# Patient Record
Sex: FEMALE | Race: BLACK OR AFRICAN AMERICAN | NOT HISPANIC OR LATINO | Employment: OTHER | URBAN - METROPOLITAN AREA
[De-identification: names, ages, dates, MRNs, and addresses within clinical notes are randomized per-mention and may not be internally consistent; named-entity substitution may affect disease eponyms.]

---

## 2017-02-20 RX ORDER — INSULIN GLARGINE 100 [IU]/ML
30 INJECTION, SOLUTION SUBCUTANEOUS DAILY
Qty: 15 ML | Refills: 11 | Status: SHIPPED | OUTPATIENT
Start: 2017-02-20 | End: 2017-02-27

## 2017-02-27 ENCOUNTER — TELEPHONE (OUTPATIENT)
Dept: FAMILY MEDICINE | Facility: CLINIC | Age: 77
End: 2017-02-27

## 2017-02-27 ENCOUNTER — DOCUMENTATION ONLY (OUTPATIENT)
Dept: FAMILY MEDICINE | Facility: CLINIC | Age: 77
End: 2017-02-27

## 2017-02-27 NOTE — TELEPHONE ENCOUNTER
Received fax from [employer]. Refill sent in last week for Lantus Solostar pen administer 30 units under skin every day. Insurance now wants pt to use Levemir. Please advise. ThanksCarmela

## 2017-02-27 NOTE — PROGRESS NOTES
Pre-Visit Chart Review  For Appointment Scheduled on 3/2/17.    Health Maintenance Due   Topic Date Due    Foot Exam  09/18/1950    TETANUS VACCINE  09/18/1957    Influenza Vaccine  08/01/2016    DEXA SCAN  12/31/2016    Hemoglobin A1c  02/24/2017

## 2017-03-01 ENCOUNTER — TELEPHONE (OUTPATIENT)
Dept: FAMILY MEDICINE | Facility: CLINIC | Age: 77
End: 2017-03-01

## 2017-03-01 NOTE — TELEPHONE ENCOUNTER
Patient's appointment with Dr Gil is not until tomorrow. Lab to be ordered at the time of her appointment. Called patient but received no answer. Voicemail was full.

## 2017-03-01 NOTE — TELEPHONE ENCOUNTER
----- Message from Marilyn Pavon sent at 3/1/2017  7:45 AM CST -----  Contact: self  Patient has an appointment today for her diabetes and has not received blood work orders. Patient has not eaten today yet if the orders can be seen to [facility]. Please call patient at [phone] when it is done. Thanks!

## 2017-03-02 ENCOUNTER — OFFICE VISIT (OUTPATIENT)
Dept: FAMILY MEDICINE | Facility: CLINIC | Age: 77
End: 2017-03-02
Payer: COMMERCIAL

## 2017-03-02 VITALS
SYSTOLIC BLOOD PRESSURE: 140 MMHG | WEIGHT: 202.63 LBS | OXYGEN SATURATION: 96 % | BODY MASS INDEX: 32.56 KG/M2 | DIASTOLIC BLOOD PRESSURE: 78 MMHG | HEIGHT: 66 IN | HEART RATE: 76 BPM | TEMPERATURE: 99 F

## 2017-03-02 DIAGNOSIS — Z13.820 SCREENING FOR OSTEOPOROSIS: ICD-10-CM

## 2017-03-02 DIAGNOSIS — E66.9 OBESITY, CLASS I, BMI 30-34.9: ICD-10-CM

## 2017-03-02 DIAGNOSIS — Z79.4 TYPE 2 DIABETES MELLITUS WITH COMPLICATION, WITH LONG-TERM CURRENT USE OF INSULIN: Primary | ICD-10-CM

## 2017-03-02 DIAGNOSIS — I15.2 HYPERTENSION ASSOCIATED WITH DIABETES: ICD-10-CM

## 2017-03-02 DIAGNOSIS — E11.59 HYPERTENSION ASSOCIATED WITH DIABETES: ICD-10-CM

## 2017-03-02 DIAGNOSIS — E11.8 TYPE 2 DIABETES MELLITUS WITH COMPLICATION, WITH LONG-TERM CURRENT USE OF INSULIN: Primary | ICD-10-CM

## 2017-03-02 PROCEDURE — 1160F RVW MEDS BY RX/DR IN RCRD: CPT | Mod: S$GLB,,, | Performed by: FAMILY MEDICINE

## 2017-03-02 PROCEDURE — 1126F AMNT PAIN NOTED NONE PRSNT: CPT | Mod: S$GLB,,, | Performed by: FAMILY MEDICINE

## 2017-03-02 PROCEDURE — 1157F ADVNC CARE PLAN IN RCRD: CPT | Mod: S$GLB,,, | Performed by: FAMILY MEDICINE

## 2017-03-02 PROCEDURE — 99999 PR PBB SHADOW E&M-EST. PATIENT-LVL III: CPT | Mod: PBBFAC,,, | Performed by: FAMILY MEDICINE

## 2017-03-02 PROCEDURE — 99214 OFFICE O/P EST MOD 30 MIN: CPT | Mod: S$GLB,,, | Performed by: FAMILY MEDICINE

## 2017-03-02 PROCEDURE — 1159F MED LIST DOCD IN RCRD: CPT | Mod: S$GLB,,, | Performed by: FAMILY MEDICINE

## 2017-03-02 NOTE — MR AVS SNAPSHOT
[facility]  [address]  [place] [address]  Phone: [phone]  Fax: [phone]                  Radha Alas   3/2/2017 4:00 PM   Office Visit    Description:  Female : 1940   Provider:  Cierra Gil MD   Department:  [facility]           Reason for Visit     Follow-up           Diagnoses this Visit        Comments    Type 2 diabetes mellitus with complication, with long-term current use of insulin    -  Primary     Hypertension associated with diabetes         Obesity, Class I, BMI 30-34.9         Screening for osteoporosis                To Do List           Future Appointments        Provider Department Dept Phone    3/16/2017 2:00 PM [facility] DEXA 1 [facility] [phone]    3/16/2017 3:00 PM Cierra Gil MD [facility] [phone]    2017 2:40 PM Cierra Gil MD [facility] [phone]      Goals (5 Years of Data)     None      Follow-Up and Disposition     Return in about 6 months (around 2017) for fasting labs.      [facility]     [facility] Care [facility] - / Assistance  Registered nurses in the [facility] provide clinical advisement, health education, appointment booking, and other advisory services.  Call for this free service at [phone].             Medications           Message regarding Medications     Verify the changes and/or additions to your medication regime listed below are the same as discussed with your clinician today.  If any of these changes or additions are incorrect, please notify your healthcare provider.        STOP taking these medications     lancets (ONETOUCH ULTRASOFT LANCETS) [facility] [place] checks blood sugar 2 times a day           Verify that the below list of medications is an accurate representation of the medications you are currently taking.  If none reported, the list may be blank. If incorrect, please contact your healthcare  "provider. Carry this list with you in case of emergency.           Current Medications     ACETAMINOPHEN (TYLENOL ARTHRITIS ORAL) Take by mouth.    CALCIUM CARBONATE/VITAMIN D3 (VITAMIN D-3 ORAL) Take by mouth once daily.    CARTIA  mg Cp24 Take one capsule every evening    DILT- mg CDCR Take 240 mg by mouth every morning.     furosemide (LASIX) 20 MG tablet Take 1 tablet (20 mg total) by mouth once daily.    glimepiride (AMARYL) 4 MG tablet TAKE 1 TABLET BY MOUTH TWICE DAILY    insulin detemir (LEVEMIR FLEXTOUCH) 100 unit/mL (3 mL) [facility] InPn pen Inject 30 Units into the skin every evening.    lisinopril (PRINIVIL,ZESTRIL) 20 MG tablet Take 1 tablet (20 mg total) by mouth once daily. 1 Tablet Oral Every day    ONETOUCH DELICA LANCETS 33 gauge Misc TEST SUGAR BID    ONETOUCH VERIO Strp TEST THREE TIMES DAILY    ONETOUCH VERIO Strp TEST THREE TIMES DAILY AS DIRECTED    pen needle, diabetic 31 gauge x 3/16" Ndle Use once daily to administer insulin    ranitidine (ZANTAC) 150 MG tablet TAKE 1 TABLET BY MOUTH DAILY AS NEEDED    RASAGILINE MESYLATE (AZILECT ORAL) Take by mouth daily as needed.     warfarin (COUMADIN) 3 MG tablet Take 3 mg by mouth Daily.            Clinical Reference Information           Your Vitals Were     BP                   140/78 (BP Location: Left arm, Patient Position: Sitting, BP Method: Manual)           Blood Pressure          Most Recent Value    BP  (!)  140/78      Allergies as of 3/2/2017     Glucophage  [Metformin]    Sulfa (Sulfonamide Antibiotics)      Immunizations Administered on Date of Encounter - 3/2/2017     Name Date Dose VIS Date Route    Pneumococcal Polysaccharide - 23 Valent  Deferred   -- -- --    TDAP  Deferred   -- -- --      Orders Placed During Today's Visit     Future Labs/Procedures Expected by Expires    Comprehensive metabolic panel  3/2/2017 3/2/2018    DXA Bone Density Spine And Hip_Axial Skeleton  3/2/2017 3/2/2018    Hemoglobin A1c  3/2/2017 5/1/2018 "    Lipid panel  3/2/2017 3/2/2018      Language Assistance Services     ATTENTION: Language assistance services are available, free of charge. Please call [phone].      ATENCIÓN: Si habla zaid, tiene a awan disposición servicios gratuitos de asistencia lingüística. Llame al [phone].     CHÚ Ý: N?u b?n nói Ti?ng Vi?t, có các d?ch v? h? tr? ngôn ng? mi?n phí dành cho b?n. G?i s? [phone].         [facility] complies with applicable Federal civil rights laws and does not discriminate on the basis of race, color, national origin, age, disability, or sex.

## 2017-03-05 NOTE — PROGRESS NOTES
CHIEF COMPLAINT:  Follow up      HISTORY OF PRESENT ILLNESS:  Radha Alas is a 76 y.o. female patient who presents to clinic for follow up.     1. Type 2 DM: patient is on amaryl, and levemir. She states that she has one episode of hypoglycemia with a FBG of 36. She is on an ACE-I. She is not on a statin. She is not up to date with her immunizations. She follows up with Dr. Tsai, ophthomology, she does not follow up with podiatry. She declines referral to podiatry.    2. HTN: patient is on dilt XR and cardia XT, lisinopril, lasix. She is on additional potassium supplementation. She denies any CP, SOB, edema, cough.  She follows up with Dr. Tamez, cardiology    3. Atrial fibrillation: patient is on dilt XR, cardia XT, coumadin. She is established with cardiology who monitors her INR.    4. She is due for screening for osteoporosis.    REVIEW OF SYSTEMS:  The patient denies any fever, chills, night sweats, headaches, vision changes, difficulty speaking or swallowing, decreased hearing, weight loss, weight gain, chest pain, palpitations, shortness of breath, cough, nausea, vomiting, abdominal pain, dysuria, diarrhea, constipation, hematuria, hematochezia, melena, changes in her hair, skin, nails, numbness or weakness in her extremities, erythema, pain or swelling over any of her joints, myalgias, swollen glands, easy bruising, fatigue, edema,  MEDICATIONS:   Reviewed and/or reconciled in EPIC    ALLERGIES:  Reviewed and/or reconciled in [facility]    PAST MEDICAL/SURGICAL HISTORY:   Past Medical History:   Diagnosis Date    Anemia     Atrial fibrillation     Chronic constipation     Deep vein thrombosis of left lower extremity     Diabetes mellitus     TYPE 2    Diverticulosis     Hypertension     Obesity     Parkinson disease     Peptic ulcer of stomach     Prolapse of female bladder, acquired     Rectocele     Uterine cancer 11-1-2011    STAGE 1-A GRADE 2      Past Surgical History:   Procedure Laterality  Date    COLONOSCOPY  prior to 2000    COLONOSCOPY N/A 9/28/2016    Procedure: COLONOSCOPY;  Surgeon: Baljinder Ospina MD;  Location: [facility];  Service: Endoscopy;  Laterality: N/A;    FLEXIBLE SIGMOIDOSCOPY  06/05/2016    at [facility]- in media section: poor prep, diverticulosis noted with old clots, internal hemorrhoids otherwise normal findings- recommend outpatient colonoscopy    FOREARM SURGERY      right forearm surgery    HERNIA REPAIR      ROBOTIC ASST    HYSTERECTOMY  11/1/2001    [facility]--cancer    PELVIC AND PARA-AORTIC LYMPH NODE DISSECTION      [place] REMOVAL OF OVARY/TUBE(S)      ROBOTIC ASST    TUBAL LIGATION      UPPER GASTROINTESTINAL ENDOSCOPY         FAMILY HISTORY:    Family History   Problem Relation Age of Onset    Heart disease Mother     Diabetes Mother     Hypertension Father     Heart disease Sister     Diabetes Maternal Grandmother     Hypertension Maternal Grandmother     Breast cancer Neg Hx     Ovarian cancer Neg Hx     Uterine cancer Neg Hx     Colon cancer Neg Hx     Cancer Neg Hx     Cervical cancer Neg Hx     Endometrial cancer Neg Hx     Vaginal cancer Neg Hx     Colon polyps Neg Hx     Crohn's disease Neg Hx     Ulcerative colitis Neg Hx        SOCIAL HISTORY:    Social History     Social History    Marital status:      Spouse name: N/A    Number of children: N/A    Years of education: N/A     Occupational History    Not on file.     Social History Main Topics    Smoking status: Never Smoker    Smokeless tobacco: Never Used    Alcohol use No    Drug use: No    Sexual activity: Not Currently     Other Topics Concern    Not on file     Social History Narrative       PHYSICAL EXAM:  VITAL SIGNS:   Vitals:    03/02/17 1637 03/02/17 1647   BP: (!) 144/80 (!) 140/78   BP Location: Right arm Left arm   Patient Position: Sitting Sitting   BP Method: Manual Manual   Pulse: 76    Temp: 98.7 °F (37.1 °C)    TempSrc: Oral    SpO2: 96%    Weight: 91.9 kg  "(202 lb 9.6 oz)    Height: 5' 6.25" (1.683 m)      GENERAL:  Patient appears well nourished, sitting on exam table, in no acute distress.  HEENT:  Atraumatic, normocephalic, PERRLA, EOMI, no conjunctival injection, sclerae are anicteric, normal external auditory canals,TMs clear b/l, gross hearing intact to whisper, MMM, no oropharygneal erythema or exudate.  NECK:  Supple, normal ROM, trachea is midline , no supraclavicular or cervical LAD or masses palpated.  Thyroid gland not palpable.  CARDIOVASCULAR:  RRR, normal S1 and S2, no m/r/g.  RESPIRATORY:  CTA b/l, no wheezes, rhonchi, rales.  No increased work of breathing, no  use of accessory muscles.  ABDOMEN:  Soft, nontender, nondistended, normoactive bowel sounds in all four quadrants, no rebound or guarding, no HSM or masses palpated.  Normal percussion.  EXTREMITIES:  2+ DP pulses b/l, no edema.  SKIN:  Warm, no lesions on exposed skin.  NEUROMUSCULAR:  Cranial nerves II-XII grossly intact.   No clubbing or cyanosis of digits/nails.  Slow gait.  PSYCH:  Patient is alert and oriented to person, time, place. They are appropriately dressed and groomed. There is normal eye contact. Rate and tone of speech is normal. Normal insight, judgement. Normal thought content and process.     LABORATORY/IMAGING STUDIES:pending    ASSESSMENT/PLAN: This is a 76 y.o. female who presents to clinic for evaluation of the following concerns  1. Type 2 DM: see below  2. HTN: see below  3. Obesity: see below  4. Screening osteoporosis: DEXA scan    Patient readiness: acceptance and barriers:none    During the course of the visit the patient was educated and counseled about the following:     Diabetes:  Hga1c, lipid panel. She will need a foot exam at her next visit. Can consider decreasing levemir and amaryl if at goal.   Hypertension:   Medication: no change.  Obesity:   General weight loss/lifestyle modification strategies discussed (elicit support from others; identify saboteurs; " non-food rewards, etc).  Diet interventions: moderate (500 kCal/d) deficit diet.  Informal exercise measures discussed, e.g. taking stairs instead of elevator.  Regular aerobic exercise program discussed.    Goals: Diabetes: Maintain Hemoglobin A1C below 7, Hypertension: Reduce Blood Pressure and Obesity: Reduce calorie intake and BMI    Did patient meet goals/outcomes: No     The following self management tools provided: declined    Patient Instructions (the written plan) was given to the patient/family.     Time spent with patient: 30 minutes    Cierra Gil MD

## 2017-03-15 ENCOUNTER — TELEPHONE (OUTPATIENT)
Dept: FAMILY MEDICINE | Facility: CLINIC | Age: 77
End: 2017-03-15

## 2017-03-15 NOTE — TELEPHONE ENCOUNTER
----- Message from Gogo Valdovinos sent at 3/15/2017  2:40 PM CDT -----  Contact: self  Patient [phone] is asking if she is suppose to go to [facility] and if so what is the reason she is going there?/she does not remember why

## 2017-03-15 NOTE — TELEPHONE ENCOUNTER
Notified patient labs were sent to [facility],notified patient she will need to be fasting states understanding

## 2017-03-16 ENCOUNTER — TELEPHONE (OUTPATIENT)
Dept: FAMILY MEDICINE | Facility: CLINIC | Age: 77
End: 2017-03-16

## 2017-03-16 NOTE — TELEPHONE ENCOUNTER
"Called patient to reschedule nurse visit from today 3/16/17 patient states she does not want to rescheduled,patient states "why is a blood pressure check nescessary" notified patient that the reason for bp check is because at last visit bp was high and Dr Gil wanted her to come for bp check in 2 weeks, patient states I dont want to come in to have my blood pressure checked my blood pressure  was not that high notified, patient her bp was high, patient cont to decline rescheduling appointment.  "

## 2017-03-16 NOTE — TELEPHONE ENCOUNTER
----- Message from Nakia Smith RT sent at 3/16/2017  9:33 AM CDT -----  Contact: pt    pt , Good Morning, I made a mistake and rescheduled this pt's appt, she called to reschedule and i should have sent a messaged instead i rescheduled, please fix it for her B/P check, thanks.

## 2017-03-17 LAB
ALBUMIN SERPL-MCNC: 4.2 G/DL (ref 3.5–4.8)
ALBUMIN/GLOB SERPL: 1.4 {RATIO} (ref 1.2–2.2)
ALP SERPL-CCNC: 88 IU/L (ref 39–117)
ALT SERPL-CCNC: 14 IU/L (ref 0–32)
AST SERPL-CCNC: 15 IU/L (ref 0–40)
BILIRUB SERPL-MCNC: 0.4 MG/DL (ref 0–1.2)
BUN SERPL-MCNC: 11 MG/DL (ref 8–27)
BUN/CREAT SERPL: 15 (ref 11–26)
CALCIUM SERPL-MCNC: 9.3 MG/DL (ref 8.7–10.3)
CHLORIDE SERPL-SCNC: 101 MMOL/L (ref 96–106)
CHOLEST SERPL-MCNC: 183 MG/DL (ref 100–199)
CO2 SERPL-SCNC: 29 MMOL/L (ref 18–29)
CREAT SERPL-MCNC: 0.71 MG/DL (ref 0.57–1)
GLOBULIN SER CALC-MCNC: 3.1 G/DL (ref 1.5–4.5)
GLUCOSE SERPL-MCNC: 70 MG/DL (ref 65–99)
HBA1C MFR BLD: 6.6 % (ref 4.8–5.6)
HDLC SERPL-MCNC: 68 MG/DL
LDLC SERPL CALC-MCNC: 87 MG/DL (ref 0–99)
POTASSIUM SERPL-SCNC: 4.2 MMOL/L (ref 3.5–5.2)
PROT SERPL-MCNC: 7.3 G/DL (ref 6–8.5)
SODIUM SERPL-SCNC: 144 MMOL/L (ref 134–144)
TRIGL SERPL-MCNC: 142 MG/DL (ref 0–149)
VLDLC SERPL CALC-MCNC: 28 MG/DL (ref 5–40)

## 2017-03-28 ENCOUNTER — TELEPHONE (OUTPATIENT)
Dept: FAMILY MEDICINE | Facility: CLINIC | Age: 77
End: 2017-03-28

## 2017-03-28 DIAGNOSIS — E11.8 TYPE 2 DIABETES MELLITUS WITH COMPLICATION, WITHOUT LONG-TERM CURRENT USE OF INSULIN: Primary | ICD-10-CM

## 2017-03-28 RX ORDER — GLIMEPIRIDE 4 MG/1
4 TABLET ORAL
Qty: 30 TABLET | Refills: 11 | Status: SHIPPED | OUTPATIENT
Start: 2017-03-28 | End: 2018-02-01 | Stop reason: SDUPTHER

## 2017-03-28 NOTE — TELEPHONE ENCOUNTER
Diabetes is at goal, needs to continue with current dose of levemir, but I am decreasing her amaryl from 4 mg twice a day to 4 mg once a day with breakfast so that she does not have any more episodes of hypoglycemia.       Her cholesterol is high and cardiac risk is high enough to be on a statin. Needs to eat low fat diet and let me know if she wants to start one

## 2017-03-29 RX ORDER — ATORVASTATIN CALCIUM 20 MG/1
20 TABLET, FILM COATED ORAL DAILY
Qty: 90 TABLET | Refills: 1 | Status: SHIPPED | OUTPATIENT
Start: 2017-03-29 | End: 2017-09-05

## 2017-03-29 NOTE — TELEPHONE ENCOUNTER
Lipitor sent to pharmacy. Needs to have cholesterol checked again in 3-6 months. She has an appointment with Dr. Gil in six months so this is appropriate scheduling.

## 2017-03-29 NOTE — TELEPHONE ENCOUNTER
----- Message from Libby Serrano sent at 3/29/2017 11:44 AM CDT -----  Contact: self  Patient is returning a nurse call regarding her medication   Please call her at  to advise.     Thanks

## 2017-03-29 NOTE — TELEPHONE ENCOUNTER
Returned patient call. She answered. Said hello. i acknowledged who i was and where i was calling from. The line was then disconnected

## 2017-03-29 NOTE — TELEPHONE ENCOUNTER
----- Message from RT Omid sent at 3/29/2017  4:15 PM CDT -----  Contact: pt    pt , requesting a call back soon she forgot her medication directions, thanks.

## 2017-03-31 ENCOUNTER — TELEPHONE (OUTPATIENT)
Dept: FAMILY MEDICINE | Facility: CLINIC | Age: 77
End: 2017-03-31

## 2017-03-31 NOTE — TELEPHONE ENCOUNTER
----- Message from Fanny Gómez sent at 3/30/2017  3:28 PM CDT -----  Patient needs to speak with nurse for instructions on taking medication/please call back at [phone] to advise.

## 2017-04-11 ENCOUNTER — TELEPHONE (OUTPATIENT)
Dept: FAMILY MEDICINE | Facility: CLINIC | Age: 77
End: 2017-04-11

## 2017-04-11 NOTE — TELEPHONE ENCOUNTER
----- Message from Mary Dixon sent at 4/10/2017  1:37 PM CDT -----  Contact: self  Patient called not sure why she was prescribed atorvastatin (LIPITOR) 20 MG tablet. Please contact her at [phone].    Thanks!

## 2017-05-09 ENCOUNTER — TELEPHONE (OUTPATIENT)
Dept: FAMILY MEDICINE | Facility: CLINIC | Age: 77
End: 2017-05-09

## 2017-05-09 NOTE — TELEPHONE ENCOUNTER
----- Message from Angelika Lay sent at 5/9/2017  3:35 PM CDT -----  Pt is requesting a standing order to check her glucose sent to [facility] ... Call pt at [phone]  To advise

## 2017-05-09 NOTE — TELEPHONE ENCOUNTER
I don not understand what this message means.  We do not usually place standing orders for blood sugar.  Her diabetes is well controlled. Will need repeat Hga1c in September.  She woo a glucometer to check her blood sugars at home

## 2017-06-13 RX ORDER — FUROSEMIDE 20 MG/1
TABLET ORAL
Qty: 30 TABLET | Refills: 11 | Status: SHIPPED | OUTPATIENT
Start: 2017-06-13 | End: 2019-10-01 | Stop reason: ALTCHOICE

## 2017-08-18 ENCOUNTER — DOCUMENTATION ONLY (OUTPATIENT)
Dept: FAMILY MEDICINE | Facility: CLINIC | Age: 77
End: 2017-08-18

## 2017-08-18 NOTE — PROGRESS NOTES
Pre-Visit Chart Review  For Appointment Scheduled on 9/5/17.    Health Maintenance Due   Topic Date Due    Foot Exam  09/18/1950    TETANUS VACCINE  01/09/2008    DEXA SCAN  12/31/2016    Influenza Vaccine  08/01/2017    Eye Exam  08/29/2017

## 2017-09-01 ENCOUNTER — TELEPHONE (OUTPATIENT)
Dept: FAMILY MEDICINE | Facility: CLINIC | Age: 77
End: 2017-09-01

## 2017-09-01 NOTE — TELEPHONE ENCOUNTER
----- Message from Mylene Washington sent at 9/1/2017 10:42 AM CDT -----  Contact: Pt  Pt is requesting to speak to the nurse regarding lab orders that she needs sent to [facility] ([address] [place] [address]). Pt wants to have the labs done tomorrow. Pls call pt back at [phone].

## 2017-09-04 LAB — HBA1C MFR BLD: 6.4 % (ref 4.8–5.6)

## 2017-09-05 ENCOUNTER — OFFICE VISIT (OUTPATIENT)
Dept: FAMILY MEDICINE | Facility: CLINIC | Age: 77
End: 2017-09-05
Payer: COMMERCIAL

## 2017-09-05 VITALS
TEMPERATURE: 99 F | HEIGHT: 66 IN | SYSTOLIC BLOOD PRESSURE: 138 MMHG | BODY MASS INDEX: 33.91 KG/M2 | WEIGHT: 211 LBS | DIASTOLIC BLOOD PRESSURE: 56 MMHG | HEART RATE: 56 BPM

## 2017-09-05 DIAGNOSIS — E11.69 HYPERLIPIDEMIA ASSOCIATED WITH TYPE 2 DIABETES MELLITUS: ICD-10-CM

## 2017-09-05 DIAGNOSIS — I48.20 CHRONIC ATRIAL FIBRILLATION: ICD-10-CM

## 2017-09-05 DIAGNOSIS — G20.A1 PARKINSON DISEASE: ICD-10-CM

## 2017-09-05 DIAGNOSIS — E11.59 HYPERTENSION ASSOCIATED WITH DIABETES: ICD-10-CM

## 2017-09-05 DIAGNOSIS — E11.8 TYPE 2 DIABETES MELLITUS WITH COMPLICATION, WITHOUT LONG-TERM CURRENT USE OF INSULIN: Primary | ICD-10-CM

## 2017-09-05 DIAGNOSIS — I15.2 HYPERTENSION ASSOCIATED WITH DIABETES: ICD-10-CM

## 2017-09-05 DIAGNOSIS — E78.5 HYPERLIPIDEMIA ASSOCIATED WITH TYPE 2 DIABETES MELLITUS: ICD-10-CM

## 2017-09-05 DIAGNOSIS — Z13.820 SCREENING FOR OSTEOPOROSIS: ICD-10-CM

## 2017-09-05 DIAGNOSIS — E66.9 OBESITY, CLASS I, BMI 30-34.9: ICD-10-CM

## 2017-09-05 PROCEDURE — 3075F SYST BP GE 130 - 139MM HG: CPT | Mod: S$GLB,,, | Performed by: FAMILY MEDICINE

## 2017-09-05 PROCEDURE — 1159F MED LIST DOCD IN RCRD: CPT | Mod: S$GLB,,, | Performed by: FAMILY MEDICINE

## 2017-09-05 PROCEDURE — 99999 PR PBB SHADOW E&M-EST. PATIENT-LVL III: CPT | Mod: PBBFAC,,, | Performed by: FAMILY MEDICINE

## 2017-09-05 PROCEDURE — 1126F AMNT PAIN NOTED NONE PRSNT: CPT | Mod: S$GLB,,, | Performed by: FAMILY MEDICINE

## 2017-09-05 PROCEDURE — 3078F DIAST BP <80 MM HG: CPT | Mod: S$GLB,,, | Performed by: FAMILY MEDICINE

## 2017-09-05 PROCEDURE — 99214 OFFICE O/P EST MOD 30 MIN: CPT | Mod: S$GLB,,, | Performed by: FAMILY MEDICINE

## 2017-09-05 PROCEDURE — 3008F BODY MASS INDEX DOCD: CPT | Mod: S$GLB,,, | Performed by: FAMILY MEDICINE

## 2017-09-05 NOTE — PROGRESS NOTES
CHIEF COMPLAINT:  Follow up      HISTORY OF PRESENT ILLNESS:  Radha Alas is a 76 y.o. female patient who presents to clinic for follow up.     1. Type 2 DM: Patient is on amaryl, and levemir. She states that she will take 10-20 units of levemir depending on her blood sugar. She denies any hypoglycemia. Recent HgA1c was 6.4%.   She is on an ACE-I. She is not on a statin as she did not want to start the lipitor which was prescribed.  She is not up to date with her immunizations. She follows up with Dr. Tsai, ophthomology, she does not follow up with podiatry. She declines referral to podiatry.    2. HTN: Patient is on dilt XR and cardia XT, lisinopril, lasix. She is on additional potassium supplementation. She denies any CP, SOB, edema, cough.  She follows up with Dr. Tamez, cardiology    3. Atrial fibrillation: Patient is on dilt XR, cardia XT, coumadin. She is established with cardiology who monitors her INR.    4. She is due for screening for osteoporosis.    5. She states that she saw a neurologist in [place] for her possible Parkinson's disease. She did not want to undergo the further testing which was ordered.  She has been taking azilect as needed for severe tremor.      REVIEW OF SYSTEMS:  The patient denies any fever, chills, night sweats, headaches, vision changes, difficulty speaking or swallowing, decreased hearing, weight loss, weight gain, chest pain, palpitations, shortness of breath, cough, nausea, vomiting, abdominal pain, dysuria, diarrhea, constipation, hematuria, hematochezia, melena, changes in her hair, skin, nails, numbness or weakness in her extremities, erythema, pain or swelling over any of her joints, myalgias, swollen glands, easy bruising, fatigue, edema,  MEDICATIONS:   Reviewed and/or reconciled in EPIC    ALLERGIES:  Reviewed and/or reconciled in [facility]    PAST MEDICAL/SURGICAL HISTORY:   Past Medical History:   Diagnosis Date    Anemia     Atrial fibrillation     Chronic  constipation     Deep vein thrombosis of left lower extremity     Diabetes mellitus     TYPE 2    Diverticulosis     Hypertension     Obesity     Parkinson disease     Peptic ulcer of stomach     Prolapse of female bladder, acquired     Rectocele     Uterine cancer 11-1-2011    STAGE 1-A GRADE 2      Past Surgical History:   Procedure Laterality Date    COLONOSCOPY  prior to 2000    COLONOSCOPY N/A 9/28/2016    Procedure: COLONOSCOPY;  Surgeon: Baljinder Ospina MD;  Location: [facility];  Service: Endoscopy;  Laterality: N/A;    FLEXIBLE SIGMOIDOSCOPY  06/05/2016    at [facility]- in media section: poor prep, diverticulosis noted with old clots, internal hemorrhoids otherwise normal findings- recommend outpatient colonoscopy    FOREARM SURGERY      right forearm surgery    HERNIA REPAIR      ROBOTIC ASST    HYSTERECTOMY  11/1/2001    [facility]/BS[facility]--cancer    PELVIC AND PARA-AORTIC LYMPH NODE DISSECTION      [place] REMOVAL OF OVARY/TUBE(S)      ROBOTIC ASST    TUBAL LIGATION      UPPER GASTROINTESTINAL ENDOSCOPY         FAMILY HISTORY:    Family History   Problem Relation Age of Onset    Heart disease Mother     Diabetes Mother     Hypertension Father     Heart disease Sister     Diabetes Maternal Grandmother     Hypertension Maternal Grandmother     Breast cancer Neg Hx     Ovarian cancer Neg Hx     Uterine cancer Neg Hx     Colon cancer Neg Hx     Cancer Neg Hx     Cervical cancer Neg Hx     Endometrial cancer Neg Hx     Vaginal cancer Neg Hx     Colon polyps Neg Hx     Crohn's disease Neg Hx     Ulcerative colitis Neg Hx        SOCIAL HISTORY:    Social History     Social History    Marital status:      Spouse name: N/A    Number of children: N/A    Years of education: N/A     Occupational History    Not on file.     Social History Main Topics    Smoking status: Never Smoker    Smokeless tobacco: Never Used    Alcohol use No    Drug use: No    Sexual activity: Not Currently  "    Other Topics Concern    Not on file     Social History Narrative    No narrative on file       PHYSICAL EXAM:  VITAL SIGNS:   Vitals:    09/05/17 1444   BP: (!) 138/56   BP Location: Right arm   Patient Position: Sitting   BP Method: Large (Manual)   Pulse: (!) 56   Temp: 98.9 °F (37.2 °C)   TempSrc: Oral   Weight: 95.7 kg (210 lb 15.7 oz)   Height: 5' 6.25" (1.683 m)     GENERAL:  Patient appears well nourished, sitting on exam room chair, in no acute distress.  HEENT:  Atraumatic, normocephalic, PERRLA, EOMI, no conjunctival injection, sclerae are anicteric, normal external auditory canals,TMs clear b/l, gross hearing intact to whisper, MMM, no oropharygneal erythema or exudate.  NECK:  Supple, normal ROM, trachea is midline , no supraclavicular or cervical LAD or masses palpated.  Thyroid gland not palpable.  CARDIOVASCULAR:  RRR, normal S1 and S2, no m/r/g.  RESPIRATORY:  CTA b/l, no wheezes, rhonchi, rales.  No increased work of breathing, no  use of accessory muscles.  ABDOMEN:  Soft, nontender, nondistended, normoactive bowel sounds in all four quadrants, no rebound or guarding, no HSM or masses palpated.  Normal percussion.  EXTREMITIES:  2+ DP pulses b/l, no edema.  SKIN:  Warm, no lesions on exposed skin.  NEUROMUSCULAR:  Cranial nerves II-XII grossly intact.   No clubbing or cyanosis of digits/nails.  Slow gait.  PSYCH:  Patient is alert and oriented to person, time, place. They are appropriately dressed and groomed. There is normal eye contact. Rate and tone of speech is normal. Normal insight, judgement. Normal thought content and process.     LABORATORY/IMAGING STUDIES:pending    ASSESSMENT/PLAN: This is a 76 y.o. female who presents to clinic for evaluation of the following concerns  1. Type 2 DM: see below  2. HTN, A-fib: see below  3. Obesity: see below  4. Screening osteoporosis: DEXA scan  5. Parkinson's disease: obtain medical records from neurology  6. Hyperlipidemia: CMP, lipid panel. " Patient prefers not to be on a statin.     Patient readiness: acceptance and barriers:none    During the course of the visit the patient was educated and counseled about the following:     Diabetes:  Notify clinic if she has any episodes of hypoglycemia as her levemir and amaryl may need to be decreased. Will obtain CMP, lipid panel, urine microalbumin/cr.  She will need a foot exam at her next visit.   Hypertension:   Medication: no change.  Obesity:   General weight loss/lifestyle modification strategies discussed (elicit support from others; identify saboteurs; non-food rewards, etc).  Diet interventions: moderate (500 kCal/d) deficit diet.  Informal exercise measures discussed, e.g. taking stairs instead of elevator.  Regular aerobic exercise program discussed.    Goals: Diabetes: Maintain Hemoglobin A1C below 7, Hypertension: Reduce Blood Pressure and Obesity: Reduce calorie intake and BMI    Did patient meet goals/outcomes: No     The following self management tools provided: declined    Patient Instructions (the written plan) was given to the patient/family.     Time spent with patient: 30 minutes    Cierra Gil MD

## 2017-09-11 ENCOUNTER — TELEPHONE (OUTPATIENT)
Dept: FAMILY MEDICINE | Facility: CLINIC | Age: 77
End: 2017-09-11

## 2017-09-11 NOTE — TELEPHONE ENCOUNTER
Received message from referral center. Please advise.      Financial Call Center has not been able to contact patient.   Pt has a liability and/or residual balance due.               Is this procedure medically urgent or non-medically   Please respond via In-basket or contact [facility] @ [phone]                   Medical Urgency Definition      If you can Answer yes to one of the following questions, the    Case will be considered Medically Urgent and will be done as   Scheduled irrespective of whether [facility] will receive payment.      *If this particular case is not done as scheduled, would the patient   Experience loss of life?      *Loss of Limb?      *Irreversible loss of function?      *Would their condition significantly worsen?      If none of these questions have a yes answer, the case   Should be postponed until such a time as the finances   can be sorted out.

## 2017-09-12 ENCOUNTER — HOSPITAL ENCOUNTER (OUTPATIENT)
Dept: RADIOLOGY | Facility: CLINIC | Age: 77
Discharge: HOME | End: 2017-09-12
Attending: FAMILY MEDICINE
Payer: COMMERCIAL

## 2017-09-12 DIAGNOSIS — Z13.820 SCREENING FOR OSTEOPOROSIS: ICD-10-CM

## 2017-09-12 PROCEDURE — 77080 DXA BONE DENSITY AXIAL: CPT | Mod: 26,,, | Performed by: RADIOLOGY

## 2017-09-12 PROCEDURE — 77080 DXA BONE DENSITY AXIAL: CPT | Mod: TC,PO

## 2017-09-13 LAB
ALBUMIN/CREAT UR: 4.2 MG/G CREAT (ref 0–30)
CREAT UR-MCNC: 95.5 MG/DL
MICROALBUMIN UR-MCNC: 4 UG/ML

## 2017-09-18 ENCOUNTER — TELEPHONE (OUTPATIENT)
Dept: FAMILY MEDICINE | Facility: CLINIC | Age: 77
End: 2017-09-18

## 2017-09-18 NOTE — TELEPHONE ENCOUNTER
----- Message from Esthela Peterson sent at 9/16/2017 10:38 AM CDT -----  Contact: patient  Patient called requesting test results.please call back to discuss at [phone]. Thanks,

## 2017-09-19 ENCOUNTER — TELEPHONE (OUTPATIENT)
Dept: FAMILY MEDICINE | Facility: CLINIC | Age: 77
End: 2017-09-19

## 2017-09-19 DIAGNOSIS — Z91.81 AT RISK FOR FALLS: ICD-10-CM

## 2017-09-19 DIAGNOSIS — G20.A1 PARKINSON DISEASE: Primary | ICD-10-CM

## 2017-09-19 NOTE — TELEPHONE ENCOUNTER
Patient requesting shower chair and walker with a seat.Patient states she when she sits in the tub she have a hard time getting up and when she takes a shower she can not stand long   should I put parkinson as diagnosis ?

## 2017-09-19 NOTE — TELEPHONE ENCOUNTER
----- Message from Esthela Peterson sent at 9/19/2017 12:17 PM CDT -----  Contact: patient  Patient called requesting order for walker with the seat,and shower chair.please call back to advise at [phone]. Thanks,

## 2017-09-20 ENCOUNTER — TELEPHONE (OUTPATIENT)
Dept: FAMILY MEDICINE | Facility: CLINIC | Age: 77
End: 2017-09-20

## 2017-09-20 NOTE — TELEPHONE ENCOUNTER
----- Message from Kalpana Bonner sent at 9/19/2017  4:50 PM CDT -----  Contact: Olena with [employer] at [phone]  lOena with [employer] at [phone], Calling about orders faxed over today on this patient. They can accept the order ad will be faxing over a CMN that will need to be signed prior to delivery. Please advise.Thanks.

## 2017-10-04 ENCOUNTER — TELEPHONE (OUTPATIENT)
Dept: FAMILY MEDICINE | Facility: CLINIC | Age: 77
End: 2017-10-04

## 2017-10-04 NOTE — TELEPHONE ENCOUNTER
----- Message from Bo Rodriguez sent at 10/4/2017  9:45 AM CDT -----  Contact: Self  [phone]  Patient is calling for bone density test results. Thanks!

## 2017-11-03 RX ORDER — LISINOPRIL 20 MG/1
TABLET ORAL
Qty: 30 TABLET | Refills: 11 | Status: SHIPPED | OUTPATIENT
Start: 2017-11-03 | End: 2018-11-06 | Stop reason: SDUPTHER

## 2017-11-07 ENCOUNTER — TELEPHONE (OUTPATIENT)
Dept: FAMILY MEDICINE | Facility: CLINIC | Age: 77
End: 2017-11-07

## 2017-11-07 NOTE — TELEPHONE ENCOUNTER
----- Message from Erika Medeiros sent at 11/6/2017  3:55 PM CST -----  Contact: self   Patient need a refill on AZILECT ORAL please send to [facility], any questions please call back at [phone] (home)     [facility] [address] [place] [address] & [address] [address]  Phone: [phone] Fax: [phone]

## 2017-11-29 ENCOUNTER — TELEPHONE (OUTPATIENT)
Dept: FAMILY MEDICINE | Facility: CLINIC | Age: 77
End: 2017-11-29

## 2017-11-29 NOTE — TELEPHONE ENCOUNTER
----- Message from Symone Vigil sent at 11/28/2017  3:00 PM CST -----  Please call patient in regards to her requesting a Rx for tremors, patient states she had stopped taking a previous medication & wants to start retaking, [phone] (home)     [facility] [address] [place] [address] & [address] [address]  Phone: [phone] Fax: [phone]

## 2017-12-10 RX ORDER — GLIMEPIRIDE 4 MG/1
TABLET ORAL
Qty: 60 TABLET | Refills: 11 | Status: SHIPPED | OUTPATIENT
Start: 2017-12-10 | End: 2018-03-10

## 2017-12-11 ENCOUNTER — TELEPHONE (OUTPATIENT)
Dept: FAMILY MEDICINE | Facility: CLINIC | Age: 77
End: 2017-12-11

## 2017-12-11 NOTE — TELEPHONE ENCOUNTER
----- Message from Ney Corrales sent at 12/11/2017  9:28 AM CST -----  Contact: patient  Patient states that her meter had broken and she cannot check her glucose levels.  She would like an order put in for a new meter.  It's for the ONETOUCH DELICA LANCETS 33 gauge Misc.  Can you please call the pharmacy so she can get one right away or do you have an extra one in the clinic she can come by a .  Can you please call the patient back at [phone].  Thank you      [facility] [address] [place] [address] & [address] [address]  Phone: [phone] Fax: [phone]

## 2017-12-18 RX ORDER — BLOOD-GLUCOSE METER
EACH MISCELLANEOUS
Qty: 100 STRIP | Refills: 0 | Status: SHIPPED | OUTPATIENT
Start: 2017-12-18 | End: 2018-02-14 | Stop reason: SDUPTHER

## 2017-12-29 RX ORDER — PEN NEEDLE, DIABETIC 31 GX5/16"
NEEDLE, DISPOSABLE MISCELLANEOUS
Qty: 100 EACH | Refills: 11 | Status: SHIPPED | OUTPATIENT
Start: 2017-12-29 | End: 2019-11-18 | Stop reason: SDUPTHER

## 2018-02-01 RX ORDER — GLIMEPIRIDE 4 MG/1
4 TABLET ORAL
Qty: 30 TABLET | Refills: 11 | Status: SHIPPED | OUTPATIENT
Start: 2018-02-01 | End: 2019-02-17 | Stop reason: SDUPTHER

## 2018-02-01 NOTE — TELEPHONE ENCOUNTER
----- Message from Kalpana Bonner sent at 2/1/2018  2:26 PM CST -----  Contact: Patient  Radha, patient [phone], calling to get her Rx glimepiride (AMARYL) 4 MG tablet, once daily Rx for 30 days. [facility] needs an update Rx. Please advise. Thanks.   [facility] [id number] [address] [place] [address]  Phone: [phone] Fax: [phone]

## 2018-02-14 RX ORDER — BLOOD-GLUCOSE METER
EACH MISCELLANEOUS
Qty: 100 STRIP | Refills: 0 | Status: SHIPPED | OUTPATIENT
Start: 2018-02-14 | End: 2018-03-03 | Stop reason: SDUPTHER

## 2018-03-02 DIAGNOSIS — E11.8 TYPE 2 DIABETES MELLITUS WITH COMPLICATION, WITH LONG-TERM CURRENT USE OF INSULIN: Primary | ICD-10-CM

## 2018-03-02 DIAGNOSIS — Z79.4 TYPE 2 DIABETES MELLITUS WITH COMPLICATION, WITH LONG-TERM CURRENT USE OF INSULIN: Primary | ICD-10-CM

## 2018-03-05 ENCOUNTER — DOCUMENTATION ONLY (OUTPATIENT)
Dept: FAMILY MEDICINE | Facility: CLINIC | Age: 78
End: 2018-03-05

## 2018-03-05 ENCOUNTER — TELEPHONE (OUTPATIENT)
Dept: FAMILY MEDICINE | Facility: CLINIC | Age: 78
End: 2018-03-05

## 2018-03-05 RX ORDER — BLOOD-GLUCOSE METER
EACH MISCELLANEOUS
Qty: 100 STRIP | Refills: 0 | Status: SHIPPED | OUTPATIENT
Start: 2018-03-05 | End: 2018-04-16 | Stop reason: SDUPTHER

## 2018-03-05 NOTE — TELEPHONE ENCOUNTER
Called pt---she was scheduled to see Dr. Gil tomorrow at 1 pm and called to reschedule. The phone  rescheduled pt incorrectly-pt was scheduled for a 40 minute appt, and was rescheduled to a 20 min. Rescheduled to this Saturday at 11 am. Thanks, Carmela

## 2018-03-05 NOTE — TELEPHONE ENCOUNTER
----- Message from Erika Medeiros sent at 3/5/2018 11:02 AM CST -----  Contact: self   Patient need a refill on test scripts ONETOUCH [address] Strp  please send to [facility], any questions please call back at [phone] (home)     [facility] [address] [place] [address] & [address] [address]  Phone: [phone] Fax: [phone]

## 2018-03-05 NOTE — PROGRESS NOTES
Pre-Visit Chart Review  For Appointment Scheduled on 3/6/18.    Health Maintenance Due   Topic Date Due    Foot Exam  09/18/1950    TETANUS VACCINE  01/09/2008    Influenza Vaccine  08/01/2017    Hemoglobin A1c  03/02/2018

## 2018-03-09 ENCOUNTER — DOCUMENTATION ONLY (OUTPATIENT)
Dept: FAMILY MEDICINE | Facility: CLINIC | Age: 78
End: 2018-03-09

## 2018-03-09 NOTE — PROGRESS NOTES
Pre-Visit Chart Review  For Appointment Scheduled on 03/10/2018    Health Maintenance Due   Topic Date Due    Foot Exam  09/18/1950    TETANUS VACCINE  01/09/2008    Influenza Vaccine  08/01/2017    Hemoglobin A1c  03/02/2018

## 2018-03-10 ENCOUNTER — OFFICE VISIT (OUTPATIENT)
Dept: FAMILY MEDICINE | Facility: CLINIC | Age: 78
End: 2018-03-10
Payer: COMMERCIAL

## 2018-03-10 VITALS
HEIGHT: 66 IN | HEART RATE: 65 BPM | DIASTOLIC BLOOD PRESSURE: 79 MMHG | BODY MASS INDEX: 30.65 KG/M2 | WEIGHT: 190.69 LBS | TEMPERATURE: 98 F | SYSTOLIC BLOOD PRESSURE: 137 MMHG

## 2018-03-10 DIAGNOSIS — E11.59 HYPERTENSION ASSOCIATED WITH DIABETES: ICD-10-CM

## 2018-03-10 DIAGNOSIS — E66.9 OBESITY, CLASS I, BMI 30-34.9: ICD-10-CM

## 2018-03-10 DIAGNOSIS — I15.2 HYPERTENSION ASSOCIATED WITH DIABETES: ICD-10-CM

## 2018-03-10 DIAGNOSIS — E11.8 TYPE 2 DIABETES MELLITUS WITH COMPLICATION, WITHOUT LONG-TERM CURRENT USE OF INSULIN: Primary | ICD-10-CM

## 2018-03-10 PROCEDURE — 99214 OFFICE O/P EST MOD 30 MIN: CPT | Mod: S$GLB,,, | Performed by: FAMILY MEDICINE

## 2018-03-10 PROCEDURE — 99999 PR PBB SHADOW E&M-EST. PATIENT-LVL III: CPT | Mod: PBBFAC,,, | Performed by: FAMILY MEDICINE

## 2018-03-10 RX ORDER — CARBIDOPA AND LEVODOPA 25; 100 MG/1; MG/1
1 TABLET ORAL 2 TIMES DAILY
COMMUNITY
Start: 2018-03-09 | End: 2019-10-01 | Stop reason: SDDI

## 2018-03-10 NOTE — PROGRESS NOTES
CHIEF COMPLAINT:  Follow up      HISTORY OF PRESENT ILLNESS:  Radha Alas is a 77 y.o. female patient who presents to clinic for follow up.     1. Type 2 DM: Patient is on amaryl, and levemir. She states that she will take 10-20 units of levemir depending on her blood sugar. She denies any hypoglycemia. A last  HgA1c was 6.4%.   She is on an ACE-I. She is not on a statin as she did not want to start the lipitor which was prescribed.  She is not up to date with her immunizations. She follows up with Dr. Tsai, ophthomology, she does not follow up with podiatry. She declines referral to podiatry. A urine microalbumin/cr was negative.     2. HTN, A-fib: Patient is on dilt XR and cardia XT, lisinopril, lasix. She is on additional potassium supplementation. She denies any CP, SOB, edema, cough.  She follows up with Dr. Tamez, cardiology    3. She states that Dr. Tamez recently did labs on her at [facility]     REVIEW OF SYSTEMS:  The patient denies any fever, chills, night sweats, headaches, vision changes, difficulty speaking or swallowing, decreased hearing, weight loss, weight gain, chest pain, palpitations, shortness of breath, cough, nausea, vomiting, abdominal pain, dysuria, diarrhea, constipation, hematuria, hematochezia, melena, changes in her hair, skin, nails, numbness or weakness in her extremities, erythema, pain or swelling over any of her joints, myalgias, swollen glands, easy bruising, fatigue, edema,  MEDICATIONS:   Reviewed and/or reconciled in EPIC    ALLERGIES:  Reviewed and/or reconciled in [facility]    PAST MEDICAL/SURGICAL HISTORY:   Past Medical History:   Diagnosis Date    Anemia     Atrial fibrillation     Chronic constipation     Deep vein thrombosis of left lower extremity     Diabetes mellitus     TYPE 2    Diverticulosis     Hypertension     Obesity     Parkinson disease     Peptic ulcer of stomach     Prolapse of female bladder, acquired     Rectocele     Uterine cancer 11-1-2011     STAGE 1-A GRADE 2      Past Surgical History:   Procedure Laterality Date    COLONOSCOPY  prior to 2000    COLONOSCOPY N/A 9/28/2016    Procedure: COLONOSCOPY;  Surgeon: Baljinder Ospina MD;  Location: [facility];  Service: Endoscopy;  Laterality: N/A;    FLEXIBLE SIGMOIDOSCOPY  06/05/2016    at [facility]- in media section: poor prep, diverticulosis noted with old clots, internal hemorrhoids otherwise normal findings- recommend outpatient colonoscopy    FOREARM SURGERY      right forearm surgery    HERNIA REPAIR      ROBOTIC ASST    HYSTERECTOMY  11/1/2001    [facility]/BSO--cancer    PELVIC AND PARA-AORTIC LYMPH NODE DISSECTION      TN REMOVAL OF OVARY/TUBE(S)      ROBOTIC ASST    TUBAL LIGATION      UPPER GASTROINTESTINAL ENDOSCOPY         FAMILY HISTORY:    Family History   Problem Relation Age of Onset    Heart disease Mother     Diabetes Mother     Hypertension Father     Heart disease Sister     Diabetes Maternal Grandmother     Hypertension Maternal Grandmother     Breast cancer Neg Hx     Ovarian cancer Neg Hx     Uterine cancer Neg Hx     Colon cancer Neg Hx     Cancer Neg Hx     Cervical cancer Neg Hx     Endometrial cancer Neg Hx     Vaginal cancer Neg Hx     Colon polyps Neg Hx     Crohn's disease Neg Hx     Ulcerative colitis Neg Hx        SOCIAL HISTORY:    Social History     Social History    Marital status:      Spouse name: N/A    Number of children: N/A    Years of education: N/A     Occupational History    Not on file.     Social History Main Topics    Smoking status: Never Smoker    Smokeless tobacco: Never Used    Alcohol use No    Drug use: No    Sexual activity: Not Currently     Other Topics Concern    Not on file     Social History Narrative    No narrative on file       PHYSICAL EXAM:  VITAL SIGNS:   Vitals:    03/10/18 1011   BP: 137/79   BP Location: Left arm   Patient Position: Sitting   BP Method: Large (Automatic)   Pulse: 65   Temp: 97.7 °F (36.5 °C)  "  TempSrc: Oral   Weight: 86.5 kg (190 lb 11.2 oz)   Height: 5' 6" (1.676 m)     GENERAL:  Patient appears well nourished, sitting on exam table, in no acute distress.  HEENT:  Atraumatic, normocephalic, PERRLA, EOMI, no conjunctival injection, sclerae are anicteric, normal external auditory canals,TMs clear b/l, gross hearing intact to whisper, MMM, no oropharygneal erythema or exudate.  NECK:  Supple, normal ROM, trachea is midline , no supraclavicular or cervical LAD or masses palpated.  Thyroid gland not palpable.  CARDIOVASCULAR:  RRR, normal S1 and S2, no m/r/g.  RESPIRATORY:  CTA b/l, no wheezes, rhonchi, rales.  No increased work of breathing, no  use of accessory muscles.  ABDOMEN:  Soft, nontender, nondistended, normoactive bowel sounds in all four quadrants, no rebound or guarding, no HSM or masses palpated.  Normal percussion.  EXTREMITIES:  2+ DP pulses b/l, no edema.  SKIN:  Warm, no lesions on exposed skin.  NEUROMUSCULAR:  Cranial nerves II-XII grossly intact.   No clubbing or cyanosis of digits/nails.  Slow gait, she ambulates with a cane.  PSYCH:  Patient is alert and oriented to person, time, place. They are appropriately dressed and groomed. There is normal eye contact. Rate and tone of speech is normal. Normal insight, judgement. Normal thought content and process.     LABORATORY/IMAGING STUDIES:pending    ASSESSMENT/PLAN: This is a 77 y.o. female who presents to clinic for evaluation of the following concerns  1. Type 2 DM: see below  2. HTN, A-fib: see below  3. Obesity: see below  4. Hyperlipidemia: CMP, lipid panel. Patient prefers not to be on a statin.   5. [facility]    Patient readiness: acceptance and barriers:none    During the course of the visit the patient was educated and counseled about the following:     Diabetes:  Notify clinic if she has any episodes of hypoglycemia as her levemir and amaryl may need to be decreased. Will obtain CMP, lipid " panel,HgA1c. She will need a foot exam at her next visit.   Hypertension:   Medication: no change.  Obesity:   General weight loss/lifestyle modification strategies discussed (elicit support from others; identify saboteurs; non-food rewards, etc).  Diet interventions: moderate (500 kCal/d) deficit diet.  Informal exercise measures discussed, e.g. taking stairs instead of elevator.  Regular aerobic exercise program discussed.    Goals: Diabetes: Maintain Hemoglobin A1C below 7, Hypertension: Reduce Blood Pressure and Obesity: Reduce calorie intake and BMI    Did patient meet goals/outcomes: No     The following self management tools provided: declined    Patient Instructions (the written plan) was given to the patient/family.     Time spent with patient: 30 minutes    Cierra Gil MD

## 2018-04-11 ENCOUNTER — TELEPHONE (OUTPATIENT)
Dept: FAMILY MEDICINE | Facility: CLINIC | Age: 78
End: 2018-04-11

## 2018-04-12 NOTE — TELEPHONE ENCOUNTER
Attempted to call lab v[facility] unable to speak to someone that can cancel the orders. Attempted to call patient unable to leave message

## 2018-04-16 RX ORDER — BLOOD-GLUCOSE METER
EACH MISCELLANEOUS
Qty: 100 STRIP | Refills: 0 | Status: SHIPPED | OUTPATIENT
Start: 2018-04-16 | End: 2018-05-26 | Stop reason: SDUPTHER

## 2018-04-16 NOTE — TELEPHONE ENCOUNTER
I was not able to cancel the labs at [facility].  Notified patient she does not need CMP or CBC patient states understanding.

## 2018-05-28 RX ORDER — BLOOD-GLUCOSE METER
EACH MISCELLANEOUS
Qty: 100 STRIP | Refills: 0 | Status: SHIPPED | OUTPATIENT
Start: 2018-05-28 | End: 2018-06-27 | Stop reason: SDUPTHER

## 2018-06-05 RX ORDER — FUROSEMIDE 20 MG/1
TABLET ORAL
Qty: 30 TABLET | Refills: 11 | Status: SHIPPED | OUTPATIENT
Start: 2018-06-05 | End: 2018-10-23 | Stop reason: ALTCHOICE

## 2018-06-28 RX ORDER — INSULIN DETEMIR 100 [IU]/ML
INJECTION, SOLUTION SUBCUTANEOUS
Qty: 15 ML | Refills: 11 | Status: SHIPPED | OUTPATIENT
Start: 2018-06-28 | End: 2019-07-23 | Stop reason: SDUPTHER

## 2018-06-28 RX ORDER — BLOOD-GLUCOSE METER
EACH MISCELLANEOUS
Qty: 100 STRIP | Refills: 11 | Status: SHIPPED | OUTPATIENT
Start: 2018-06-28 | End: 2018-08-23 | Stop reason: SDUPTHER

## 2018-08-09 LAB
CHOLESTEROL, TOTAL: 167
HDLC SERPL-MCNC: 68 MG/DL
LDLC SERPL CALC-MCNC: 72 MG/DL
TRIGLYCERIDE (LIPID PAN): 134

## 2018-08-23 ENCOUNTER — TELEPHONE (OUTPATIENT)
Dept: FAMILY MEDICINE | Facility: CLINIC | Age: 78
End: 2018-08-23

## 2018-08-23 DIAGNOSIS — E11.8 TYPE 2 DIABETES MELLITUS WITH COMPLICATION, WITHOUT LONG-TERM CURRENT USE OF INSULIN: Primary | ICD-10-CM

## 2018-08-23 RX ORDER — INSULIN PUMP SYRINGE, 3 ML
EACH MISCELLANEOUS
Qty: 1 EACH | Refills: 0 | Status: SHIPPED | OUTPATIENT
Start: 2018-08-23 | End: 2019-08-23

## 2018-08-23 NOTE — TELEPHONE ENCOUNTER
----- Message from Larissa Moody sent at 8/23/2018 11:08 AM CDT -----  Contact: pt  Pt calling her insurance changed cause spouse retired,pt needs a new meter and streps, have not tested her sugar in a few days cause out of streps...[phone] (home)         .  [facility] [address] [place] [address] & [address] [address]  Phone: [phone] Fax: [phone]

## 2018-08-23 NOTE — TELEPHONE ENCOUNTER
Spoke with patient and told her that her diabetic supplies are at [facility]. Patient understood message.

## 2018-08-23 NOTE — TELEPHONE ENCOUNTER
----- Message from Larissa Moody sent at 8/23/2018 11:08 AM CDT -----  Contact: pt  Pt calling her insurance changed cause spouse retired,pt needs a new meter and streps, have not tested her sugar in a few days cause out of streps...[phone] (home)         .  [facility] [address] [place] [address] & [address] [address]  Phone: [phone] Fax: [phone]

## 2018-08-23 NOTE — TELEPHONE ENCOUNTER
----- Message from Theron Ham sent at 8/23/2018  3:52 PM CDT -----  Contact: Patient  Radha, [phone]. Calling to get some sample test strips for her new machine until her prescription is filled. Please advise. Thanks.

## 2018-08-27 ENCOUNTER — TELEPHONE (OUTPATIENT)
Dept: FAMILY MEDICINE | Facility: CLINIC | Age: 78
End: 2018-08-27

## 2018-08-27 NOTE — TELEPHONE ENCOUNTER
----- Message from RT Omid sent at 8/24/2018  2:30 PM CDT -----  Contact: Pt    Pt , requesting to check status of her prescription strips for the glucometer, thanks.

## 2018-09-12 ENCOUNTER — DOCUMENTATION ONLY (OUTPATIENT)
Dept: FAMILY MEDICINE | Facility: CLINIC | Age: 78
End: 2018-09-12

## 2018-09-12 NOTE — PROGRESS NOTES
Pre-Visit Chart Review  For Appointment Scheduled on 9/17/2018    Health Maintenance Due   Topic Date Due    Foot Exam  09/18/1950    TETANUS VACCINE  01/09/2008    Hemoglobin A1c  03/02/2018    Lipid Panel  03/16/2018    Influenza Vaccine  08/01/2018    Eye Exam  09/14/2018

## 2018-09-13 ENCOUNTER — TELEPHONE (OUTPATIENT)
Dept: FAMILY MEDICINE | Facility: CLINIC | Age: 78
End: 2018-09-13

## 2018-09-13 DIAGNOSIS — G20.A1 PARKINSON DISEASE: Primary | ICD-10-CM

## 2018-09-13 DIAGNOSIS — E11.8 TYPE 2 DIABETES MELLITUS WITH COMPLICATION, WITH LONG-TERM CURRENT USE OF INSULIN: Primary | ICD-10-CM

## 2018-09-13 DIAGNOSIS — Z79.4 TYPE 2 DIABETES MELLITUS WITH COMPLICATION, WITH LONG-TERM CURRENT USE OF INSULIN: Primary | ICD-10-CM

## 2018-09-13 NOTE — TELEPHONE ENCOUNTER
"Called pt regarding below message. Pt is requesting order for new walker, "Up walker". Pt is requesting this because it will allow her to walk upright better. Informed pt I will send this request to Dr. Gil. Pt is requesting order be sent to [facility] when ready. Pt verbalized understanding with no further questions.     ----- Message from Jae Brown sent at 9/12/2018  2:30 PM CDT -----  Contact: Radha  Calling to get a new walker. States it is called the Up walker. Please call her back to see how she can order this. Please call her [phone] (home)   Thanks!    "

## 2018-09-17 ENCOUNTER — TELEPHONE (OUTPATIENT)
Dept: FAMILY MEDICINE | Facility: CLINIC | Age: 78
End: 2018-09-17

## 2018-09-20 NOTE — TELEPHONE ENCOUNTER
Called pt regarding below message. Informed pt of order completed per Dr. Gil. Informed pt I have faxed this order to [facility]. Pt verbalized understanding with no further questions.

## 2018-10-08 RX ORDER — LANCETS
EACH MISCELLANEOUS
Qty: 200 EACH | Refills: 12 | Status: SHIPPED | OUTPATIENT
Start: 2018-10-08 | End: 2019-10-01 | Stop reason: ALTCHOICE

## 2018-10-08 NOTE — TELEPHONE ENCOUNTER
----- Message from Radha Hartman sent at 10/8/2018 11:01 AM CDT -----  Contact: Patient  Patient has a new glucose machine and she needs a prescription called in for the lancets that go with it. [employer] Metrix.  Patient needs some called in today because she does not have any to test with.  Call Back#[phone]  Thanks     [facility] [address] [place] [address] & [address] [address]  Phone: [phone] Fax: [phone]

## 2018-10-10 ENCOUNTER — PATIENT OUTREACH (OUTPATIENT)
Dept: ADMINISTRATIVE | Facility: HOSPITAL | Age: 78
End: 2018-10-10

## 2018-10-10 NOTE — LETTER
October 17, 2018    Radha Alas  [address] [address]             [facility]  [address]  [facility] [address]  Phone: [phone] [facility] is committed to your overall health.  To help you get the most out of each of your visits, we will review your information to make sure you are up to date on all of your recommended tests and/or procedures.       Dr. Gil has found that your chart shows you may be due for A1c, Lipid Panel, 2018 Eye Exam, & Foot Exam.     If you have had any of the above done at another facility, please bring the records or information with you so that your record at [facility] will be complete.  If you would like to schedule any of these, please contact the clinic at [phone].     If you are currently taking medication, please bring it with you to your appointment for review.     Also, if you have any type of Advanced Directives, please bring them with you to your office visit so we may scan them into your chart.     Thank You,     Your [facility] Team,   Dr Louise Amaya, LPN Clinical Care Coordinator   [facility]   [address]  [facility] [address]   Phone [phone]   Fax [phone]

## 2018-10-17 NOTE — PROGRESS NOTES
Attempted to contact pt for Previsit Notification via My Chart. Sending letter via mail now.

## 2018-10-22 ENCOUNTER — DOCUMENTATION ONLY (OUTPATIENT)
Dept: FAMILY MEDICINE | Facility: CLINIC | Age: 78
End: 2018-10-22

## 2018-10-22 NOTE — PROGRESS NOTES
Pre-Visit Chart Review  For Appointment Scheduled on 10/23/2018    Health Maintenance Due   Topic Date Due    Foot Exam  09/18/1950    TETANUS VACCINE  01/09/2008    Hemoglobin A1c  03/02/2018    Lipid Panel  03/16/2018    Influenza Vaccine  08/01/2018    Eye Exam  09/14/2018

## 2018-10-23 ENCOUNTER — OFFICE VISIT (OUTPATIENT)
Dept: FAMILY MEDICINE | Facility: CLINIC | Age: 78
End: 2018-10-23
Payer: COMMERCIAL

## 2018-10-23 ENCOUNTER — TELEPHONE (OUTPATIENT)
Dept: FAMILY MEDICINE | Facility: CLINIC | Age: 78
End: 2018-10-23

## 2018-10-23 VITALS
DIASTOLIC BLOOD PRESSURE: 80 MMHG | TEMPERATURE: 98 F | SYSTOLIC BLOOD PRESSURE: 138 MMHG | BODY MASS INDEX: 29.83 KG/M2 | HEART RATE: 56 BPM | HEIGHT: 66 IN | WEIGHT: 185.63 LBS

## 2018-10-23 DIAGNOSIS — I48.20 CHRONIC ATRIAL FIBRILLATION: ICD-10-CM

## 2018-10-23 DIAGNOSIS — I15.2 HYPERTENSION ASSOCIATED WITH DIABETES: ICD-10-CM

## 2018-10-23 DIAGNOSIS — E11.8 TYPE 2 DIABETES MELLITUS WITH COMPLICATION, WITHOUT LONG-TERM CURRENT USE OF INSULIN: Primary | ICD-10-CM

## 2018-10-23 DIAGNOSIS — G20.A1 PARKINSON DISEASE: ICD-10-CM

## 2018-10-23 DIAGNOSIS — E11.59 HYPERTENSION ASSOCIATED WITH DIABETES: ICD-10-CM

## 2018-10-23 PROCEDURE — 99999 PR PBB SHADOW E&M-EST. PATIENT-LVL IV: CPT | Mod: PBBFAC,,, | Performed by: FAMILY MEDICINE

## 2018-10-23 PROCEDURE — 99214 OFFICE O/P EST MOD 30 MIN: CPT | Mod: S$GLB,,, | Performed by: FAMILY MEDICINE

## 2018-10-23 RX ORDER — BLOOD-GLUCOSE METER
EACH MISCELLANEOUS
Refills: 0 | COMMUNITY
Start: 2018-08-23 | End: 2019-10-01 | Stop reason: ALTCHOICE

## 2018-10-23 NOTE — TELEPHONE ENCOUNTER
Can we find out if the freestyle monica or other nonstick glucometers are covered. She has a hard time with finger sticks due to parkinsons

## 2018-10-23 NOTE — PROGRESS NOTES
CHIEF COMPLAINT:  Follow up type 2 DM, HTN, hyperlipidemia.       HISTORY OF PRESENT ILLNESS:  Radha Alas is a 78 y.o. female patient who presents to clinic for follow up.     1. Type 2 DM: Patient is on amaryl, and levemir. She states that she will take 10-20 units of levemir depending on her blood sugar. She denies any hypoglycemia. A last  HgA1c was 6.4%.   She is on an ACE-I. She is not on a statin as she did not want to start the lipitor which was prescribed.  She is not up to date with her immunizations. She follows up with Dr. Tsai, ophthomology, she does not follow up with podiatry. She declines referral to podiatry. She is due for labs and a urine microalbumin/cr.     2. HTN, A-fib: Patient is on dilt XR and cardia XT, lisinopril, lasix. She is on additional potassium supplementation. She denies any CP, SOB, edema, cough.  She follows up with Dr. Tamez, cardiology         REVIEW OF SYSTEMS:  The patient denies any fever, chills, night sweats, headaches, vision changes, difficulty speaking or swallowing, decreased hearing, weight loss, weight gain, chest pain, palpitations, shortness of breath, cough, nausea, vomiting, abdominal pain, dysuria, diarrhea, constipation, hematuria, hematochezia, melena, changes in her hair, skin, nails, numbness or weakness in her extremities, erythema, pain or swelling over any of her joints, myalgias, swollen glands, easy bruising, fatigue, edema,  MEDICATIONS:   Reviewed and/or reconciled in EPIC    ALLERGIES:  Reviewed and/or reconciled in [facility]    PAST MEDICAL/SURGICAL HISTORY:   Past Medical History:   Diagnosis Date    Anemia     Atrial fibrillation     Chronic constipation     Deep vein thrombosis of left lower extremity     Diabetes mellitus     TYPE 2    Diverticulosis     Hypertension     Obesity     Parkinson disease     Peptic ulcer of stomach     Prolapse of female bladder, acquired     Rectocele     Uterine cancer 11-1-2011    STAGE 1-A GRADE 2       Past Surgical History:   Procedure Laterality Date    COLONOSCOPY  prior to 2000    COLONOSCOPY N/A 9/28/2016    Procedure: COLONOSCOPY;  Surgeon: Baljinder Ospina MD;  Location: [facility];  Service: Endoscopy;  Laterality: N/A;    COLONOSCOPY N/A 9/28/2016    Per[facility] at [facility] ENDO    EXAM UNDER ANESTHESIA N/A 9/28/2016    [facility] by Artem Spencer [facility] at [facility] OR    FLEXIBLE SIGMOIDOSCOPY  06/05/2016    at [facility]- in media section: poor prep, diverticulosis noted with old clots, internal hemorrhoids otherwise normal findings- recommend outpatient colonoscopy    FOREARM SURGERY      right forearm surgery    HERNIA REPAIR      ROBOTIC ASST    HYSTERECTOMY  11/1/2001    [facility]O--cancer    INJECTION MAJOR JOINT Right 3/29/2016    [facility]    PELVIC AND PARA-AORTIC LYMPH NODE DISSECTION      LA REMOVAL OF OVARY/TUBE(S)      ROBOTIC ASST    REPAIR-LACERATION  9/28/2016    [facility] at [facility] OR    TUBAL LIGATION      UPPER GASTROINTESTINAL ENDOSCOPY         FAMILY HISTORY:    Family History   Problem Relation Age of Onset    Heart disease Mother     Diabetes Mother     Hypertension Father     Heart disease Sister     Diabetes Maternal Grandmother     Hypertension Maternal Grandmother     Breast cancer Neg Hx     Ovarian cancer Neg Hx     Uterine cancer Neg Hx     Colon cancer Neg Hx     Cancer Neg Hx     Cervical cancer Neg Hx     Endometrial cancer Neg Hx     Vaginal cancer Neg Hx     Colon polyps Neg Hx     Crohn's disease Neg Hx     Ulcerative colitis Neg Hx        SOCIAL HISTORY:    Social History     Socioeconomic History    Marital status:      Spouse name: Not on file    Number of children: Not on file    Years of education: Not on file    Highest education level: Not on file   Social Needs    Financial resource strain: Not on file    Food insecurity - worry: Not on file    Food insecurity -  "inability: Not on file    Transportation needs - medical: Not on file    Transportation needs - non-medical: Not on file   Occupational History    Not on file   Tobacco Use    Smoking status: Never Smoker    Smokeless tobacco: Never Used   Substance and Sexual Activity    Alcohol use: No     Alcohol/week: 0.0 oz    Drug use: No    Sexual activity: Not Currently   Other Topics Concern    Not on file   Social History Narrative    Not on file       PHYSICAL EXAM:  VITAL SIGNS:   Vitals:    10/23/18 1610   BP: (!) 156/77   Pulse: (!) 56   Temp: 98.3 °F (36.8 °C)   Weight: 84.2 kg (185 lb 10 oz)   Height: 5' 6" (1.676 m)     GENERAL:  Patient appears well nourished, sitting in wheelchair in no acute distress.  HEENT:  Atraumatic, normocephalic, PERRLA, EOMI, no conjunctival injection, sclerae are anicteric, normal external auditory canals,TMs clear b/l, gross hearing intact to whisper, MMM, no oropharygneal erythema or exudate.  NECK:  Supple, normal ROM, trachea is midline , no supraclavicular or cervical LAD or masses palpated.  Thyroid gland not palpable.  CARDIOVASCULAR:  RRR, normal S1 and S2, no m/r/g.  RESPIRATORY:  CTA b/l, no wheezes, rhonchi, rales.  No increased work of breathing, no  use of accessory muscles.  ABDOMEN:  Soft, nontender, nondistended, normoactive bowel sounds in all four quadrants, no rebound or guarding, no HSM or masses palpated.  Normal percussion.  EXTREMITIES:  2+ DP pulses b/l, no edema.  SKIN:  Warm, no lesions on exposed skin.  NEUROMUSCULAR:  Cranial nerves II-XII grossly intact.   No clubbing or cyanosis of digits/nails.  Patient is in wheelchair  PSYCH:  Patient is alert and oriented to person, time, place. They are appropriately dressed and groomed. There is normal eye contact. Rate and tone of speech is normal. Normal insight, judgement. Normal thought content and process.     LABORATORY/IMAGING STUDIES:pending    ASSESSMENT/PLAN: This is a 78 y.o. female who presents to " clinic for evaluation of the following concerns  1. Type 2 diabetes mellitus with complication, without long-term current use of insulin  See below    2. Hypertension associated with diabetes  See below    3. Chronic atrial fibrillation  Stable, follow up with one of Dr. Tamez's partners.    4. Parkinson disease  She is no longer following up with neurology and declines referral to a new neurologist at this time.      Patient readiness: acceptance and barriers:none    During the course of the visit the patient was educated and counseled about the following:     Diabetes:  Notify clinic if she has any episodes of hypoglycemia as her levemir and amaryl may need to be decreased. Will obtain CMP, lipid panel,HgA1c, urine microalbumin/cr.  She will need a foot exam at her next visit.   Hypertension:   Medication: no change.  Obesity:   General weight loss/lifestyle modification strategies discussed (elicit support from others; identify saboteurs; non-food rewards, etc).  Diet interventions: moderate (500 kCal/d) deficit diet.  Informal exercise measures discussed, e.g. taking stairs instead of elevator.  Regular aerobic exercise program discussed.    Goals: Diabetes: Maintain Hemoglobin A1C below 7, Hypertension: Reduce Blood Pressure and Obesity: Reduce calorie intake and BMI    Did patient meet goals/outcomes: No     The following self management tools provided: declined    Patient Instructions (the written plan) was given to the patient/family.     Time spent with patient: 30 minutes    Cierra Gil MD

## 2018-10-24 ENCOUNTER — TELEPHONE (OUTPATIENT)
Dept: FAMILY MEDICINE | Facility: CLINIC | Age: 78
End: 2018-10-24

## 2018-10-24 NOTE — TELEPHONE ENCOUNTER
patient see yesterday. received labs from [facility] that dr. Tamez ordered. There was a cbc, cmp, lipid panel, inr.    No Hga1c. She needs to go and have that drawn, does not need the other labs.

## 2018-10-24 NOTE — TELEPHONE ENCOUNTER
Called pt regarding below message. Pt was instructed the only lab needed will be Hgb A1c which has been sent to [facility]. Pt verbalized understanding with no further questions.

## 2018-10-25 ENCOUNTER — TELEPHONE (OUTPATIENT)
Dept: ADMINISTRATIVE | Facility: HOSPITAL | Age: 78
End: 2018-10-25

## 2018-10-26 LAB
ALBUMIN SERPL-MCNC: 4.2 G/DL (ref 3.5–4.8)
ALBUMIN/GLOB SERPL: 1.4 {RATIO} (ref 1.2–2.2)
ALP SERPL-CCNC: 69 IU/L (ref 39–117)
ALT SERPL-CCNC: 17 IU/L (ref 0–32)
AST SERPL-CCNC: 19 IU/L (ref 0–40)
BASOPHILS # BLD AUTO: 0 X10E3/UL (ref 0–0.2)
BASOPHILS NFR BLD AUTO: 0 %
BILIRUB SERPL-MCNC: 0.6 MG/DL (ref 0–1.2)
BUN SERPL-MCNC: 11 MG/DL (ref 8–27)
BUN/CREAT SERPL: 18 (ref 12–28)
CALCIUM SERPL-MCNC: 9.2 MG/DL (ref 8.7–10.3)
CHLORIDE SERPL-SCNC: 104 MMOL/L (ref 96–106)
CHOLEST SERPL-MCNC: 164 MG/DL (ref 100–199)
CO2 SERPL-SCNC: 24 MMOL/L (ref 20–29)
CREAT SERPL-MCNC: 0.62 MG/DL (ref 0.57–1)
EGFR IF AFRICAN AMERICAN: 99 ML/MIN/1.73
EOSINOPHIL # BLD AUTO: 0.1 X10E3/UL (ref 0–0.4)
EOSINOPHIL NFR BLD AUTO: 1 %
ERYTHROCYTE [DISTWIDTH] IN BLOOD BY AUTOMATED COUNT: 13.9 % (ref 12.3–15.4)
EST. GFR  (NON AFRICAN AMERICAN): 86 ML/MIN/1.73
GLOBULIN SER CALC-MCNC: 3.1 G/DL (ref 1.5–4.5)
GLUCOSE SERPL-MCNC: 73 MG/DL (ref 65–99)
HBA1C MFR BLD: 6.3 % (ref 4.8–5.6)
HCT VFR BLD AUTO: 41.8 % (ref 34–46.6)
HDLC SERPL-MCNC: 67 MG/DL
HGB BLD-MCNC: 13.9 G/DL (ref 11.1–15.9)
IMM GRANULOCYTES # BLD: 0 X10E3/UL (ref 0–0.1)
IMM GRANULOCYTES NFR BLD: 0 %
LDLC SERPL CALC-MCNC: 71 MG/DL (ref 0–99)
LYMPHOCYTES # BLD AUTO: 2.2 X10E3/UL (ref 0.7–3.1)
LYMPHOCYTES NFR BLD AUTO: 43 %
MCH RBC QN AUTO: 31.4 PG (ref 26.6–33)
MCHC RBC AUTO-ENTMCNC: 33.3 G/DL (ref 31.5–35.7)
MCV RBC AUTO: 95 FL (ref 79–97)
MONOCYTES # BLD AUTO: 0.3 X10E3/UL (ref 0.1–0.9)
MONOCYTES NFR BLD AUTO: 5 %
NEUTROPHILS # BLD AUTO: 2.6 X10E3/UL (ref 1.4–7)
NEUTROPHILS NFR BLD AUTO: 51 %
PLATELET # BLD AUTO: 272 X10E3/UL (ref 150–379)
POTASSIUM SERPL-SCNC: 4.2 MMOL/L (ref 3.5–5.2)
PROT SERPL-MCNC: 7.3 G/DL (ref 6–8.5)
RBC # BLD AUTO: 4.42 X10E6/UL (ref 3.77–5.28)
SODIUM SERPL-SCNC: 145 MMOL/L (ref 134–144)
TRIGL SERPL-MCNC: 132 MG/DL (ref 0–149)
TSH SERPL DL<=0.005 MIU/L-ACNC: 1.15 UIU/ML (ref 0.45–4.5)
VLDLC SERPL CALC-MCNC: 26 MG/DL (ref 5–40)
WBC # BLD AUTO: 5.2 X10E3/UL (ref 3.4–10.8)

## 2018-10-30 DIAGNOSIS — E11.8 TYPE 2 DIABETES MELLITUS WITH COMPLICATION, WITHOUT LONG-TERM CURRENT USE OF INSULIN: Primary | ICD-10-CM

## 2018-10-30 NOTE — TELEPHONE ENCOUNTER
Called pt regarding below message. Informed pt I was able to get in touch with her insurance company and they will approve Freestyle Yoni however prescription will need to be sent to [facility] pharmacy and a PA will need to be completed. Informed pt I will send the request to Dr. Gil and return her call with additional information. Pt verbalized understanding with no further questions.     ----- Message from Nikko Ochoa sent at 10/30/2018 10:58 AM CDT -----  Contact: pt  The Pt is requesting a call back regarding the glucose meter that was to be called in but the pharmacy has not yet filled the Rx. Please call to advise.     Call Back#: [phone]  Thanks

## 2018-11-06 RX ORDER — LISINOPRIL 20 MG/1
TABLET ORAL
Qty: 30 TABLET | Refills: 11 | Status: SHIPPED | OUTPATIENT
Start: 2018-11-06 | End: 2019-10-01 | Stop reason: SDUPTHER

## 2019-02-11 ENCOUNTER — TELEPHONE (OUTPATIENT)
Dept: FAMILY MEDICINE | Facility: CLINIC | Age: 79
End: 2019-02-11

## 2019-02-11 NOTE — TELEPHONE ENCOUNTER
----- Message from Erika Medeiros sent at 2/11/2019 12:31 PM CST -----  Contact: self   Patient want to speak with a nurse regarding if she can get a full body  Massage with her health please call back at [phone] (home)

## 2019-02-11 NOTE — TELEPHONE ENCOUNTER
Patient stated that she had received a chair massager with heat as a gift and she wanted to know if she could use that. I advised her the massager was fine with her diabetes I would avoid using the heat especially since she would have to sit directly on it increasing her risk for burns. Patient verbalized understanding.

## 2019-02-18 RX ORDER — GLIMEPIRIDE 4 MG/1
TABLET ORAL
Qty: 30 TABLET | Refills: 11 | Status: SHIPPED | OUTPATIENT
Start: 2019-02-18 | End: 2019-10-01 | Stop reason: SDUPTHER

## 2019-03-01 ENCOUNTER — TELEPHONE (OUTPATIENT)
Dept: FAMILY MEDICINE | Facility: CLINIC | Age: 79
End: 2019-03-01

## 2019-03-01 NOTE — TELEPHONE ENCOUNTER
New insurance added to chart by Itz and contacted pt in regards to request.  States she has someone coming to home to evaluate for a walk in tub.  Instructed pt to notify us if there is a form that we need to complete.

## 2019-03-04 ENCOUNTER — TELEPHONE (OUTPATIENT)
Dept: FAMILY MEDICINE | Facility: CLINIC | Age: 79
End: 2019-03-04

## 2019-03-04 NOTE — TELEPHONE ENCOUNTER
Returned pt call and sent info over to doctor for referral for pt      ----- Message from Rosaura Angel sent at 3/4/2019  2:14 PM CST -----  Contact: self [phone]  Call placed to pod. Patient returned your call, please call back.  Thank you!

## 2019-03-04 NOTE — TELEPHONE ENCOUNTER
Unable to leave a message as mailbox is full to get more info on why pt needs PT.          ----- Message from Fanny Wood sent at 3/4/2019  2:04 PM CST -----  Type: Needs Medical Advice    Who Called:  Patient   Best Call Back Number: [phone]  Additional Information: Patient sent a message last week requesting an order for physical therapy, she received a return call concerning a walk in tub, which was included in the message but did not receive any information on physical therapy, please contactto advise.     Thank you

## 2019-03-04 NOTE — TELEPHONE ENCOUNTER
Please advise pt is asking for PT at her house. No recent surgeries.  Pt is reporting having trouble walking and leans forward when walking          ----- Message from Fanny Wood sent at 3/4/2019  2:04 PM CST -----  Type: Needs Medical Advice    Who Called:  Patient   Best Call Back Number: [phone]  Additional Information: Patient sent a message last week requesting an order for physical therapy, she received a return call concerning a walk in tub, which was included in the message but did not receive any information on physical therapy, please contactto advise.     Thank you

## 2019-03-08 ENCOUNTER — OFFICE VISIT (OUTPATIENT)
Dept: FAMILY MEDICINE | Facility: CLINIC | Age: 79
End: 2019-03-08
Payer: MEDICARE

## 2019-03-08 ENCOUNTER — TELEPHONE (OUTPATIENT)
Dept: NEUROLOGY | Facility: CLINIC | Age: 79
End: 2019-03-08

## 2019-03-08 VITALS
DIASTOLIC BLOOD PRESSURE: 62 MMHG | WEIGHT: 185.63 LBS | HEART RATE: 64 BPM | SYSTOLIC BLOOD PRESSURE: 124 MMHG | HEIGHT: 66 IN | TEMPERATURE: 98 F | BODY MASS INDEX: 29.83 KG/M2

## 2019-03-08 DIAGNOSIS — I15.2 HYPERTENSION ASSOCIATED WITH DIABETES: ICD-10-CM

## 2019-03-08 DIAGNOSIS — L98.9 SKIN LESION OF FOOT: ICD-10-CM

## 2019-03-08 DIAGNOSIS — I48.20 CHRONIC ATRIAL FIBRILLATION: ICD-10-CM

## 2019-03-08 DIAGNOSIS — R26.81 UNSTEADY GAIT: ICD-10-CM

## 2019-03-08 DIAGNOSIS — Z79.4 CONTROLLED TYPE 2 DIABETES MELLITUS WITHOUT COMPLICATION, WITH LONG-TERM CURRENT USE OF INSULIN: ICD-10-CM

## 2019-03-08 DIAGNOSIS — E11.59 HYPERTENSION ASSOCIATED WITH DIABETES: ICD-10-CM

## 2019-03-08 DIAGNOSIS — G20.A1 PARKINSON'S DISEASE: Primary | ICD-10-CM

## 2019-03-08 DIAGNOSIS — E11.9 CONTROLLED TYPE 2 DIABETES MELLITUS WITHOUT COMPLICATION, WITH LONG-TERM CURRENT USE OF INSULIN: ICD-10-CM

## 2019-03-08 PROCEDURE — 3074F PR MOST RECENT SYSTOLIC BLOOD PRESSURE < 130 MM HG: ICD-10-PCS | Mod: CPTII,S$GLB,, | Performed by: NURSE PRACTITIONER

## 2019-03-08 PROCEDURE — 3078F PR MOST RECENT DIASTOLIC BLOOD PRESSURE < 80 MM HG: ICD-10-PCS | Mod: CPTII,S$GLB,, | Performed by: NURSE PRACTITIONER

## 2019-03-08 PROCEDURE — 99214 OFFICE O/P EST MOD 30 MIN: CPT | Mod: S$GLB,,, | Performed by: NURSE PRACTITIONER

## 2019-03-08 PROCEDURE — 3074F SYST BP LT 130 MM HG: CPT | Mod: CPTII,S$GLB,, | Performed by: NURSE PRACTITIONER

## 2019-03-08 PROCEDURE — 99999 PR PBB SHADOW E&M-EST. PATIENT-LVL V: CPT | Mod: PBBFAC,,, | Performed by: NURSE PRACTITIONER

## 2019-03-08 PROCEDURE — 1101F PT FALLS ASSESS-DOCD LE1/YR: CPT | Mod: CPTII,S$GLB,, | Performed by: NURSE PRACTITIONER

## 2019-03-08 PROCEDURE — 3078F DIAST BP <80 MM HG: CPT | Mod: CPTII,S$GLB,, | Performed by: NURSE PRACTITIONER

## 2019-03-08 PROCEDURE — 99214 PR OFFICE/OUTPT VISIT, EST, LEVL IV, 30-39 MIN: ICD-10-PCS | Mod: S$GLB,,, | Performed by: NURSE PRACTITIONER

## 2019-03-08 PROCEDURE — 99999 PR PBB SHADOW E&M-EST. PATIENT-LVL V: ICD-10-PCS | Mod: PBBFAC,,, | Performed by: NURSE PRACTITIONER

## 2019-03-08 PROCEDURE — 1101F PR PT FALLS ASSESS DOC 0-1 FALLS W/OUT INJ PAST YR: ICD-10-PCS | Mod: CPTII,S$GLB,, | Performed by: NURSE PRACTITIONER

## 2019-03-08 RX ORDER — APIXABAN 5 MG/1
TABLET, FILM COATED ORAL
Refills: 2 | COMMUNITY
Start: 2019-02-20 | End: 2021-08-12 | Stop reason: SDUPTHER

## 2019-03-08 NOTE — PROGRESS NOTES
Subjective:       Patient ID: Radha Alas is a 78 y.o. female.    Chief Complaint: gait difficulty (eval for PT)    Ms. Alas presents to the clinic today for evaluation for physical therapy referral. She has had worsening unsteady gait and forward stooping.  She has stopped her Sinemet stating it was not helping and was causing decreased vision.  She has not had recent follow up with Neurology, Dr. Magaña.  She has not had a recent eye exam, stating she will schedule one soon. She requests a new Neurologist.  She does not believe she has Parkinson's disease and believes medications she was taking at onset of symptoms caused her tremors.  She denies any recent falls.        Review of Systems   Constitutional: Negative for chills and fever.   Eyes: Positive for visual disturbance.   Respiratory: Negative for cough and shortness of breath.    Cardiovascular: Negative for chest pain, palpitations and leg swelling.   Gastrointestinal: Negative for abdominal pain, constipation and diarrhea.   Neurological: Positive for tremors and weakness.       Objective:      Physical Exam   Constitutional: She is oriented to person, place, and time. She appears well-nourished. No distress.   HENT:   Head: Normocephalic and atraumatic.   Right Ear: External ear normal.   Left Ear: External ear normal.   Cardiovascular: Normal rate and regular rhythm. Exam reveals no gallop and no friction rub.   No murmur heard.  Pulses:       Dorsalis pedis pulses are 2+ on the right side, and 2+ on the left side.        Posterior tibial pulses are 2+ on the right side, and 2+ on the left side.   Pulmonary/Chest: Effort normal and breath sounds normal. No respiratory distress. She has no wheezes. She has no rales.   Musculoskeletal:        Right foot: There is normal range of motion and no deformity.        Left foot: There is normal range of motion and no deformity.   Feet:   Right Foot:   Skin Integrity: Negative for skin breakdown.   Left Foot:  "  Skin Integrity: Positive for skin breakdown (black skin between 4th and 5th does).   Neurological: She is alert and oriented to person, place, and time. She displays tremor. Coordination normal.   Pt currently using wheelchair   Skin: Skin is warm and dry.   Psychiatric: She has a normal mood and affect. Her behavior is normal. Thought content normal.   Vitals reviewed.          Current Outpatient Medications:     ACETAMINOPHEN (TYLENOL ARTHRITIS ORAL), Take by mouth., Disp: , Rfl:     BD INSULIN PEN NEEDLE UF MINI 31 gauge x 3/16" Ndle, USE DAILY TO ADMINISTER INSULIN, Disp: 100 each, Rfl: 11    blood sugar diagnostic (ONETOUCH VERIO) Strp, TEST THREE TIMES DAILY AS DIRECTED, Disp: 100 strip, Rfl: 11    blood-glucose meter kit, Use as instructed, Disp: 1 each, Rfl: 0    CALCIUM CARBONATE/VITAMIN D3 (VITAMIN D-3 ORAL), Take by mouth once daily., Disp: , Rfl:     carbidopa-levodopa  mg (SINEMET)  mg per tablet, Take 1 tablet by mouth 2 (two) times daily., Disp: , Rfl:     CARTIA  mg Cp24, Take one capsule every evening, Disp: , Rfl: 3    DILT- mg CDCR, Take 240 mg by mouth every morning. , Disp: , Rfl: 3    ELIQUIS 5 mg Tab, TK 1 T PO BID, Disp: , Rfl: 2    flash glucose sensor (FREESTYLE ISIDRA 10 DAY SENSOR) Kit, Wear one sensor for 10 days., Disp: 3 kit, Rfl: 11    furosemide (LASIX) 20 MG tablet, 1 TABLET ONCE DAILY (Patient taking differently: 1 TABLET ONCE EVERY OTHER DAY), Disp: 30 tablet, Rfl: 11    glimepiride (AMARYL) 4 MG tablet, TAKE 1 TABLET(4 MG) BY MOUTH DAILY WITH BREAKFAST, Disp: 30 tablet, Rfl: 11    lancets Misc, To check BG 3 times daily, to use with insurance preferred meter. True Metrix, Disp: 200 each, Rfl: 12    LEVEMIR FLEXTOUCH U-100 INSULN 100 unit/mL (3 mL) InPn pen, INJECT 30 UNITS UNDER THE SKIN EVERY NIGHT AT BEDTIME, Disp: 15 mL, Rfl: 11    lisinopril (PRINIVIL,ZESTRIL) 20 MG tablet, 1 TABLET ONCE DAILY, Disp: 30 tablet, Rfl: 11    ONETOUCH " DELICA LANCETS 33 gauge Misc, TEST SUGAR BID, Disp: , Rfl: 5    ranitidine (ZANTAC) 150 MG tablet, TAKE 1 TABLET BY MOUTH DAILY AS NEEDED, Disp: 30 tablet, Rfl: 11    RASAGILINE MESYLATE (AZILECT ORAL), Take by mouth daily as needed. , Disp: , Rfl:     TRUE METRIX GLUCOSE METER Misc, UTD, Disp: , Rfl: 0  Assessment:       1. Parkinson's disease    2. Unsteady gait    3. Skin lesion of foot    4. Controlled type 2 diabetes mellitus without complication, with long-term current use of insulin    5. Hypertension associated with diabetes    6. Chronic atrial fibrillation        Plan:       Parkinson's disease  -     Ambulatory referral to Neurology  -     Ambulatory referral to Home Health    Unsteady gait  -     Ambulatory referral to Home Health    Skin lesion foot  Podiatry referral    Controlled type 2 diabetes mellitus without complication, with long-term current use of insulin  Stable, continue current medications.  Recommended follow up with Optometry.  -     Ambulatory referral to Podiatry    Hypertension associated with diabetes  Stable, continue current medication.    Chronic atrial fibrillation  Stable, follow up Cardiology    Patient readiness: acceptance and barriers:none    During the course of the visit the patient was educated and counseled about the following:     Diabetes:  Discussed general issues about diabetes pathophysiology and management.  Hypertension:   Medication: no change.    Goals: Diabetes: Maintain Hemoglobin A1C below 7 and Hypertension: Reduce Blood Pressure    Did patient meet goals/outcomes: Yes    The following self management tools provided: declined    Patient Instructions (the written plan) was given to the patient/family.     Time spent with patient: 15 minutes    Barriers to medications present (no )    Adverse reactions to current medications (yes)    Over the counter medications reviewed (No)

## 2019-03-08 NOTE — TELEPHONE ENCOUNTER
----- Message from Denae Sanchez sent at 3/8/2019  9:26 AM CST -----  [phone] or [phone]- pt needs appt in [place] for Parkinson's disease. Please call to schedule thanks

## 2019-03-11 ENCOUNTER — OFFICE VISIT (OUTPATIENT)
Dept: PODIATRY | Facility: CLINIC | Age: 79
End: 2019-03-11
Payer: MEDICARE

## 2019-03-11 ENCOUNTER — TELEPHONE (OUTPATIENT)
Dept: FAMILY MEDICINE | Facility: CLINIC | Age: 79
End: 2019-03-11

## 2019-03-11 VITALS — HEIGHT: 66 IN | BODY MASS INDEX: 29.73 KG/M2 | WEIGHT: 185 LBS

## 2019-03-11 DIAGNOSIS — M20.40 HAMMER TOE, UNSPECIFIED LATERALITY: ICD-10-CM

## 2019-03-11 DIAGNOSIS — M20.11 VALGUS DEFORMITY OF BOTH GREAT TOES: ICD-10-CM

## 2019-03-11 DIAGNOSIS — M20.12 VALGUS DEFORMITY OF BOTH GREAT TOES: ICD-10-CM

## 2019-03-11 DIAGNOSIS — E11.49 TYPE II DIABETES MELLITUS WITH NEUROLOGICAL MANIFESTATIONS: Primary | ICD-10-CM

## 2019-03-11 PROCEDURE — 99203 PR OFFICE/OUTPT VISIT, NEW, LEVL III, 30-44 MIN: ICD-10-PCS | Mod: S$GLB,,, | Performed by: PODIATRIST

## 2019-03-11 PROCEDURE — 99999 PR PBB SHADOW E&M-EST. PATIENT-LVL III: CPT | Mod: PBBFAC,,, | Performed by: PODIATRIST

## 2019-03-11 PROCEDURE — 99999 PR PBB SHADOW E&M-EST. PATIENT-LVL III: ICD-10-PCS | Mod: PBBFAC,,, | Performed by: PODIATRIST

## 2019-03-11 PROCEDURE — 1101F PT FALLS ASSESS-DOCD LE1/YR: CPT | Mod: CPTII,S$GLB,, | Performed by: PODIATRIST

## 2019-03-11 PROCEDURE — 99203 OFFICE O/P NEW LOW 30 MIN: CPT | Mod: S$GLB,,, | Performed by: PODIATRIST

## 2019-03-11 PROCEDURE — 1101F PR PT FALLS ASSESS DOC 0-1 FALLS W/OUT INJ PAST YR: ICD-10-PCS | Mod: CPTII,S$GLB,, | Performed by: PODIATRIST

## 2019-03-11 NOTE — PROGRESS NOTES
Subjective:      Patient ID: Radha Alas is a 78 y.o. female.    Chief Complaint: Diabetic Foot Exam (KEITH Carranza 03/08/2019)    aRdha is a 78 y.o. female who presents to the clinic for evaluation and treatment of high risk feet. Radha has a past medical history of Anemia, Atrial fibrillation, Chronic constipation, Deep vein thrombosis of left lower extremity, Diabetes mellitus, Diverticulosis, Hypertension, Obesity, Parkinson disease, Peptic ulcer of stomach, Prolapse of female bladder, acquired, Rectocele, and Uterine cancer (11-1-2011). The patient's chief complaint is Diabetes, increased risk amputation needing evaluation/management/optomization of foot care.   PCP: Cierra Gil MD    Date Last Seen by PCP:   Chief Complaint   Patient presents with    Diabetic Foot Exam     KEITH Carranza 03/08/2019         Current shoe gear:  Affected Foot: Casual shoes     Unaffected Foot: Casual shoes    Hemoglobin A1C   Date Value Ref Range Status   10/25/2018 6.3 (H) 4.8 - 5.6 % Final     Comment:              Prediabetes: 5.7 - 6.4           Diabetes: >6.4           Glycemic control for adults with diabetes: <7.0     09/02/2017 6.4 (H) 4.8 - 5.6 % Final     Comment:              Pre-diabetes: 5.7 - 6.4           Diabetes: >6.4           Glycemic control for adults with diabetes: <7.0     03/16/2017 6.6 (H) 4.8 - 5.6 % Final     Comment:              Pre-diabetes: 5.7 - 6.4           Diabetes: >6.4           Glycemic control for adults with diabetes: <7.0         ROS        Objective:      Physical Exam          Assessment:       Encounter Diagnoses   Name Primary?    Type II diabetes mellitus with neurological manifestations Yes    Hammer toe, unspecified laterality     Valgus deformity of both great toes          Plan:       Radha was seen today for diabetic foot exam.    Diagnoses and all orders for this visit:    Type II diabetes mellitus with neurological manifestations  -     DIABETIC SHOES  FOR HOME USE    Hammer toe, unspecified laterality  -     DIABETIC SHOES FOR HOME USE    Valgus deformity of both great toes  -     DIABETIC SHOES FOR HOME USE      I counseled the patient on her conditions, their implications and medical management.        - Shoe inspection. Diabetic Foot Education. Patient reminded of the importance of good nutrition and blood sugar control to help prevent podiatric complications of diabetes. Patient instructed on proper foot hygeine. We discussed wearing proper shoe gear, daily foot inspections, never walking without protective shoe gear, never putting sharp instruments to feet, routine podiatric visits at least annually.      Rx DM Shoes, custom inserts.    Discussed conservative treatment with shoes of adequate dimensions, material, and style to alleviate symptoms and delay or prevent surgical intervention.          Follow-up in about 1 year (around 3/11/2020).

## 2019-03-11 NOTE — LETTER
March 11, 2019      Debbie Vazquez, FNP-C  [address] [address]           [place] [facility]  [address]  [place] [address]  Phone: [phone]          Patient: Radha Alas   MR Number: [id number]   YOB: 1940   Date of Visit: 3/11/2019       Dear Debbie Vazquez:    Thank you for referring Radha Alas to me for evaluation. Attached you will find relevant portions of my assessment and plan of care.    If you have questions, please do not hesitate to call me. I look forward to following Radha Alas along with you.    Sincerely,    Jhonathan Layton, JENSEN    Enclosure  CC:  No Recipients    If you would like to receive this communication electronically, please contact [email or web address] or [phone] to request more information on [employer] Link access.    For providers and/or their staff who would like to refer a patient to [facility], please contact us through our one-stop-shop provider referral line, [facility], at [phone].    If you feel you have received this communication in error or would no longer like to receive these types of communications, please e-mail [email or web address]

## 2019-03-11 NOTE — TELEPHONE ENCOUNTER
I spoke to patient patient confirmed  is 1940 patient states che has her  incorrect by 1 day states she never updated it. Notified patient her insurance is unable to be verified for home health. Patient gave this nurse permission to call to update information [facility] but was told patient will need to call to make the change. Call patient attempted to provide patient with phone number to customer services patient states she will call number on back of card to update her date of birth.  ===View-only below this line===    ----- Message -----  From: Catrachito Biswas LPN  Sent: 3/8/2019  10:01 AM  To: KEITH Carranza, *  Subject: information clarification needed                 oz roberson  [id number]    The name and  provided on referral do not match an account in [facility].  Please verify that listed  and [employer] number are correct with patient.    We can not accept this referral without insurance verification.      Contact our office if any further questions.      Lenzy,LPN [facility]   Intake Dept

## 2019-03-11 NOTE — TELEPHONE ENCOUNTER
"Called transferred to this nurse patient states she was unable to change  because she would have to pay patient states she has been dealing with this. Patient states " can you not call her anymore today" notified patient that this nurse will not call.  "

## 2019-03-12 ENCOUNTER — TELEPHONE (OUTPATIENT)
Dept: FAMILY MEDICINE | Facility: CLINIC | Age: 79
End: 2019-03-12

## 2019-03-12 NOTE — TELEPHONE ENCOUNTER
Spoke to pt  he states  is 1940 which is what are records shows. Pt spouse states they corrected it with [employer], should not be a problem in future.            ----- Message from Jhonathan Lima sent at 3/12/2019 11:10 AM CDT -----  Type: Needs Medical Advice    Who Called: Patient    Best Call Back Number: [phone]  Additional Information: Patient states that she would like a callback regarding her file

## 2019-03-18 ENCOUNTER — TELEPHONE (OUTPATIENT)
Dept: FAMILY MEDICINE | Facility: CLINIC | Age: 79
End: 2019-03-18

## 2019-04-09 ENCOUNTER — PATIENT OUTREACH (OUTPATIENT)
Dept: ADMINISTRATIVE | Facility: HOSPITAL | Age: 79
End: 2019-04-09

## 2019-04-09 NOTE — LETTER
April 17, 2019    Radha Alas  [address]  [facility] [address]             [facility]  [address]  [facility] [address]  Phone: [phone] Dear Vera [facility] is committed to your overall health and would like to ensure that you are up to date on your recommended test and/or procedures.   Cierra Gil MD  has found that your chart shows you may be due for the following:     YEARLY DIABETIC EYE EXAM       If you have had any of the above done at another facility, please let us know so that we may obtain copies from that facility.  If you have a copy of these records, please provide a copy for us to scan into your chart.  You are welcome to request that the report be faxed to us at  ([phone]).     Otherwise, please schedule these appointments at your earliest convenience by calling [phone] or going to [email or web address].     If you have an upcoming scheduled appointment for the item above, please disregard this letter.     Sincerely,   Your [facility]   MD Kylee Dyer LPN Clinical Care Coordinator   [facility] [address] [place] [address]   Phone [phone]   Fax [phone]

## 2019-04-23 ENCOUNTER — TELEPHONE (OUTPATIENT)
Dept: FAMILY MEDICINE | Facility: CLINIC | Age: 79
End: 2019-04-23

## 2019-04-23 ENCOUNTER — OFFICE VISIT (OUTPATIENT)
Dept: FAMILY MEDICINE | Facility: CLINIC | Age: 79
End: 2019-04-23
Payer: MEDICARE

## 2019-04-23 VITALS
TEMPERATURE: 98 F | WEIGHT: 175.94 LBS | SYSTOLIC BLOOD PRESSURE: 144 MMHG | BODY MASS INDEX: 28.27 KG/M2 | DIASTOLIC BLOOD PRESSURE: 84 MMHG | HEART RATE: 75 BPM | HEIGHT: 66 IN

## 2019-04-23 DIAGNOSIS — E11.8 TYPE 2 DIABETES MELLITUS WITH COMPLICATION, WITHOUT LONG-TERM CURRENT USE OF INSULIN: Primary | ICD-10-CM

## 2019-04-23 DIAGNOSIS — E11.59 HYPERTENSION ASSOCIATED WITH DIABETES: ICD-10-CM

## 2019-04-23 DIAGNOSIS — I15.2 HYPERTENSION ASSOCIATED WITH DIABETES: ICD-10-CM

## 2019-04-23 DIAGNOSIS — I48.20 CHRONIC ATRIAL FIBRILLATION: ICD-10-CM

## 2019-04-23 PROCEDURE — 99999 PR PBB SHADOW E&M-EST. PATIENT-LVL IV: ICD-10-PCS | Mod: PBBFAC,,, | Performed by: FAMILY MEDICINE

## 2019-04-23 PROCEDURE — 1101F PT FALLS ASSESS-DOCD LE1/YR: CPT | Mod: CPTII,S$GLB,, | Performed by: FAMILY MEDICINE

## 2019-04-23 PROCEDURE — 99214 PR OFFICE/OUTPT VISIT, EST, LEVL IV, 30-39 MIN: ICD-10-PCS | Mod: S$GLB,,, | Performed by: FAMILY MEDICINE

## 2019-04-23 PROCEDURE — 3079F DIAST BP 80-89 MM HG: CPT | Mod: CPTII,S$GLB,, | Performed by: FAMILY MEDICINE

## 2019-04-23 PROCEDURE — 3077F PR MOST RECENT SYSTOLIC BLOOD PRESSURE >= 140 MM HG: ICD-10-PCS | Mod: CPTII,S$GLB,, | Performed by: FAMILY MEDICINE

## 2019-04-23 PROCEDURE — 3079F PR MOST RECENT DIASTOLIC BLOOD PRESSURE 80-89 MM HG: ICD-10-PCS | Mod: CPTII,S$GLB,, | Performed by: FAMILY MEDICINE

## 2019-04-23 PROCEDURE — 99214 OFFICE O/P EST MOD 30 MIN: CPT | Mod: S$GLB,,, | Performed by: FAMILY MEDICINE

## 2019-04-23 PROCEDURE — 1101F PR PT FALLS ASSESS DOC 0-1 FALLS W/OUT INJ PAST YR: ICD-10-PCS | Mod: CPTII,S$GLB,, | Performed by: FAMILY MEDICINE

## 2019-04-23 PROCEDURE — 3077F SYST BP >= 140 MM HG: CPT | Mod: CPTII,S$GLB,, | Performed by: FAMILY MEDICINE

## 2019-04-23 PROCEDURE — 99999 PR PBB SHADOW E&M-EST. PATIENT-LVL IV: CPT | Mod: PBBFAC,,, | Performed by: FAMILY MEDICINE

## 2019-04-23 NOTE — PROGRESS NOTES
CHIEF COMPLAINT:  Follow up type 2 DM, HTN, hyperlipidemia.       HISTORY OF PRESENT ILLNESS:  Radha Alas is a 78 y.o. female patient who presents to clinic for follow up.     1. Type 2 DM: Patient is on amaryl, and levemir. She states that she will take 10-30 units of levemir depending on her blood sugar. She denies any hypoglycemia. A last  HgA1c was 6.3%.  She states that her fasting blood sugars have been in the 70s and two hour postprandial blood pressures have been in the 160s.  She is on an ACE-I. She is not on a statin as she did not want to start the lipitor which was prescribed.  She is not up to date with her immunizations and declines a pneumovax or prevnar.  She follows up with Dr. Tsai, ophthomology, she is up to date on her foot exam.   She is due for labs and a urine microalbumin/cr but states that she had lab work done a month ago with Dr. Mueller.      2. HTN, A-fib: Patient is on diltiazem XR and cardia XT, lisinopril, lasix. She is on additional potassium supplementation. She denies any CP, SOB, edema, cough.  She follows up with  Dr. Mueller, cardiology         REVIEW OF SYSTEMS:  The patient denies any fever, chills, night sweats, headaches, vision changes, difficulty speaking or swallowing, decreased hearing, weight loss, weight gain, chest pain, palpitations, shortness of breath, cough, nausea, vomiting, abdominal pain, dysuria, diarrhea, constipation, hematuria, hematochezia, melena, changes in her hair, skin, nails, numbness or  in her extremities, erythema, pain or swelling over any of her joints, myalgias, swollen glands, easy bruising, fatigue, edema,  MEDICATIONS:   Reviewed and/or reconciled in EPIC    ALLERGIES:  Reviewed and/or reconciled in EPIC    PAST MEDICAL/SURGICAL HISTORY:   Past Medical History:   Diagnosis Date    Anemia     Atrial fibrillation     Chronic constipation     Deep vein thrombosis of left lower extremity     Diabetes mellitus     TYPE 2     Diverticulosis     Hypertension     Obesity     Parkinson disease     Peptic ulcer of stomach     Prolapse of female bladder, acquired     Rectocele     Uterine cancer 11-1-2011    STAGE 1-A GRADE 2      Past Surgical History:   Procedure Laterality Date    COLONOSCOPY  prior to 2000    COLONOSCOPY N/A 9/28/2016    [facility] ENDO    EXAM UNDER ANESTHESIA N/A 9/28/2016    [facility] at [facility] OR    FLEXIBLE SIGMOIDOSCOPY  06/05/2016    at [facility]- in media section: poor prep, diverticulosis noted with old clots, internal hemorrhoids otherwise normal findings- recommend outpatient colonoscopy    FOREARM SURGERY      right forearm surgery    HERNIA REPAIR      ROBOTIC ASST    HYSTERECTOMY  11/1/2001    [facility]/BSO--cancer    INJECTION MAJOR JOINT Right 3/29/2016    [facility]    PELVIC AND PARA-AORTIC LYMPH NODE DISSECTION      [place] REMOVAL OF OVARY/TUBE(S)      ROBOTIC ASST    REPAIR-LACERATION  9/28/2016    [facility] OR    TUBAL LIGATION      UPPER GASTROINTESTINAL ENDOSCOPY         FAMILY HISTORY:    Family History   Problem Relation Age of Onset    Heart disease Mother     Diabetes Mother     Hypertension Father     Heart disease Sister     Diabetes Maternal Grandmother     Hypertension Maternal Grandmother     Breast cancer Neg Hx     Ovarian cancer Neg Hx     Uterine cancer Neg Hx     Colon cancer Neg Hx     Cancer Neg Hx     Cervical cancer Neg Hx     Endometrial cancer Neg Hx     Vaginal cancer Neg Hx     Colon polyps Neg Hx     Crohn's disease Neg Hx     Ulcerative colitis Neg Hx        SOCIAL HISTORY:    Social History     Socioeconomic History    Marital status:      Spouse name: Not on file    Number of children: Not on file    Years of education: Not on file    Highest education level: Not on file   Occupational History    Not on file   Social Needs    Financial  "resource strain: Not on file    Food insecurity:     Worry: Not on file     Inability: Not on file    Transportation needs:     Medical: Not on file     Non-medical: Not on file   Tobacco Use    Smoking status: Never Smoker    Smokeless tobacco: Never Used   Substance and Sexual Activity    Alcohol use: No     Alcohol/week: 0.0 oz    Drug use: No    Sexual activity: Not Currently   Lifestyle    Physical activity:     Days per week: Not on file     Minutes per session: Not on file    Stress: Not on file   Relationships    Social connections:     Talks on phone: Not on file     Gets together: Not on file     Attends Mandaen service: Not on file     Active member of club or organization: Not on file     Attends meetings of clubs or organizations: Not on file     Relationship status: Not on file   Other Topics Concern    Not on file   Social History Narrative    Not on file       PHYSICAL EXAM:  VITAL SIGNS:   Vitals:    04/23/19 1312   BP: (!) 157/82   Pulse: 68   Temp: 97.9 °F (36.6 °C)   Weight: 79.8 kg (175 lb 14.8 oz)   Height: 5' 6" (1.676 m)     GENERAL:  Patient appears well nourished, sitting in wheelchair in no acute distress.  HEENT:  Atraumatic, normocephalic, PERRLA, EOMI, no conjunctival injection, sclerae are anicteric, normal external auditory canals,TMs clear b/l, gross hearing intact to whisper, MMM, no oropharygneal erythema or exudate.  NECK:  Supple, normal ROM, trachea is midline , no supraclavicular or cervical LAD or masses palpated.  Thyroid gland not palpable.  CARDIOVASCULAR:  RRR, normal S1 and S2, no m/r/g.  RESPIRATORY:  CTA b/l, no wheezes, rhonchi, rales.  No increased work of breathing, no  use of accessory muscles.  ABDOMEN:  Soft, nontender, nondistended, normoactive bowel sounds in all four quadrants, no rebound or guarding, no HSM or masses palpated.  Normal percussion.  EXTREMITIES:  2+ DP pulses b/l, no edema.  SKIN:  Warm, no lesions on exposed skin.  NEUROMUSCULAR:  " Cranial nerves II-XII grossly intact.   No clubbing or cyanosis of digits/nails.  Patient is in wheelchair  PSYCH:  Patient is alert and oriented to person, time, place. They are appropriately dressed and groomed. There is normal eye contact. Rate and tone of speech is normal. Normal insight, judgement. Normal thought content and process.     LABORATORY/IMAGING STUDIES:pending    ASSESSMENT/PLAN: This is a 78 y.o. female who presents to clinic for evaluation of the following concerns  1. Type 2 diabetes mellitus with complication, without long-term current use of insulin  See below    2. Hypertension associated with diabetes  See below    3. Chronic atrial fibrillation  Follow up with Dr. Mueller        Patient readiness: acceptance and barriers:none    During the course of the visit the patient was educated and counseled about the following:     Diabetes:  She will notify clinic if she has any episodes of hypoglycemia as her levemir and amaryl may need to be decreased. Will obtain CMP, lipid panel,HgA1c, urine microalbumin/cr, if not recently done by Dr. Banda. Will call [facility] and obtain those results.   Hypertension:   Medication: no change.  Obesity:   General weight loss/lifestyle modification strategies discussed (elicit support from others; identify saboteurs; non-food rewards, etc).  Diet interventions: moderate (500 kCal/d) deficit diet.  Informal exercise measures discussed, e.g. taking stairs instead of elevator.  Regular aerobic exercise program discussed.    Goals: Diabetes: Maintain Hemoglobin A1C below 7, Hypertension: Reduce Blood Pressure and Obesity: Reduce calorie intake and BMI    Did patient meet goals/outcomes: No     The following self management tools provided: declined    Patient Instructions (the written plan) was given to the patient/family.     Time spent with patient: 30 minutes    Cierra Gil MD

## 2019-04-23 NOTE — TELEPHONE ENCOUNTER
Please call patient and have her to go [facility] and have my fasting labs and urine done sometime in the next 2 weeks.

## 2019-04-23 NOTE — TELEPHONE ENCOUNTER
Alexandra with [facility] is sending labs from 6 months ago for you to review. Will place in your box once we receive them.

## 2019-04-24 ENCOUNTER — TELEPHONE (OUTPATIENT)
Dept: FAMILY MEDICINE | Facility: CLINIC | Age: 79
End: 2019-04-24

## 2019-04-24 NOTE — TELEPHONE ENCOUNTER
Josette said this was just an FYI for you , pt is feeling fine just tired from [employer] still.            ----- Message from Justyna Cook sent at 4/24/2019 12:58 PM CDT -----  Contact: Josette with [facility]  Type: Needs Medical Advice    Who Called:  Josette  Kendall Call Back Number:  Additional Information: Josette is calling to report the patient's fasting blood sugar was 68 and her Bp was 140/98.Seems to be in good condition just tired from company on [employer].Please call back and advise.

## 2019-05-08 LAB
LEFT EYE DM RETINOPATHY: POSITIVE
RIGHT EYE DM RETINOPATHY: POSITIVE

## 2019-05-09 ENCOUNTER — TELEPHONE (OUTPATIENT)
Dept: NEUROLOGY | Facility: CLINIC | Age: 79
End: 2019-05-09

## 2019-05-09 ENCOUNTER — OFFICE VISIT (OUTPATIENT)
Dept: NEUROLOGY | Facility: CLINIC | Age: 79
End: 2019-05-09
Payer: MEDICARE

## 2019-05-09 VITALS
WEIGHT: 184.63 LBS | SYSTOLIC BLOOD PRESSURE: 161 MMHG | BODY MASS INDEX: 29.8 KG/M2 | DIASTOLIC BLOOD PRESSURE: 84 MMHG | HEART RATE: 80 BPM | RESPIRATION RATE: 18 BRPM

## 2019-05-09 DIAGNOSIS — G20.A1 PARKINSON DISEASE: Primary | ICD-10-CM

## 2019-05-09 PROCEDURE — 3079F DIAST BP 80-89 MM HG: CPT | Mod: CPTII,S$GLB,, | Performed by: PSYCHIATRY & NEUROLOGY

## 2019-05-09 PROCEDURE — 99204 PR OFFICE/OUTPT VISIT, NEW, LEVL IV, 45-59 MIN: ICD-10-PCS | Mod: S$GLB,,, | Performed by: PSYCHIATRY & NEUROLOGY

## 2019-05-09 PROCEDURE — 99999 PR PBB SHADOW E&M-EST. PATIENT-LVL III: ICD-10-PCS | Mod: PBBFAC,,, | Performed by: PSYCHIATRY & NEUROLOGY

## 2019-05-09 PROCEDURE — 99204 OFFICE O/P NEW MOD 45 MIN: CPT | Mod: S$GLB,,, | Performed by: PSYCHIATRY & NEUROLOGY

## 2019-05-09 PROCEDURE — 99999 PR PBB SHADOW E&M-EST. PATIENT-LVL III: CPT | Mod: PBBFAC,,, | Performed by: PSYCHIATRY & NEUROLOGY

## 2019-05-09 PROCEDURE — 3079F PR MOST RECENT DIASTOLIC BLOOD PRESSURE 80-89 MM HG: ICD-10-PCS | Mod: CPTII,S$GLB,, | Performed by: PSYCHIATRY & NEUROLOGY

## 2019-05-09 PROCEDURE — 1101F PT FALLS ASSESS-DOCD LE1/YR: CPT | Mod: CPTII,S$GLB,, | Performed by: PSYCHIATRY & NEUROLOGY

## 2019-05-09 PROCEDURE — 3077F PR MOST RECENT SYSTOLIC BLOOD PRESSURE >= 140 MM HG: ICD-10-PCS | Mod: CPTII,S$GLB,, | Performed by: PSYCHIATRY & NEUROLOGY

## 2019-05-09 PROCEDURE — 3077F SYST BP >= 140 MM HG: CPT | Mod: CPTII,S$GLB,, | Performed by: PSYCHIATRY & NEUROLOGY

## 2019-05-09 PROCEDURE — 1101F PR PT FALLS ASSESS DOC 0-1 FALLS W/OUT INJ PAST YR: ICD-10-PCS | Mod: CPTII,S$GLB,, | Performed by: PSYCHIATRY & NEUROLOGY

## 2019-05-09 RX ORDER — RASAGILINE 0.5 MG/1
0.5 TABLET ORAL DAILY
Qty: 30 TABLET | Refills: 5 | Status: SHIPPED | OUTPATIENT
Start: 2019-05-09 | End: 2019-10-01 | Stop reason: SDDI

## 2019-05-09 NOTE — LETTER
May 30, 2019      Debbie Vazquez, FNP-C  [address] [facility] [address]           [facility]  [address] [facility] [address] [place] [address]  Phone: [phone]  Fax: [phone]          Patient: Radha Alas   MR Number: [id number]   YOB: 1940   Date of Visit: 5/9/2019       Dear Debbie Vazquez:    Thank you for referring Radha Alas to me for evaluation. Attached you will find relevant portions of my assessment and plan of care.    If you have questions, please do not hesitate to call me. I look forward to following Radha Alas along with you.    Sincerely,    Kj Chavira  CC:  No Recipients    If you would like to receive this communication electronically, please contact [email or web address] or [phone] to request more information on [employer] Link access.    For providers and/or their staff who would like to refer a patient to [facility], please contact us through our one-stop-shop provider referral line, [facility], at [phone].    If you feel you have received this communication in error or would no longer like to receive these types of communications, please e-mail [email or web address]

## 2019-05-09 NOTE — TELEPHONE ENCOUNTER
Pt seen in clinic today for Parkinson's. Dr. Flanagan would like her to follow up with Daphney Dietrich; please schedule follow up in approx 3-4 months; big and loud information given in the meantime.

## 2019-05-09 NOTE — PATIENT INSTRUCTIONS
Start Azilect 0.5mg (one pill) daily in the morning.  See how you do with this.  Monitor for side effects and monitor for any improvement in tremor and walking.    You may need a higher dose, but will try this first.    Will work to get you information on the Parkinson's Exercise classes.    Will get you a follow up in the movement disorders clinic in [place].

## 2019-05-10 ENCOUNTER — TELEPHONE (OUTPATIENT)
Dept: FAMILY MEDICINE | Facility: CLINIC | Age: 79
End: 2019-05-10

## 2019-05-10 NOTE — TELEPHONE ENCOUNTER
Dr office is faxing only labs that they have on pt from February. [facility] states they only have the PT on file.                ----- Message from Esthela Peterson sent at 5/10/2019 11:33 AM CDT -----  Contact: pateint  Type: Needs Medical Advice    Who Called:  patient  Symptoms (please be specific):    How long has patient had these symptoms:    Pharmacy name and phone #:    Best Call Back Number: [phone]  Additional Information: requesting a call back regarding information in her patient's chart

## 2019-05-15 ENCOUNTER — PATIENT OUTREACH (OUTPATIENT)
Dept: ADMINISTRATIVE | Facility: HOSPITAL | Age: 79
End: 2019-05-15

## 2019-05-20 ENCOUNTER — TELEPHONE (OUTPATIENT)
Dept: FAMILY MEDICINE | Facility: CLINIC | Age: 79
End: 2019-05-20

## 2019-05-20 NOTE — TELEPHONE ENCOUNTER
Pt labs refax to [facility]. Pt stated she will have to go tomorrow since they are fasting labs.

## 2019-05-20 NOTE — TELEPHONE ENCOUNTER
----- Message from Bo Rodriguez sent at 5/20/2019 11:06 AM CDT -----  Type: Needs Medical Advice    Who Called:  Self   Symptoms (please be specific):   How long has patient had these symptoms:   Pharmacy name and phone #:  Best Call Back Number: [phone]  Additional Information: Patient called asking if the doctor faxed orders for lab work to [facility].

## 2019-05-21 ENCOUNTER — TELEPHONE (OUTPATIENT)
Dept: PAIN MEDICINE | Facility: CLINIC | Age: 79
End: 2019-05-21

## 2019-05-21 ENCOUNTER — TELEPHONE (OUTPATIENT)
Dept: FAMILY MEDICINE | Facility: CLINIC | Age: 79
End: 2019-05-21

## 2019-05-21 NOTE — TELEPHONE ENCOUNTER
Pt is now on eliquis and needs to know what can she take besides tylenol since it really does not help. Pt take 1 aleve in the am and 1 aleve at night and says that really helps her with the pain and inflammation. Pt states when she took tylenol she was taking 4 a day. Please advise

## 2019-05-21 NOTE — TELEPHONE ENCOUNTER
She will need to make a follow up appointmetn with pain management to discuss pain medications. Please help her schedule one. She has not seen them in some time

## 2019-05-21 NOTE — TELEPHONE ENCOUNTER
I need more information, what type of pain, where is it?  She should not take aleve or any other NSAIDS as she is on eliquis.

## 2019-05-21 NOTE — TELEPHONE ENCOUNTER
----- Message from Jhonathan Lima sent at 5/21/2019 11:45 AM CDT -----  Type: Needs Medical Advice    Who Called:  Patient    Best Call Back Number: [phone]  Additional Information: Patient states that she would like a callback regarding taking Aleve and Eliquis and was advised not to.  Please call to advise

## 2019-05-21 NOTE — TELEPHONE ENCOUNTER
----- Message from Ninoska Lundberg sent at 5/21/2019 10:37 AM CDT -----  Contact: Patient  Type: Needs Medical Advice    Who Called:  Patient  Best Call Back Number:   Additional Information: Calling to speak with the Nurse about medication. Please advise.

## 2019-05-21 NOTE — TELEPHONE ENCOUNTER
Pt verbalized understanding and stated she will call Dr Katz's office as we can not schedule his appointments.

## 2019-05-21 NOTE — TELEPHONE ENCOUNTER
Pt states she has arthritis in her hip and back that is why she was taking it. Advised pt to stop taking the Aleve, she wants to know what can she take since tylenol does not help.

## 2019-05-22 LAB
ALBUMIN SERPL-MCNC: 4.4 G/DL (ref 3.5–4.8)
ALBUMIN/GLOB SERPL: 1.6 {RATIO} (ref 1.2–2.2)
ALP SERPL-CCNC: 81 IU/L (ref 39–117)
ALT SERPL-CCNC: 10 IU/L (ref 0–32)
AST SERPL-CCNC: 14 IU/L (ref 0–40)
BILIRUB SERPL-MCNC: 0.6 MG/DL (ref 0–1.2)
BUN SERPL-MCNC: 13 MG/DL (ref 8–27)
BUN/CREAT SERPL: 15 (ref 12–28)
CALCIUM SERPL-MCNC: 9.6 MG/DL (ref 8.7–10.3)
CHLORIDE SERPL-SCNC: 102 MMOL/L (ref 96–106)
CHOLEST SERPL-MCNC: 172 MG/DL (ref 100–199)
CO2 SERPL-SCNC: 25 MMOL/L (ref 20–29)
CREAT SERPL-MCNC: 0.84 MG/DL (ref 0.57–1)
GLOBULIN SER CALC-MCNC: 2.8 G/DL (ref 1.5–4.5)
GLUCOSE SERPL-MCNC: 126 MG/DL (ref 65–99)
HBA1C MFR BLD: 6.4 % (ref 4.8–5.6)
HDLC SERPL-MCNC: 67 MG/DL
LDLC SERPL CALC-MCNC: 84 MG/DL (ref 0–99)
POTASSIUM SERPL-SCNC: 4.2 MMOL/L (ref 3.5–5.2)
PROT SERPL-MCNC: 7.2 G/DL (ref 6–8.5)
SODIUM SERPL-SCNC: 142 MMOL/L (ref 134–144)
TRIGL SERPL-MCNC: 107 MG/DL (ref 0–149)
VLDLC SERPL CALC-MCNC: 21 MG/DL (ref 5–40)

## 2019-05-23 ENCOUNTER — TELEPHONE (OUTPATIENT)
Dept: FAMILY MEDICINE | Facility: CLINIC | Age: 79
End: 2019-05-23

## 2019-05-23 NOTE — TELEPHONE ENCOUNTER
----- Message from Kelly Rodriguez LPN sent at 5/23/2019  4:13 PM CDT -----  Contact: Patient  Pt has had a order for [facility] but [employer] was unable to verify her insurance because patient date of birth is different with her insurance. I have tried calling her insurance for patient but was unable to make this change only patient can call and make the change. Im sending to you home health orders will need to come from pcp. Should maybe see if patient has updated date of birth with her insurance company before another order is signed.    Thanks  Kelly   ----- Message -----  From: Francesco Lantigua  Sent: 5/23/2019   3:38 PM  To: [facility] Staff    Type: Needs Medical Advice    Who Called:  Patient  Best Call Back Number: [phone]  Additional Information: Patient would like to discuss home health orders. Please call to advise. Thanks!

## 2019-05-23 NOTE — TELEPHONE ENCOUNTER
Spoke to pt to let her know she needs to contact her insurance. Pt verbalized she would and then got upset and laid the phone down to let her son know what was going on. After 5 minutes no one returned to the phone.

## 2019-05-28 ENCOUNTER — TELEPHONE (OUTPATIENT)
Dept: FAMILY MEDICINE | Facility: CLINIC | Age: 79
End: 2019-05-28

## 2019-05-28 NOTE — TELEPHONE ENCOUNTER
Please advise Dx is not added but pts date of birth is corrected DL and our records show 1940 as well as [employer] at this time. [facility] health [facility] from 2019 [facility]. Pt informed that  has been updated.

## 2019-05-28 NOTE — TELEPHONE ENCOUNTER
----- Message from Francesco Lantigua sent at 5/28/2019 12:20 PM CDT -----  Contact: Patient  Type: Needs Medical Advice    Who Called:  Patient  Best Call Back Number: [phone]  Additional Information: Patient would like to speak to Jayne Read in regards to paperwork. Please call to advise. Thanks!

## 2019-05-28 NOTE — TELEPHONE ENCOUNTER
----- Message from Marilyn Pavon sent at 5/28/2019  2:49 PM CDT -----  Contact: Aurora with [facility]  Aurora with [facility] is calling regarding additional information needed for referral sent. Patient was just discharged from their care and would need additional information to get approval from [facility]. Please call Aurora at [phone] to discuss. Thanks!.

## 2019-05-30 ENCOUNTER — TELEPHONE (OUTPATIENT)
Dept: FAMILY MEDICINE | Facility: CLINIC | Age: 79
End: 2019-05-30

## 2019-05-30 NOTE — TELEPHONE ENCOUNTER
Labs requested waiting on arrival              ----- Message from Lilly Schroeder sent at 5/30/2019  4:22 PM CDT -----  Contact: patient  Called to ask if results were received.     She can be reached at [phone]    Thanks  KB

## 2019-05-31 ENCOUNTER — TELEPHONE (OUTPATIENT)
Dept: FAMILY MEDICINE | Facility: CLINIC | Age: 79
End: 2019-05-31

## 2019-06-05 ENCOUNTER — TELEPHONE (OUTPATIENT)
Dept: FAMILY MEDICINE | Facility: CLINIC | Age: 79
End: 2019-06-05

## 2019-06-05 NOTE — TELEPHONE ENCOUNTER
Lab work at goal. Continue with current medications. Can we call [facility] and see if they ran the urine microalbumin/cr as that was ot included with the lab results.

## 2019-06-05 NOTE — TELEPHONE ENCOUNTER
Pt spouse verbalized understanding of results, he stated microalbumin/cr was not polanco. Order refaxed to [facility]. Pt spouse said he would take her to get this done.

## 2019-06-10 ENCOUNTER — TELEPHONE (OUTPATIENT)
Dept: FAMILY MEDICINE | Facility: CLINIC | Age: 79
End: 2019-06-10

## 2019-10-01 ENCOUNTER — OFFICE VISIT (OUTPATIENT)
Dept: FAMILY MEDICINE | Facility: CLINIC | Age: 79
End: 2019-10-01
Payer: MEDICARE

## 2019-10-01 VITALS
HEART RATE: 69 BPM | TEMPERATURE: 98 F | SYSTOLIC BLOOD PRESSURE: 130 MMHG | DIASTOLIC BLOOD PRESSURE: 64 MMHG | BODY MASS INDEX: 29.65 KG/M2 | WEIGHT: 184.5 LBS | HEIGHT: 66 IN

## 2019-10-01 DIAGNOSIS — R35.1 NOCTURIA: ICD-10-CM

## 2019-10-01 DIAGNOSIS — K21.9 GERD WITHOUT ESOPHAGITIS: ICD-10-CM

## 2019-10-01 DIAGNOSIS — E11.3393 CONTROLLED TYPE 2 DIABETES MELLITUS WITH BOTH EYES AFFECTED BY MODERATE NONPROLIFERATIVE RETINOPATHY WITHOUT MACULAR EDEMA, WITH LONG-TERM CURRENT USE OF INSULIN: Primary | ICD-10-CM

## 2019-10-01 DIAGNOSIS — G20.A1 PARKINSON DISEASE: ICD-10-CM

## 2019-10-01 DIAGNOSIS — M15.9 PRIMARY OSTEOARTHRITIS INVOLVING MULTIPLE JOINTS: ICD-10-CM

## 2019-10-01 DIAGNOSIS — K59.09 CHRONIC CONSTIPATION: ICD-10-CM

## 2019-10-01 DIAGNOSIS — N39.46 MIXED URGE AND STRESS INCONTINENCE: ICD-10-CM

## 2019-10-01 DIAGNOSIS — Z86.718 HISTORY OF DVT (DEEP VEIN THROMBOSIS): ICD-10-CM

## 2019-10-01 DIAGNOSIS — I48.20 CHRONIC ATRIAL FIBRILLATION: ICD-10-CM

## 2019-10-01 DIAGNOSIS — E78.5 HYPERLIPIDEMIA ASSOCIATED WITH TYPE 2 DIABETES MELLITUS: ICD-10-CM

## 2019-10-01 DIAGNOSIS — I15.2 HYPERTENSION ASSOCIATED WITH DIABETES: ICD-10-CM

## 2019-10-01 DIAGNOSIS — E11.69 HYPERLIPIDEMIA ASSOCIATED WITH TYPE 2 DIABETES MELLITUS: ICD-10-CM

## 2019-10-01 DIAGNOSIS — E11.59 HYPERTENSION ASSOCIATED WITH DIABETES: ICD-10-CM

## 2019-10-01 DIAGNOSIS — N99.3 VAGINAL VAULT PROLAPSE, POSTHYSTERECTOMY: ICD-10-CM

## 2019-10-01 DIAGNOSIS — Z79.4 CONTROLLED TYPE 2 DIABETES MELLITUS WITH BOTH EYES AFFECTED BY MODERATE NONPROLIFERATIVE RETINOPATHY WITHOUT MACULAR EDEMA, WITH LONG-TERM CURRENT USE OF INSULIN: Primary | ICD-10-CM

## 2019-10-01 DIAGNOSIS — Z85.42 HISTORY OF ENDOMETRIAL CANCER: ICD-10-CM

## 2019-10-01 DIAGNOSIS — N81.6 RECTOCELE: ICD-10-CM

## 2019-10-01 PROBLEM — M15.0 PRIMARY OSTEOARTHRITIS INVOLVING MULTIPLE JOINTS: Status: ACTIVE | Noted: 2019-10-01

## 2019-10-01 PROCEDURE — 1101F PT FALLS ASSESS-DOCD LE1/YR: CPT | Mod: CPTII,S$GLB,, | Performed by: FAMILY MEDICINE

## 2019-10-01 PROCEDURE — 99999 PR PBB SHADOW E&M-EST. PATIENT-LVL III: CPT | Mod: PBBFAC,,, | Performed by: FAMILY MEDICINE

## 2019-10-01 PROCEDURE — 99999 PR PBB SHADOW E&M-EST. PATIENT-LVL III: ICD-10-PCS | Mod: PBBFAC,,, | Performed by: FAMILY MEDICINE

## 2019-10-01 PROCEDURE — 1101F PR PT FALLS ASSESS DOC 0-1 FALLS W/OUT INJ PAST YR: ICD-10-PCS | Mod: CPTII,S$GLB,, | Performed by: FAMILY MEDICINE

## 2019-10-01 PROCEDURE — 3075F SYST BP GE 130 - 139MM HG: CPT | Mod: CPTII,S$GLB,, | Performed by: FAMILY MEDICINE

## 2019-10-01 PROCEDURE — 3078F PR MOST RECENT DIASTOLIC BLOOD PRESSURE < 80 MM HG: ICD-10-PCS | Mod: CPTII,S$GLB,, | Performed by: FAMILY MEDICINE

## 2019-10-01 PROCEDURE — 3075F PR MOST RECENT SYSTOLIC BLOOD PRESS GE 130-139MM HG: ICD-10-PCS | Mod: CPTII,S$GLB,, | Performed by: FAMILY MEDICINE

## 2019-10-01 PROCEDURE — 3078F DIAST BP <80 MM HG: CPT | Mod: CPTII,S$GLB,, | Performed by: FAMILY MEDICINE

## 2019-10-01 PROCEDURE — 99214 OFFICE O/P EST MOD 30 MIN: CPT | Mod: S$GLB,,, | Performed by: FAMILY MEDICINE

## 2019-10-01 PROCEDURE — 99214 PR OFFICE/OUTPT VISIT, EST, LEVL IV, 30-39 MIN: ICD-10-PCS | Mod: S$GLB,,, | Performed by: FAMILY MEDICINE

## 2019-10-01 RX ORDER — GLIMEPIRIDE 4 MG/1
TABLET ORAL
Qty: 90 TABLET | Refills: 3 | Status: SHIPPED | OUTPATIENT
Start: 2019-10-01 | End: 2020-11-13 | Stop reason: SDUPTHER

## 2019-10-01 RX ORDER — LISINOPRIL 20 MG/1
TABLET ORAL
Qty: 90 TABLET | Refills: 3 | Status: SHIPPED | OUTPATIENT
Start: 2019-10-01 | End: 2020-08-23 | Stop reason: SDUPTHER

## 2019-10-01 NOTE — PROGRESS NOTES
Subjective:       Patient ID: Radha Alas is a 79 y.o. female.    Chief Complaint: Follow-up    Diabetes   She has type 2 diabetes mellitus. No MedicAlert identification noted. The initial diagnosis of diabetes was made 10 years ago. Hypoglycemia symptoms include tremors. Pertinent negatives for hypoglycemia include no confusion, dizziness, headaches, hunger, mood changes, nervousness/anxiousness, pallor, seizures, sleepiness, speech difficulty or sweats. Associated symptoms include blurred vision, fatigue, polyuria, visual change, weakness and weight loss. Pertinent negatives for diabetes include no chest pain, no foot paresthesias, no foot ulcerations, no polydipsia and no polyphagia. Hypoglycemia complications include required assistance. Pertinent negatives for hypoglycemia complications include no blackouts, no hospitalization, no nocturnal hypoglycemia and no required glucagon injection. Symptoms are stable. Diabetic complications include retinopathy. Pertinent negatives for diabetic complications include no autonomic neuropathy, CVA, heart disease, impotence, nephropathy, peripheral neuropathy or PVD. Risk factors for coronary artery disease include family history, hypertension and post-menopausal. Current diabetic treatment includes diet, insulin injections and oral agent (monotherapy). She is compliant with treatment all of the time. She is currently taking insulin at bedtime. Insulin injections are given by patient. Rotation sites for injection include the abdominal wall. Her weight is stable. She is following a generally healthy and low salt diet. Meal planning includes avoidance of concentrated sweets. She has not had a previous visit with a dietitian. She participates in exercise three times a week. She monitors blood glucose at home 1-2 x per day. She monitors urine at home <1 x per month. Blood glucose monitoring compliance is excellent. Her home blood glucose trend is fluctuating minimally. She does  not see a podiatrist.Eye exam is current.     Patient presents to establish care with a new family doctor, to complete routine labs and for medication refills.  She was previously followed by Dr. Gil last seen in April of this year.  She tells me she has been blessed.  I have been good since last seen and has no new concerns or complaints today.  She continues to follow with her cardiologist (Dr. Mueller), her ophthalmologist (Dr. Tsai), her gastroenterologist (Dr. Ospina) and her podiatrist (Dr. Layton) but tells me she is no longer following with her neurologist (Dr. Flanagan) or urogyn (Dr. Cintron) as she tells me her Parkinson symptoms mainly which consist of bilateral resting tremor, shuffling slow gait and some drooling do not bother her enough to take a medication and she notes she never tried Azilect or followed up with Neurology after her initial appointment about 5 months ago.  She has a history of type 2 diabetes but she tells me has been well controlled chronically and she is requesting a refill of her Amaryl and Levemir today.  She has done well on the medications and tells me she has been following a diabetic diet very closely and is now taking no more than 20 units nightly and notes depending on her 2 hr postprandial blood sugar after dinner and her blood sugar before bed she reduce this on her own if necessary.  She tells me she has done very well with this and has not had any episodes of hypoglycemia since last seen.  She notes her lowest fasting blood sugar in the mornings for about the past 3-4 months has been 71 and her highest has been in the 120s.  Postprandial values 2 hr after breakfast, lunch and dinner generally run in the 130-160 range with the highest numbers after dinner.  She tells me she is almost certain she has not had a value greater than 180 in past months postprandial.  She does take lisinopril and tells me the risks of the medication were discussed with her at her last  visit including a possible risk of increased lung cancer but she has no concerns about this and did not want to change medications as this has always worked well for her.  She is also taking this for her blood pressures which have been well controlled and she notes that her cardiologist stopped her Lasix and potassium quite sometime ago as she was having some low blood pressures.  She is monitoring her blood pressures daily at home and tells me these are generally running around 110's-120/60's.  She has not had any issues with lower extremity edema with discontinuing medication.  She has a history of chronic atrial fibrillation and is anticoagulated on Eliquis for this as well as a history of DVT.  She is also taking diltiazem and cardia XT.  She has a history of GERD but tells me since taking Zantac nightly she has not had any breakthrough acid reflux symptoms.  She notes she was told she could starting the medication every other day or every 3rd day but she continues to take this daily as directed initially as it works well.  She is following lifestyle modifications for GERD as well as a diabetic diet closely.  She notes she is not very active although she does walk around her house with her Rollator.  She tells me she walks to slow anymore to walk outside and generally uses a wheelchair for this.  She has a history of stress and urge incontinence, nocturia, hematuria, rectocele, vaginal prolapse post hysterectomy secondary to endometrial cancer and chronic constipation but she tells me with use of a pessary, scheduled toileting and high-fiber diet she has not had any bowel or bladder problems whatsoever.  She does not use pads and denies any leaking day or night.  She has a history of osteoarthritis in her hips, back, hands and knees bilateral but tells me these do not give her many problems and she only uses an unknown topical product for pain relief when necessary.  She denies any pains currently.  She has no  other concerns or complaints.  Question her about health maintenance issues she tells me she has refused all vaccinations in past years as I am too old for all that.  Upon review of systems she admits to fatigue but tells me this is chronic and unchanged and some slow weight loss but notes that she has been trying to lose weight by cutting calories and portion sizes.  Her blurry vision is mild bilateral and she is up-to-date with eye exams without significant change in her moderate nonproliferative retinopathy.  She reports polyuria but this is secondary to her frequent scheduled toileting.  She reports generalized weakness but this is chronic and unchanged.      Review of Systems   Constitutional: Positive for fatigue and weight loss. Negative for activity change, appetite change and unexpected weight change.   HENT: Positive for drooling. Negative for congestion, hearing loss, sore throat, trouble swallowing and voice change.    Eyes: Positive for blurred vision and visual disturbance. Negative for photophobia, pain, discharge, redness and itching.   Respiratory: Negative for cough, chest tightness, shortness of breath and wheezing.    Cardiovascular: Negative for chest pain, palpitations and leg swelling.   Gastrointestinal: Negative for abdominal pain, blood in stool, constipation, nausea and vomiting.   Endocrine: Positive for polyuria. Negative for polydipsia and polyphagia.   Genitourinary: Negative for decreased urine volume, difficulty urinating, dysuria, enuresis, flank pain, frequency, hematuria, impotence, pelvic pain, urgency, vaginal bleeding, vaginal discharge and vaginal pain.   Musculoskeletal: Negative for arthralgias, back pain, gait problem, joint swelling, myalgias, neck pain and neck stiffness.   Skin: Negative for pallor, rash and wound.   Neurological: Positive for tremors and weakness. Negative for dizziness, seizures, speech difficulty, light-headedness, numbness and headaches.  "  Hematological: Negative for adenopathy. Does not bruise/bleed easily.   Psychiatric/Behavioral: Negative for confusion, dysphoric mood and sleep disturbance. The patient is not nervous/anxious.          Objective:      Vitals:    10/01/19 1451   BP: 130/64   Pulse: 69   Temp: 98 °F (36.7 °C)   Weight: 83.7 kg (184 lb 8.4 oz)   Height: 5' 6" (1.676 m)   PainSc: 0-No pain     Physical Exam   Constitutional: She is oriented to person, place, and time. Vital signs are normal. She appears well-developed and well-nourished.  Non-toxic appearance. She does not have a sickly appearance. She does not appear ill. No distress.   Physical exam was completed in her wheelchair as she was unsure if she could step up onto the exam table even with assistance.  Bilateral upper extremity resting tremor present.   HENT:   Head: Normocephalic and atraumatic.   Right Ear: External ear normal.   Left Ear: External ear normal.   Nose: Nose normal.   Mouth/Throat: Oropharynx is clear and moist.   Eyes: Pupils are equal, round, and reactive to light. Conjunctivae and EOM are normal. No scleral icterus.   Neck: Trachea normal, normal range of motion and phonation normal. Neck supple. No JVD present. No thyroid mass and no thyromegaly present.   Cardiovascular: Normal rate, regular rhythm and normal heart sounds. Exam reveals no gallop and no friction rub.   No murmur heard.  Pulmonary/Chest: Effort normal and breath sounds normal. No respiratory distress. She has no wheezes.   Abdominal: Soft. Bowel sounds are normal. She exhibits no distension and no mass. There is no tenderness. There is no guarding.   Musculoskeletal: Normal range of motion. She exhibits no edema or deformity.   Diabetic foot exam was deferred secondary to difficulty with removing and putting her shoes back on.   Lymphadenopathy:     She has no cervical adenopathy.   Neurological: She is alert and oriented to person, place, and time.   Skin: Skin is warm and dry. She is " not diaphoretic.   Psychiatric: She has a normal mood and affect. Her behavior is normal. Judgment and thought content normal.   Nursing note and vitals reviewed.        Assessment:       1. 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 Parkinson disease    3. History of DVT (deep vein thrombosis)    4. Primary osteoarthritis involving multiple joints    5. Hypertension associated with diabetes    6. Hyperlipidemia associated with type 2 diabetes mellitus    7. Chronic atrial fibrillation    8. Chronic constipation    9. 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 History of endometrial cancer    12. GERD without esophagitis    13. Mixed urge and stress incontinence    14. 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, with long-term current use of insulin  -     Hemoglobin A1c; Future; Expected date: 10/01/2019  -     Microalbumin/creatinine urine ratio; Future  -     glimepiride (AMARYL) 4 MG tablet; TAKE 1 TABLET(4 MG) BY MOUTH DAILY WITH BREAKFAST  Dispense: 90 tablet; Refill: 3  -     insulin detemir U-100 (LEVEMIR FLEXTOUCH U-100 INSULN) 100 unit/mL (3 mL) SubQ InPn pen; INJECT 20 UNITS UNDER THE SKIN EVERY NIGHT AT BEDTIME  Dispense: 18 mL; Refill: 3  -     lisinopril (PRINIVIL,ZESTRIL) 20 MG tablet; 1 TABLET ONCE DAILY  Dispense: 90 tablet; Refill: 3    Parkinson disease    History of DVT (deep vein thrombosis)    Primary osteoarthritis involving multiple joints    Hypertension associated with diabetes  -     CBC auto differential; Future; Expected date: 10/01/2019  -     Comprehensive metabolic panel; Future; Expected date: 10/01/2019  -     TSH; Future; Expected date: 10/01/2019  -     Microalbumin/creatinine urine ratio; Future  -     lisinopril (PRINIVIL,ZESTRIL)  20 MG tablet; 1 TABLET ONCE DAILY  Dispense: 90 tablet; Refill: 3    Hyperlipidemia associated with type 2 diabetes mellitus  -     Lipid panel; Future; Expected date: 10/01/2019    Chronic atrial fibrillation    Chronic constipation    Rectocele    Vaginal vault prolapse, posthysterectomy    History of endometrial cancer    GERD without esophagitis  -     ranitidine (ZANTAC) 150 MG tablet; Take 1 tablet (150 mg total) by mouth nightly.  Dispense: 90 tablet; Refill: 3    Mixed urge and stress incontinence    Nocturia      Follow up in about 6 months (around 4/1/2020).    Radha appears to be stable and doing well today on her current medications and I reviewed the risks and benefits of all of these with her.  She needs refills of Zantac, lisinopril, her insulin, and Amaryl I will refill these for her unchanged.  She will continue to monitor her blood sugars and will alert me if she has any signs or symptoms of hypoglycemia.  I did review hypoglycemia treatment options with her and she was able to repeat these back to me with good recall.  Her blood pressures appears borderline borderline today but she reports good blood pressure control at home on her current medications without any lower extremity edema with discontinuing Lasix as she reports she did have some past edema infrequently even on the medication.  I recommended no changes and advised her only to alert me if she has blood pressures 130/80 or greater.  She will continue follow with her cardiologist, ophthalmologist, podiatrist and gastroenterologist as directed and has no interest in following with urogyn any longer denying all symptoms as above.  She also has no interest in following with Neurology any longer or trying any other medications for Parkinson's at this time.  I discussed labs as above with her and she was interested in completing all of these but was not fasting and will return later this week to complete them.  As long as her labs show no  significant abnormalities and she continues to do well she wanted to be seen back at 6 month interval and I am fine with this.  Sooner if any issues.  I did review the risks and benefits of the influenza, Tdap, pneumococcal 13 and Shingrix vaccines with her as well as the risks of avoiding these and she still has no interest in any vaccinations.  I recommended she wear easy to remove and replace shoes to her 6 month follow-up visit and alert me if she has any problems with her feet prior to this.

## 2019-10-04 ENCOUNTER — LAB VISIT (OUTPATIENT)
Dept: LAB | Facility: HOSPITAL | Age: 79
End: 2019-10-04
Attending: FAMILY MEDICINE
Payer: MEDICARE

## 2019-10-04 DIAGNOSIS — I15.2 HYPERTENSION ASSOCIATED WITH DIABETES: ICD-10-CM

## 2019-10-04 DIAGNOSIS — E11.69 HYPERLIPIDEMIA ASSOCIATED WITH TYPE 2 DIABETES MELLITUS: ICD-10-CM

## 2019-10-04 DIAGNOSIS — E11.3393 CONTROLLED TYPE 2 DIABETES MELLITUS WITH BOTH EYES AFFECTED BY MODERATE NONPROLIFERATIVE RETINOPATHY WITHOUT MACULAR EDEMA, WITH LONG-TERM CURRENT USE OF INSULIN: ICD-10-CM

## 2019-10-04 DIAGNOSIS — Z79.4 CONTROLLED TYPE 2 DIABETES MELLITUS WITH BOTH EYES AFFECTED BY MODERATE NONPROLIFERATIVE RETINOPATHY WITHOUT MACULAR EDEMA, WITH LONG-TERM CURRENT USE OF INSULIN: ICD-10-CM

## 2019-10-04 DIAGNOSIS — E78.5 HYPERLIPIDEMIA ASSOCIATED WITH TYPE 2 DIABETES MELLITUS: ICD-10-CM

## 2019-10-04 DIAGNOSIS — E11.59 HYPERTENSION ASSOCIATED WITH DIABETES: ICD-10-CM

## 2019-10-04 LAB
ALBUMIN SERPL BCP-MCNC: 3.8 G/DL (ref 3.5–5.2)
ALP SERPL-CCNC: 73 U/L (ref 55–135)
ALT SERPL W/O P-5'-P-CCNC: 12 U/L (ref 10–44)
ANION GAP SERPL CALC-SCNC: 10 MMOL/L (ref 8–16)
AST SERPL-CCNC: 16 U/L (ref 10–40)
BASOPHILS # BLD AUTO: 0.02 K/UL (ref 0–0.2)
BASOPHILS NFR BLD: 0.4 % (ref 0–1.9)
BILIRUB SERPL-MCNC: 1 MG/DL (ref 0.1–1)
BUN SERPL-MCNC: 14 MG/DL (ref 8–23)
CALCIUM SERPL-MCNC: 9.4 MG/DL (ref 8.7–10.5)
CHLORIDE SERPL-SCNC: 105 MMOL/L (ref 95–110)
CHOLEST SERPL-MCNC: 161 MG/DL (ref 120–199)
CHOLEST/HDLC SERPL: 2.3 {RATIO} (ref 2–5)
CO2 SERPL-SCNC: 27 MMOL/L (ref 23–29)
CREAT SERPL-MCNC: 0.8 MG/DL (ref 0.5–1.4)
DIFFERENTIAL METHOD: ABNORMAL
EOSINOPHIL # BLD AUTO: 0 K/UL (ref 0–0.5)
EOSINOPHIL NFR BLD: 0.8 % (ref 0–8)
ERYTHROCYTE [DISTWIDTH] IN BLOOD BY AUTOMATED COUNT: 12.5 % (ref 11.5–14.5)
EST. GFR  (AFRICAN AMERICAN): >60 ML/MIN/1.73 M^2
EST. GFR  (NON AFRICAN AMERICAN): >60 ML/MIN/1.73 M^2
ESTIMATED AVG GLUCOSE: 126 MG/DL (ref 68–131)
GLUCOSE SERPL-MCNC: 72 MG/DL (ref 70–110)
HBA1C MFR BLD HPLC: 6 % (ref 4–5.6)
HCT VFR BLD AUTO: 42.3 % (ref 37–48.5)
HDLC SERPL-MCNC: 70 MG/DL (ref 40–75)
HDLC SERPL: 43.5 % (ref 20–50)
HGB BLD-MCNC: 13.1 G/DL (ref 12–16)
IMM GRANULOCYTES # BLD AUTO: 0.01 K/UL (ref 0–0.04)
IMM GRANULOCYTES NFR BLD AUTO: 0.2 % (ref 0–0.5)
LDLC SERPL CALC-MCNC: 77 MG/DL (ref 63–159)
LYMPHOCYTES # BLD AUTO: 2 K/UL (ref 1–4.8)
LYMPHOCYTES NFR BLD: 41.5 % (ref 18–48)
MCH RBC QN AUTO: 30.8 PG (ref 27–31)
MCHC RBC AUTO-ENTMCNC: 31 G/DL (ref 32–36)
MCV RBC AUTO: 99 FL (ref 82–98)
MONOCYTES # BLD AUTO: 0.3 K/UL (ref 0.3–1)
MONOCYTES NFR BLD: 6.5 % (ref 4–15)
NEUTROPHILS # BLD AUTO: 2.4 K/UL (ref 1.8–7.7)
NEUTROPHILS NFR BLD: 50.6 % (ref 38–73)
NONHDLC SERPL-MCNC: 91 MG/DL
NRBC BLD-RTO: 0 /100 WBC
PLATELET # BLD AUTO: 262 K/UL (ref 150–350)
PMV BLD AUTO: 10.4 FL (ref 9.2–12.9)
POTASSIUM SERPL-SCNC: 4.2 MMOL/L (ref 3.5–5.1)
PROT SERPL-MCNC: 7.2 G/DL (ref 6–8.4)
RBC # BLD AUTO: 4.26 M/UL (ref 4–5.4)
SODIUM SERPL-SCNC: 142 MMOL/L (ref 136–145)
TRIGL SERPL-MCNC: 70 MG/DL (ref 30–150)
TSH SERPL DL<=0.005 MIU/L-ACNC: 0.67 UIU/ML (ref 0.4–4)
WBC # BLD AUTO: 4.77 K/UL (ref 3.9–12.7)

## 2019-10-04 PROCEDURE — 36415 COLL VENOUS BLD VENIPUNCTURE: CPT | Mod: PO

## 2019-10-04 PROCEDURE — 83036 HEMOGLOBIN GLYCOSYLATED A1C: CPT

## 2019-10-04 PROCEDURE — 84443 ASSAY THYROID STIM HORMONE: CPT

## 2019-10-04 PROCEDURE — 80053 COMPREHEN METABOLIC PANEL: CPT

## 2019-10-04 PROCEDURE — 85025 COMPLETE CBC W/AUTO DIFF WBC: CPT

## 2019-10-04 PROCEDURE — 80061 LIPID PANEL: CPT

## 2019-11-18 RX ORDER — PEN NEEDLE, DIABETIC 30 GX3/16"
NEEDLE, DISPOSABLE MISCELLANEOUS
Qty: 300 EACH | Refills: 3 | Status: SHIPPED | OUTPATIENT
Start: 2019-11-18 | End: 2020-07-02 | Stop reason: SDUPTHER

## 2019-12-30 ENCOUNTER — TELEPHONE (OUTPATIENT)
Dept: FAMILY MEDICINE | Facility: CLINIC | Age: 79
End: 2019-12-30

## 2019-12-30 NOTE — TELEPHONE ENCOUNTER
----- Message from Marisa Samuels sent at 12/30/2019 11:42 AM CST -----  Contact: Radha pt  Type: Needs Medical Advice    Who Called:  Radha  Best Call Back Number: [phone]  Additional Information: Pls call pt regarding getting home health/ she is having problems getting out of bed and taking baths. Pls call for further

## 2020-01-07 ENCOUNTER — TELEPHONE (OUTPATIENT)
Dept: FAMILY MEDICINE | Facility: CLINIC | Age: 80
End: 2020-01-07

## 2020-01-07 NOTE — TELEPHONE ENCOUNTER
Left message on voicemail for patient to call back to reschedule her appointment,  called in sick today.

## 2020-01-09 ENCOUNTER — OFFICE VISIT (OUTPATIENT)
Dept: FAMILY MEDICINE | Facility: CLINIC | Age: 80
End: 2020-01-09
Payer: MEDICARE

## 2020-01-09 VITALS
TEMPERATURE: 98 F | BODY MASS INDEX: 28.09 KG/M2 | HEART RATE: 71 BPM | DIASTOLIC BLOOD PRESSURE: 82 MMHG | WEIGHT: 174.81 LBS | HEIGHT: 66 IN | SYSTOLIC BLOOD PRESSURE: 136 MMHG

## 2020-01-09 DIAGNOSIS — R53.81 PHYSICAL DEBILITY: ICD-10-CM
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
## 2020-01-09 DIAGNOSIS — G20.A1 PARKINSON DISEASE: ICD-10-CM

## 2020-01-09 PROCEDURE — 1101F PT FALLS ASSESS-DOCD LE1/YR: CPT | Mod: CPTII,S$GLB,, | Performed by: FAMILY MEDICINE

## 2020-01-09 PROCEDURE — 3079F PR MOST RECENT DIASTOLIC BLOOD PRESSURE 80-89 MM HG: ICD-10-PCS | Mod: CPTII,S$GLB,, | Performed by: FAMILY MEDICINE

## 2020-01-09 PROCEDURE — 99999 PR PBB SHADOW E&M-EST. PATIENT-LVL III: ICD-10-PCS | Mod: PBBFAC,,, | Performed by: FAMILY MEDICINE

## 2020-01-09 PROCEDURE — 99213 PR OFFICE/OUTPT VISIT, EST, LEVL III, 20-29 MIN: ICD-10-PCS | Mod: S$GLB,,, | Performed by: FAMILY MEDICINE

## 2020-01-09 PROCEDURE — 3079F DIAST BP 80-89 MM HG: CPT | Mod: CPTII,S$GLB,, | Performed by: FAMILY MEDICINE

## 2020-01-09 PROCEDURE — 99999 PR PBB SHADOW E&M-EST. PATIENT-LVL III: CPT | Mod: PBBFAC,,, | Performed by: FAMILY MEDICINE

## 2020-01-09 PROCEDURE — 1159F PR MEDICATION LIST DOCUMENTED IN MEDICAL RECORD: ICD-10-PCS | Mod: S$GLB,,, | Performed by: FAMILY MEDICINE

## 2020-01-09 PROCEDURE — 1159F MED LIST DOCD IN RCRD: CPT | Mod: S$GLB,,, | Performed by: FAMILY MEDICINE

## 2020-01-09 PROCEDURE — 99213 OFFICE O/P EST LOW 20 MIN: CPT | Mod: S$GLB,,, | Performed by: FAMILY MEDICINE

## 2020-01-09 PROCEDURE — 1126F AMNT PAIN NOTED NONE PRSNT: CPT | Mod: S$GLB,,, | Performed by: FAMILY MEDICINE

## 2020-01-09 PROCEDURE — 3075F PR MOST RECENT SYSTOLIC BLOOD PRESS GE 130-139MM HG: ICD-10-PCS | Mod: CPTII,S$GLB,, | Performed by: FAMILY MEDICINE

## 2020-01-09 PROCEDURE — 1101F PR PT FALLS ASSESS DOC 0-1 FALLS W/OUT INJ PAST YR: ICD-10-PCS | Mod: CPTII,S$GLB,, | Performed by: FAMILY MEDICINE

## 2020-01-09 PROCEDURE — 1126F PR PAIN SEVERITY QUANTIFIED, NO PAIN PRESENT: ICD-10-PCS | Mod: S$GLB,,, | Performed by: FAMILY MEDICINE

## 2020-01-09 PROCEDURE — 3075F SYST BP GE 130 - 139MM HG: CPT | Mod: CPTII,S$GLB,, | Performed by: FAMILY MEDICINE

## 2020-01-09 NOTE — PROGRESS NOTES
Subjective:       Patient ID: Radha Alas is a 79 y.o. female.    Chief Complaint: Home health evaluation    HPI   Patient presents requesting a referral to home health.  Questioning her about this she tells me she had a fall 3-4 weeks ago where she ran her walker in to the edge of the wall and then fell forward onto both of her knees.  She tells me she has some minor pain with this and possibly some swelling that night but her  was able to help her back to her feet and she was able to continue ambulating with her wheeled walker with all pains and swelling resolving by the next morning.  She did not lose consciousness, strike her head or have any other injury.  She tells me she needs home health to come in and to fix her house as she scraped up her well with her walker.  She also tells me she has several chores around the house and things to be fixed in the kitchen that she would like repaired and believes home health will complete these tasks for her as she tells me her  will not.  She tells me she does need assistance with preparing meals and some help with dressing but tells me that her  does all of this for her and she does not need anyone else for this.  She has not had any further falls or near falls, denies any decrease in strength, coordination or sensation from her baseline Parkinson symptoms and reports no pains, injuries or other problems/concerns today.  She still has no interest in follow-up her treatment for Parkinson's and does not believe that this played a role in her fall.  She does not believe any physical therapy to improve her gait or stability is necessary.  She is ambulating in the house with her wheeled walker with difficulty.    Review of Systems   Constitutional: Positive for fatigue (Unchanged.). Negative for activity change, appetite change and unexpected weight change.   Respiratory: Negative for cough, chest tightness, shortness of breath and wheezing.   "  Cardiovascular: Negative for chest pain, palpitations and leg swelling.   Gastrointestinal: Negative for abdominal pain, blood in stool, constipation, diarrhea, nausea and vomiting.   Genitourinary: Negative for difficulty urinating.   Musculoskeletal: Negative for arthralgias and myalgias.   Skin: Negative for rash and wound.   Neurological: Positive for tremors (Unchanged) and weakness (Unchanged). Negative for dizziness, syncope and light-headedness.   Hematological: Negative for adenopathy. Does not bruise/bleed easily.         Objective:      Vitals:    01/09/20 1429   BP: 136/82   Pulse: 71   Temp: 98.1 °F (36.7 °C)   TempSrc: Oral   Weight: 79.3 kg (174 lb 13.2 oz)   Height: 5' 6" (1.676 m)   PainSc: 0-No pain     Physical Exam   Constitutional: She is oriented to person, place, and time. Vital signs are normal. She appears well-developed and well-nourished.  Non-toxic appearance. She does not have a sickly appearance. She does not appear ill. No distress.   She was able to stand up and ambulated to the exam table but became shaky with one-person assistance and trying to step up onto the exam table and her examination was completed in the exam room chair.  Upper extremity tremor appears unchanged and she has a shuffling gait.   HENT:   Head: Normocephalic and atraumatic.   Cardiovascular: Normal rate, regular rhythm and normal heart sounds. Exam reveals no gallop and no friction rub.   No murmur heard.  Pulmonary/Chest: Effort normal and breath sounds normal. No respiratory distress. She has no wheezes. She exhibits no tenderness.   Musculoskeletal: Normal range of motion. She exhibits no edema, tenderness or deformity.        Right hip: Normal.        Left hip: Normal.        Right knee: Normal.        Left knee: Normal.        Right ankle: Normal.        Left ankle: Normal.        Right upper leg: Normal.        Left upper leg: Normal.        Right lower leg: Normal.        Left lower leg: Normal. "   Neurological: She is alert and oriented to person, place, and time.   Skin: Skin is warm and dry. She is not diaphoretic.   Psychiatric: She has a normal mood and affect. Her behavior is normal. Judgment and thought content normal.   Nursing note and vitals reviewed.        Assessment:       1. Fall, initial encounter    2. Physical debility    3. Parkinson disease          Plan:   Fall, initial encounter    Physical debility    Parkinson disease      Follow up if symptoms worsen or fail to improve.    Radha appears to have had a fall remotely the cause some damage to her wall.  She suffered minor injury with pains and swelling quickly resolving and has no injury of the lower extremities on examination.  I advised her that with her physical debility and Parkinson's disease she certainly meets criteria for a home health referral to improve her strength, gait and balance but reviewing this with her she has no interest in this and would only like someone to work on her house.  I advised her that this is not what home health does and that she would need to find a  for this as her insurance does not cover home repairs.  She had no interest in home health/physical therapy at this time and advised me that her  would continue assisting her with dressing, bathing and meals and that she would continue doing exercises on her own at home.  She will alert me if she has any further falls, injuries or other problems before her routine follow-up with me in April and advised her she could call the office if she changes her mind about pursuing therapy.

## 2020-01-14 ENCOUNTER — TELEPHONE (OUTPATIENT)
Dept: FAMILY MEDICINE | Facility: CLINIC | Age: 80
End: 2020-01-14

## 2020-01-28 ENCOUNTER — TELEPHONE (OUTPATIENT)
Dept: FAMILY MEDICINE | Facility: CLINIC | Age: 80
End: 2020-01-28

## 2020-01-28 NOTE — TELEPHONE ENCOUNTER
----- Message from Fanny Gómez sent at 1/28/2020  1:39 PM CST -----  Type: Needs Medical Advice    Who Called:  Patient  Best Call Back Number: [phone]  Additional Information: Patient would like to speak with nurse concerning acquiring home health services/please call patient back to advise.

## 2020-01-29 ENCOUNTER — PATIENT MESSAGE (OUTPATIENT)
Dept: FAMILY MEDICINE | Facility: CLINIC | Age: 80
End: 2020-01-29

## 2020-01-30 ENCOUNTER — TELEPHONE (OUTPATIENT)
Dept: FAMILY MEDICINE | Facility: CLINIC | Age: 80
End: 2020-01-30

## 2020-01-30 NOTE — TELEPHONE ENCOUNTER
Patient is calling again to check the status of physical therapy referral. Patient advised her leg is getting weaker, the uninjured leg is getting painful for overcompensating.

## 2020-01-30 NOTE — TELEPHONE ENCOUNTER
----- Message from Princess SHERRY Fuller sent at 1/30/2020  2:50 PM CST -----  Contact: Patient  Type:  Patient Returning Call    Who Called:  Patient  Who Left Message for Patient:  Nurse [place]  Does the patient know what this is regarding?:  yes  Best Call Back Number:     Additional Information:

## 2020-01-31 NOTE — TELEPHONE ENCOUNTER
So far I have been unable to reach Jairo MATEUS to see if she still currently has services. Nurses and MA staff cannot order home health but if you need it pended to you for signature I can do that. Please advise.

## 2020-02-01 NOTE — TELEPHONE ENCOUNTER
"Try ordering it as "subsequent home health orders" if that doesn't work I will try to pend it for you Monday.   "

## 2020-02-03 NOTE — TELEPHONE ENCOUNTER
I tried that but the home health agency drop down box is blank and the order can not be signed without the agency identified.  I'm fairly certain the old order has to be canceled before we can place a new one.

## 2020-02-07 DIAGNOSIS — E11.3393 CONTROLLED TYPE 2 DIABETES MELLITUS WITH BOTH EYES AFFECTED BY MODERATE NONPROLIFERATIVE RETINOPATHY WITHOUT MACULAR EDEMA, WITH LONG-TERM CURRENT USE OF INSULIN: ICD-10-CM

## 2020-02-07 DIAGNOSIS — Z79.4 CONTROLLED TYPE 2 DIABETES MELLITUS WITH BOTH EYES AFFECTED BY MODERATE NONPROLIFERATIVE RETINOPATHY WITHOUT MACULAR EDEMA, WITH LONG-TERM CURRENT USE OF INSULIN: ICD-10-CM

## 2020-02-07 RX ORDER — GLIMEPIRIDE 4 MG/1
TABLET ORAL
Qty: 90 TABLET | Refills: 3 | OUTPATIENT
Start: 2020-02-07

## 2020-02-17 DIAGNOSIS — Z79.4 CONTROLLED TYPE 2 DIABETES MELLITUS WITH BOTH EYES AFFECTED BY MODERATE NONPROLIFERATIVE RETINOPATHY WITHOUT MACULAR EDEMA, WITH LONG-TERM CURRENT USE OF INSULIN: ICD-10-CM

## 2020-02-17 DIAGNOSIS — E11.3393 CONTROLLED TYPE 2 DIABETES MELLITUS WITH BOTH EYES AFFECTED BY MODERATE NONPROLIFERATIVE RETINOPATHY WITHOUT MACULAR EDEMA, WITH LONG-TERM CURRENT USE OF INSULIN: ICD-10-CM

## 2020-02-18 ENCOUNTER — TELEPHONE (OUTPATIENT)
Dept: FAMILY MEDICINE | Facility: CLINIC | Age: 80
End: 2020-02-18

## 2020-02-18 DIAGNOSIS — R53.81 PHYSICAL DEBILITY: Primary | ICD-10-CM

## 2020-02-18 RX ORDER — GLIMEPIRIDE 4 MG/1
TABLET ORAL
Qty: 90 TABLET | Refills: 3 | OUTPATIENT
Start: 2020-02-18

## 2020-02-18 NOTE — TELEPHONE ENCOUNTER
----- Message from Rosaura Angel sent at 2/18/2020 11:35 AM CST -----  Contact: self [phone]  Please call her with the status of her home health request from 2 weeks ago.  Thank you!

## 2020-02-19 ENCOUNTER — TELEPHONE (OUTPATIENT)
Dept: FAMILY MEDICINE | Facility: CLINIC | Age: 80
End: 2020-02-19

## 2020-02-19 NOTE — TELEPHONE ENCOUNTER
----- Message from Rosaura Angel sent at 2/19/2020 12:47 PM CST -----  Contact: Roshni puri/[facility] [phone]  The referral was sent to [facility] home health.  They could not accommodate her due to her insurance coverage.  She contacted [facility] home [facility], they can take her so she forwarded the referral to them.  Thank you!

## 2020-02-20 PROCEDURE — G0180 MD CERTIFICATION HHA PATIENT: HCPCS | Mod: ,,, | Performed by: FAMILY MEDICINE

## 2020-02-20 PROCEDURE — G0180 PR HOME HEALTH MD CERTIFICATION: ICD-10-PCS | Mod: ,,, | Performed by: FAMILY MEDICINE

## 2020-02-27 ENCOUNTER — EXTERNAL HOME HEALTH (OUTPATIENT)
Dept: HOME HEALTH SERVICES | Facility: HOSPITAL | Age: 80
End: 2020-02-27
Payer: MEDICARE

## 2020-04-20 ENCOUNTER — HOSPITAL ENCOUNTER (OUTPATIENT)
Facility: HOSPITAL | Age: 80
Discharge: HOME HEALTH | End: 2020-04-22
Attending: EMERGENCY MEDICINE | Admitting: INTERNAL MEDICINE
Payer: MEDICARE

## 2020-04-20 DIAGNOSIS — K21.9 GERD WITHOUT ESOPHAGITIS: ICD-10-CM

## 2020-04-20 DIAGNOSIS — K52.9 COLITIS: ICD-10-CM

## 2020-04-20 DIAGNOSIS — G20.A1 PARKINSON DISEASE: ICD-10-CM

## 2020-04-20 DIAGNOSIS — I48.20 CHRONIC ATRIAL FIBRILLATION: ICD-10-CM

## 2020-04-20 DIAGNOSIS — R19.7 ABDOMINAL PAIN, VOMITING, AND DIARRHEA: ICD-10-CM

## 2020-04-20 DIAGNOSIS — K52.9 COLITIS, ACUTE: Primary | ICD-10-CM

## 2020-04-20 DIAGNOSIS — R00.0 TACHYCARDIA: ICD-10-CM

## 2020-04-20 DIAGNOSIS — N39.0 URINARY TRACT INFECTION WITHOUT HEMATURIA, SITE UNSPECIFIED: ICD-10-CM

## 2020-04-20 DIAGNOSIS — R11.10 ABDOMINAL PAIN, VOMITING, AND DIARRHEA: ICD-10-CM

## 2020-04-20 DIAGNOSIS — R11.2 NON-INTRACTABLE VOMITING WITH NAUSEA, UNSPECIFIED VOMITING TYPE: ICD-10-CM

## 2020-04-20 DIAGNOSIS — E11.69 HYPERLIPIDEMIA ASSOCIATED WITH TYPE 2 DIABETES MELLITUS: ICD-10-CM

## 2020-04-20 DIAGNOSIS — N30.00 ACUTE CYSTITIS WITHOUT HEMATURIA: ICD-10-CM

## 2020-04-20 DIAGNOSIS — R07.9 CHEST PAIN: ICD-10-CM

## 2020-04-20 DIAGNOSIS — E78.5 HYPERLIPIDEMIA ASSOCIATED WITH TYPE 2 DIABETES MELLITUS: ICD-10-CM

## 2020-04-20 DIAGNOSIS — R10.9 ABDOMINAL PAIN, VOMITING, AND DIARRHEA: ICD-10-CM

## 2020-04-20 PROBLEM — K57.90 DIVERTICULOSIS: Chronic | Status: ACTIVE | Noted: 2020-04-20

## 2020-04-20 PROBLEM — N17.9 AKI (ACUTE KIDNEY INJURY): Status: ACTIVE | Noted: 2020-04-20

## 2020-04-20 LAB
ALBUMIN SERPL BCP-MCNC: 3.6 G/DL (ref 3.5–5.2)
ALP SERPL-CCNC: 75 U/L (ref 55–135)
ALT SERPL W/O P-5'-P-CCNC: 15 U/L (ref 10–44)
ANION GAP SERPL CALC-SCNC: 15 MMOL/L (ref 8–16)
APTT PPP: 24 SEC (ref 23.6–33.3)
AST SERPL-CCNC: 42 U/L (ref 10–40)
BACTERIA #/AREA URNS HPF: ABNORMAL /HPF
BASOPHILS # BLD AUTO: 0.02 K/UL (ref 0–0.2)
BASOPHILS NFR BLD: 0.2 % (ref 0–1.9)
BILIRUB SERPL-MCNC: 1.4 MG/DL (ref 0.1–1)
BILIRUB UR QL STRIP: NEGATIVE
BUN SERPL-MCNC: 16 MG/DL (ref 8–23)
CALCIUM SERPL-MCNC: 9.1 MG/DL (ref 8.7–10.5)
CHLORIDE SERPL-SCNC: 107 MMOL/L (ref 95–110)
CLARITY UR: ABNORMAL
CO2 SERPL-SCNC: 18 MMOL/L (ref 23–29)
COLOR UR: YELLOW
CREAT SERPL-MCNC: 1.2 MG/DL (ref 0.5–1.4)
DIFFERENTIAL METHOD: ABNORMAL
EOSINOPHIL # BLD AUTO: 0.1 K/UL (ref 0–0.5)
EOSINOPHIL NFR BLD: 1 % (ref 0–8)
ERYTHROCYTE [DISTWIDTH] IN BLOOD BY AUTOMATED COUNT: 12.7 % (ref 11.5–14.5)
EST. GFR  (AFRICAN AMERICAN): 49.3 ML/MIN/1.73 M^2
EST. GFR  (NON AFRICAN AMERICAN): 42.8 ML/MIN/1.73 M^2
GLUCOSE SERPL-MCNC: 146 MG/DL (ref 70–110)
GLUCOSE SERPL-MCNC: 182 MG/DL (ref 70–110)
GLUCOSE SERPL-MCNC: 212 MG/DL (ref 70–110)
GLUCOSE SERPL-MCNC: 227 MG/DL (ref 70–110)
GLUCOSE UR QL STRIP: NEGATIVE
HCT VFR BLD AUTO: 44.4 % (ref 37–48.5)
HGB BLD-MCNC: 14.2 G/DL (ref 12–16)
HGB UR QL STRIP: NEGATIVE
HYALINE CASTS #/AREA URNS LPF: 28 /LPF
IMM GRANULOCYTES # BLD AUTO: 0.04 K/UL (ref 0–0.04)
IMM GRANULOCYTES NFR BLD AUTO: 0.4 % (ref 0–0.5)
INR PPP: 1.7
KETONES UR QL STRIP: NEGATIVE
LEUKOCYTE ESTERASE UR QL STRIP: ABNORMAL
LIPASE SERPL-CCNC: 28 U/L (ref 4–60)
LYMPHOCYTES # BLD AUTO: 4.6 K/UL (ref 1–4.8)
LYMPHOCYTES NFR BLD: 47.2 % (ref 18–48)
MAGNESIUM SERPL-MCNC: 2.3 MG/DL (ref 1.6–2.6)
MCH RBC QN AUTO: 30.4 PG (ref 27–31)
MCHC RBC AUTO-ENTMCNC: 32 G/DL (ref 32–36)
MCV RBC AUTO: 95 FL (ref 82–98)
MICROSCOPIC COMMENT: ABNORMAL
MONOCYTES # BLD AUTO: 0.2 K/UL (ref 0.3–1)
MONOCYTES NFR BLD: 2.2 % (ref 4–15)
NEUTROPHILS # BLD AUTO: 4.7 K/UL (ref 1.8–7.7)
NEUTROPHILS NFR BLD: 49 % (ref 38–73)
NITRITE UR QL STRIP: POSITIVE
NRBC BLD-RTO: 0 /100 WBC
PH UR STRIP: 6 [PH] (ref 5–8)
PLATELET # BLD AUTO: 292 K/UL (ref 150–350)
PMV BLD AUTO: 10.9 FL (ref 9.2–12.9)
POTASSIUM SERPL-SCNC: 4.6 MMOL/L (ref 3.5–5.1)
PROT SERPL-MCNC: 7 G/DL (ref 6–8.4)
PROT UR QL STRIP: ABNORMAL
PROTHROMBIN TIME: 19.1 SEC (ref 10.6–14.8)
RBC # BLD AUTO: 4.67 M/UL (ref 4–5.4)
RBC #/AREA URNS HPF: 3 /HPF (ref 0–4)
SARS-COV-2 RDRP RESP QL NAA+PROBE: NEGATIVE
SODIUM SERPL-SCNC: 140 MMOL/L (ref 136–145)
SP GR UR STRIP: 1.01 (ref 1–1.03)
SQUAMOUS #/AREA URNS HPF: 0 /HPF
TSH SERPL DL<=0.005 MIU/L-ACNC: 2.35 UIU/ML (ref 0.34–5.6)
URN SPEC COLLECT METH UR: ABNORMAL
UROBILINOGEN UR STRIP-ACNC: NEGATIVE EU/DL
WBC # BLD AUTO: 9.67 K/UL (ref 3.9–12.7)
WBC #/AREA URNS HPF: 69 /HPF (ref 0–5)

## 2020-04-20 PROCEDURE — 80053 COMPREHEN METABOLIC PANEL: CPT

## 2020-04-20 PROCEDURE — 63600175 PHARM REV CODE 636 W HCPCS: Performed by: INTERNAL MEDICINE

## 2020-04-20 PROCEDURE — 87186 SC STD MICRODIL/AGAR DIL: CPT

## 2020-04-20 PROCEDURE — 96367 TX/PROPH/DG ADDL SEQ IV INF: CPT

## 2020-04-20 PROCEDURE — S0030 INJECTION, METRONIDAZOLE: HCPCS | Performed by: INTERNAL MEDICINE

## 2020-04-20 PROCEDURE — 83735 ASSAY OF MAGNESIUM: CPT

## 2020-04-20 PROCEDURE — 96361 HYDRATE IV INFUSION ADD-ON: CPT

## 2020-04-20 PROCEDURE — 87077 CULTURE AEROBIC IDENTIFY: CPT

## 2020-04-20 PROCEDURE — 25000003 PHARM REV CODE 250: Performed by: INTERNAL MEDICINE

## 2020-04-20 PROCEDURE — 82962 GLUCOSE BLOOD TEST: CPT

## 2020-04-20 PROCEDURE — 96365 THER/PROPH/DIAG IV INF INIT: CPT | Mod: 59

## 2020-04-20 PROCEDURE — 25000003 PHARM REV CODE 250: Performed by: EMERGENCY MEDICINE

## 2020-04-20 PROCEDURE — 85730 THROMBOPLASTIN TIME PARTIAL: CPT

## 2020-04-20 PROCEDURE — 96375 TX/PRO/DX INJ NEW DRUG ADDON: CPT

## 2020-04-20 PROCEDURE — 63600175 PHARM REV CODE 636 W HCPCS: Performed by: EMERGENCY MEDICINE

## 2020-04-20 PROCEDURE — 85025 COMPLETE CBC W/AUTO DIFF WBC: CPT

## 2020-04-20 PROCEDURE — U0002 COVID-19 LAB TEST NON-CDC: HCPCS

## 2020-04-20 PROCEDURE — 83690 ASSAY OF LIPASE: CPT

## 2020-04-20 PROCEDURE — 84443 ASSAY THYROID STIM HORMONE: CPT

## 2020-04-20 PROCEDURE — 85610 PROTHROMBIN TIME: CPT

## 2020-04-20 PROCEDURE — 81001 URINALYSIS AUTO W/SCOPE: CPT

## 2020-04-20 PROCEDURE — 87086 URINE CULTURE/COLONY COUNT: CPT

## 2020-04-20 PROCEDURE — G0378 HOSPITAL OBSERVATION PER HR: HCPCS

## 2020-04-20 PROCEDURE — 93005 ELECTROCARDIOGRAM TRACING: CPT | Performed by: INTERNAL MEDICINE

## 2020-04-20 PROCEDURE — C9113 INJ PANTOPRAZOLE SODIUM, VIA: HCPCS | Performed by: EMERGENCY MEDICINE

## 2020-04-20 PROCEDURE — 99285 EMERGENCY DEPT VISIT HI MDM: CPT | Mod: 25

## 2020-04-20 PROCEDURE — 25500020 PHARM REV CODE 255: Performed by: EMERGENCY MEDICINE

## 2020-04-20 RX ORDER — DILTIAZEM HYDROCHLORIDE 240 MG/1
240 CAPSULE, EXTENDED RELEASE ORAL DAILY
Status: DISCONTINUED | OUTPATIENT
Start: 2020-04-20 | End: 2020-04-22 | Stop reason: HOSPADM

## 2020-04-20 RX ORDER — LEVOFLOXACIN 5 MG/ML
250 INJECTION, SOLUTION INTRAVENOUS
Status: DISCONTINUED | OUTPATIENT
Start: 2020-04-21 | End: 2020-04-21

## 2020-04-20 RX ORDER — ONDANSETRON 2 MG/ML
4 INJECTION INTRAMUSCULAR; INTRAVENOUS EVERY 8 HOURS PRN
Status: DISCONTINUED | OUTPATIENT
Start: 2020-04-20 | End: 2020-04-22 | Stop reason: HOSPADM

## 2020-04-20 RX ORDER — SODIUM CHLORIDE, SODIUM LACTATE, POTASSIUM CHLORIDE, CALCIUM CHLORIDE 600; 310; 30; 20 MG/100ML; MG/100ML; MG/100ML; MG/100ML
INJECTION, SOLUTION INTRAVENOUS CONTINUOUS
Status: DISCONTINUED | OUTPATIENT
Start: 2020-04-20 | End: 2020-04-21

## 2020-04-20 RX ORDER — LEVOFLOXACIN 5 MG/ML
500 INJECTION, SOLUTION INTRAVENOUS
Status: DISCONTINUED | OUTPATIENT
Start: 2020-04-21 | End: 2020-04-20

## 2020-04-20 RX ORDER — SODIUM CHLORIDE 9 MG/ML
1000 INJECTION, SOLUTION INTRAVENOUS
Status: COMPLETED | OUTPATIENT
Start: 2020-04-20 | End: 2020-04-20

## 2020-04-20 RX ORDER — LEVOFLOXACIN 5 MG/ML
500 INJECTION, SOLUTION INTRAVENOUS
Status: COMPLETED | OUTPATIENT
Start: 2020-04-20 | End: 2020-04-20

## 2020-04-20 RX ORDER — DIPHENHYDRAMINE HCL 25 MG
25 CAPSULE ORAL ONCE
Status: COMPLETED | OUTPATIENT
Start: 2020-04-21 | End: 2020-04-20

## 2020-04-20 RX ORDER — ACETAMINOPHEN 325 MG/1
650 TABLET ORAL EVERY 4 HOURS PRN
Status: DISCONTINUED | OUTPATIENT
Start: 2020-04-20 | End: 2020-04-22 | Stop reason: HOSPADM

## 2020-04-20 RX ORDER — PANTOPRAZOLE SODIUM 40 MG/10ML
40 INJECTION, POWDER, LYOPHILIZED, FOR SOLUTION INTRAVENOUS
Status: COMPLETED | OUTPATIENT
Start: 2020-04-20 | End: 2020-04-20

## 2020-04-20 RX ORDER — ONDANSETRON 2 MG/ML
4 INJECTION INTRAMUSCULAR; INTRAVENOUS
Status: COMPLETED | OUTPATIENT
Start: 2020-04-20 | End: 2020-04-20

## 2020-04-20 RX ORDER — METRONIDAZOLE 500 MG/100ML
500 INJECTION, SOLUTION INTRAVENOUS
Status: COMPLETED | OUTPATIENT
Start: 2020-04-20 | End: 2020-04-20

## 2020-04-20 RX ORDER — ENOXAPARIN SODIUM 100 MG/ML
40 INJECTION SUBCUTANEOUS EVERY 24 HOURS
Status: DISCONTINUED | OUTPATIENT
Start: 2020-04-20 | End: 2020-04-20

## 2020-04-20 RX ORDER — ACETAMINOPHEN 325 MG/1
650 TABLET ORAL EVERY 8 HOURS PRN
Status: DISCONTINUED | OUTPATIENT
Start: 2020-04-20 | End: 2020-04-22 | Stop reason: HOSPADM

## 2020-04-20 RX ORDER — METRONIDAZOLE 500 MG/100ML
500 INJECTION, SOLUTION INTRAVENOUS
Status: DISCONTINUED | OUTPATIENT
Start: 2020-04-20 | End: 2020-04-22 | Stop reason: HOSPADM

## 2020-04-20 RX ADMIN — ACETAMINOPHEN 650 MG: 325 TABLET ORAL at 08:04

## 2020-04-20 RX ADMIN — METRONIDAZOLE 500 MG: 500 INJECTION, SOLUTION INTRAVENOUS at 08:04

## 2020-04-20 RX ADMIN — ONDANSETRON 4 MG: 2 INJECTION INTRAMUSCULAR; INTRAVENOUS at 08:04

## 2020-04-20 RX ADMIN — SODIUM CHLORIDE, SODIUM LACTATE, POTASSIUM CHLORIDE, AND CALCIUM CHLORIDE: .6; .31; .03; .02 INJECTION, SOLUTION INTRAVENOUS at 01:04

## 2020-04-20 RX ADMIN — IOHEXOL 100 ML: 350 INJECTION, SOLUTION INTRAVENOUS at 10:04

## 2020-04-20 RX ADMIN — SODIUM CHLORIDE 1000 ML: 0.9 INJECTION, SOLUTION INTRAVENOUS at 09:04

## 2020-04-20 RX ADMIN — METRONIDAZOLE 500 MG: 500 INJECTION, SOLUTION INTRAVENOUS at 12:04

## 2020-04-20 RX ADMIN — PANTOPRAZOLE SODIUM 40 MG: 40 INJECTION, POWDER, LYOPHILIZED, FOR SOLUTION INTRAVENOUS at 09:04

## 2020-04-20 RX ADMIN — SODIUM CHLORIDE, SODIUM LACTATE, POTASSIUM CHLORIDE, AND CALCIUM CHLORIDE: .6; .31; .03; .02 INJECTION, SOLUTION INTRAVENOUS at 11:04

## 2020-04-20 RX ADMIN — APIXABAN 5 MG: 5 TABLET, FILM COATED ORAL at 08:04

## 2020-04-20 RX ADMIN — DIPHENHYDRAMINE HYDROCHLORIDE 25 MG: 25 CAPSULE ORAL at 11:04

## 2020-04-20 RX ADMIN — LEVOFLOXACIN 500 MG: 500 INJECTION, SOLUTION INTRAVENOUS at 11:04

## 2020-04-20 NOTE — PROGRESS NOTES
Pharmacist Renal Dose Adjustment Note    Radha Alas is a 79 y.o. female being treated with the medication Levofloxacin    Patient Data:    Vital Signs (Most Recent):  Temp: 98 °F (36.7 °C) (04/20/20 1231)  Pulse: 108 (04/20/20 1231)  Resp: 19 (04/20/20 1231)  BP: (!) 143/65 (04/20/20 1231)  SpO2: 97 % (04/20/20 1231)   Vital Signs (72h Range):  Temp:  [97.7 °F (36.5 °C)-98.3 °F (36.8 °C)]   Pulse:  []   Resp:  [18-22]   BP: ()/(51-79)   SpO2:  [96 %-97 %]      Recent Labs   Lab 04/20/20  0902   CREATININE 1.2     Serum creatinine: 1.2 mg/dL 04/20/20 0902  Estimated creatinine clearance: 41.1 mL/min    Medication: Levofloxacin 500 mg IV dose: every 24 hours frequency will be changed to medication: Levofloxacin 250 mg IV dose: every 24 hours frequency.    Pharmacist's Name: Jaqui Butcher  Pharmacist's Extension: [phone]

## 2020-04-20 NOTE — HPI
79-year-old  female with multiple medical comorbidities including but not limited to chronic atrial fibrillation, bladder prolapse, history of endometrial cancer, peptic ulcer disease and Parkinson's disease presents to the ED with chief complaint of acute on chronic abdominal pain and vomiting.    Reports history of chronic abdominal pain.  However she started to notice acute worsening since this morning.  It is mostly over the right lower quadrant but radiates occasionally to involve bilateral lower quadrants.  It is cramping in nature.  Exacerbated by food intake.  No relieving factors.  Associated with 3 episodes of vomiting since this morning.  She does also endorse increased urinary frequency.  She denies dysuria or hematuria.  She denies fever, chills, chest pain or shortness of breath.  She does have chronic resting tremors of bilateral upper extremities secondary to Parkinson's disease.  She does use a walker at baseline to ambulate and admits to history of frequent falls.    In the ED:  Noted to be tachycardic and tachypneic.  Afebrile.  Labs with ARPIT and acidosis.  Urinalysis positive.  Rapid COVID screen negative.  CT abdomen with colitis.  Received IV fluids and levofloxacin-metronidazole.    Rest of the 10 point review of systems is negative except as mentioned above.

## 2020-04-20 NOTE — ED PROVIDER NOTES
Encounter Date: 4/20/2020       History     Chief Complaint   Patient presents with    Emesis    Flank Pain     79-year-old female who has a history of Parkinson's disease, diabetes, peptic ulcer disease, diverticulosis, hypertension, atrial fib, anemia, uterine cancer and prior DVT, presents emergency room from home with complaints of abdominal pain and vomiting.  The patient states that her abdominal pain is diffuse in nature.  She has no complaint of any flank pain.  She admits to being incontinent of urine but denies any dysuria.  No diarrhea.  No complaints of any chest pain or coughing.  She denies any shortness of breath.  No complaint of any headache.  She does have Parkinson's and a rest tremor.        Review of patient's allergies indicates:   Allergen Reactions    Glucophage  [metformin]      Other reaction(s): Diarrhea  Other reaction(s): Diarrhea    Sulfa (sulfonamide antibiotics) Itching     Other reaction(s): Itching  Other reaction(s): Itching     Past Medical History:   Diagnosis Date    Anemia     Atrial fibrillation     Chronic constipation     Deep vein thrombosis of left lower extremity     Diabetes mellitus     TYPE 2    Diverticulosis     Hypertension     Obesity     Parkinson disease     Peptic ulcer of stomach     Prolapse of female bladder, acquired     Rectocele     Uterine cancer 11-1-2011    STAGE 1-A GRADE 2     Past Surgical History:   Procedure Laterality Date    COLONOSCOPY  prior to 2000    COLONOSCOPY N/A 9/28/2016    Procedure: COLONOSCOPY;  Surgeon: Baljinder Ospina MD;  Location: [facility];  Service: Endoscopy;  Laterality: N/A;    FLEXIBLE SIGMOIDOSCOPY  06/05/2016    at [facility]- in media section: poor prep, diverticulosis noted with old clots, internal hemorrhoids otherwise normal findings- recommend outpatient colonoscopy    FOREARM SURGERY      right forearm surgery    HERNIA REPAIR      ROBOTIC ASST    HYSTERECTOMY  11/1/2001    [facility]/BSO--cancer     PELVIC AND PARA-AORTIC LYMPH NODE DISSECTION      MT REMOVAL OF OVARY/TUBE(S)      ROBOTIC ASST    TUBAL LIGATION      UPPER GASTROINTESTINAL ENDOSCOPY       Family History   Problem Relation Age of Onset    Heart disease Mother     Diabetes Mother     Hypertension Father     Heart disease Sister     Diabetes Maternal Grandmother     Hypertension Maternal Grandmother     No Known Problems Brother     No Known Problems Daughter     No Known Problems Son     No Known Problems Maternal Aunt     No Known Problems Maternal Uncle     No Known Problems Paternal Aunt     No Known Problems Paternal Uncle     No Known Problems Maternal Grandfather     No Known Problems Paternal Grandmother     No Known Problems Paternal Grandfather     Breast cancer Neg Hx     Ovarian cancer Neg Hx     Uterine cancer Neg Hx     Colon cancer Neg Hx     Cancer Neg Hx     Cervical cancer Neg Hx     Endometrial cancer Neg Hx     Vaginal cancer Neg Hx     Colon polyps Neg Hx     Crohn's disease Neg Hx     Ulcerative colitis Neg Hx      Social History     Tobacco Use    Smoking status: Never Smoker    Smokeless tobacco: Never Used   Substance Use Topics    Alcohol use: No     Alcohol/week: 0.0 standard drinks     Frequency: Never     Binge frequency: Never    Drug use: No     Review of Systems   Constitutional: Positive for appetite change. Negative for chills, diaphoresis and fever.   HENT: Negative for congestion, ear pain, rhinorrhea, sinus pressure, sore throat and trouble swallowing.    Eyes: Negative for redness.   Respiratory: Negative for cough, chest tightness, shortness of breath, wheezing and stridor.    Cardiovascular: Positive for palpitations. Negative for chest pain and leg swelling.   Gastrointestinal: Positive for abdominal pain, nausea and vomiting. Negative for constipation and diarrhea.   Genitourinary: Negative for dysuria, flank pain and hematuria.        Incontinent   Musculoskeletal:  Negative for back pain.   Skin: Negative for pallor and rash.   Neurological: Positive for tremors. Negative for dizziness, syncope, weakness, light-headedness and headaches.   Hematological: Does not bruise/bleed easily.   All other systems reviewed and are negative.      Physical Exam     Initial Vitals [04/20/20 0804]   BP Pulse Resp Temp SpO2   119/65 (!) 129 (!) 22 98.3 °F (36.8 °C) 97 %      MAP       --         Physical Exam    Constitutional: She appears well-developed and well-nourished. She is not diaphoretic. No distress.   Rest tremor   HENT:   Head: Normocephalic and atraumatic.   Right Ear: External ear normal.   Left Ear: External ear normal.   Nose: Nose normal.   Mouth/Throat: Oropharynx is clear and moist. No oropharyngeal exudate.   Eyes: Conjunctivae are normal. Pupils are equal, round, and reactive to light. Right eye exhibits no discharge. Left eye exhibits no discharge.   Neck: Normal range of motion. Neck supple. No JVD present.   Cardiovascular: Normal heart sounds and intact distal pulses. Exam reveals no gallop and no friction rub.    No murmur heard.  Tachycardic irregular irregular rhythm   Pulmonary/Chest: Breath sounds normal. No respiratory distress. She has no wheezes. She has no rhonchi. She has no rales.   Abdominal: Soft. Bowel sounds are normal. She exhibits no distension. There is no tenderness. There is no rebound and no guarding.   Brown emesis   Musculoskeletal: Normal range of motion. She exhibits no edema or tenderness.   Lymphadenopathy:     She has no cervical adenopathy.   Neurological: She is alert and oriented to person, place, and time. She has normal strength. No cranial nerve deficit or sensory deficit. GCS score is 15. GCS eye subscore is 4. GCS verbal subscore is 5. GCS motor subscore is 6.   Skin: Skin is warm and dry. Capillary refill takes less than 2 seconds. No rash noted. No erythema. No pallor.         ED Course   Procedures  Labs Reviewed - No data to  display       Imaging Results    None                       Attending Attestation:             Attending ED Notes:   This 79-year-old female presented with complaints of abdominal pain and vomiting, had a CT scan showing evidence of colitis.  Labs showed a negative COVID screen.  Urinalysis showed evidence of infection with many bacteria in 69 white cells per high-power field on a catheterized specimen.  Chemistries only remarkable for blood sugar of 212 and a CO2 of 18.  The patient did receive IV hydration with saline.  Lipase is normal.  Patient will be started on Flagyl and Levaquin.  Hospital Medicine is consult for admission.                        Clinical Impression:       ICD-10-CM ICD-9-CM   1. Non-intractable vomiting with nausea, unspecified vomiting type R11.2 787.01   2. Tachycardia R00.0 785.0   3. Abdominal pain, vomiting, and diarrhea R10.9 789.00    R11.10 787.03    R19.7 787.91   4. Colitis K52.9 558.9   5. Urinary tract infection without hematuria, site unspecified N39.0 599.0                                Tab Centeno Jr., MD  04/20/20 1110

## 2020-04-20 NOTE — H&P
[facility]  History & Physical    Patient Name: Radha Alas  MRN: [id number]  Admission Date: 4/20/2020  Attending Physician: Christophe Guerra MD  Primary Care Provider: Rudi Tamez DO         Patient information was obtained from patient, past medical records and ER records.     Subjective:     Principal Problem:Colitis, acute    Chief Complaint:   Chief Complaint   Patient presents with    Emesis    Flank Pain        HPI: 79-year-old  female with multiple medical comorbidities including but not limited to chronic atrial fibrillation, bladder prolapse, history of endometrial cancer, peptic ulcer disease and Parkinson's disease presents to the ED with chief complaint of acute on chronic abdominal pain and vomiting.    Reports history of chronic abdominal pain.  However she started to notice acute worsening since this morning.  It is mostly over the right lower quadrant but radiates occasionally to involve bilateral lower quadrants.  It is cramping in nature.  Exacerbated by food intake.  No relieving factors.  Associated with 3 episodes of vomiting since this morning.  She does also endorse increased urinary frequency.  She denies dysuria or hematuria.  She denies fever, chills, chest pain or shortness of breath.  She does have chronic resting tremors of bilateral upper extremities secondary to Parkinson's disease.  She does use a walker at baseline to ambulate and admits to history of frequent falls.    In the ED:  Noted to be tachycardic and tachypneic.  Afebrile.  Labs with ARPIT and acidosis.  Urinalysis positive.  Rapid COVID screen negative.  CT abdomen with colitis.  Received IV fluids and levofloxacin-metronidazole.    Rest of the 10 point review of systems is negative except as mentioned above.    Past Medical History:   Diagnosis Date    Anemia     Atrial fibrillation     Chronic constipation     Deep vein thrombosis of left lower extremity      Diabetes mellitus     TYPE 2    Diverticulosis     Hypertension     Obesity     Parkinson disease     Peptic ulcer of stomach     Prolapse of female bladder, acquired     Rectocele     Uterine cancer 11-1-2011    STAGE 1-A GRADE 2       Past Surgical History:   Procedure Laterality Date    COLONOSCOPY  prior to 2000    COLONOSCOPY N/A 9/28/2016    Procedure: COLONOSCOPY;  Surgeon: Baljinder Ospina MD;  Location: [facility];  Service: Endoscopy;  Laterality: N/A;    FLEXIBLE SIGMOIDOSCOPY  06/05/2016    at [facility]- in media section: poor prep, diverticulosis noted with old clots, internal hemorrhoids otherwise normal findings- recommend outpatient colonoscopy    FOREARM SURGERY      right forearm surgery    HERNIA REPAIR      ROBOTIC ASST    HYSTERECTOMY  11/1/2001    [facility]/BSO--cancer    PELVIC AND PARA-AORTIC LYMPH NODE DISSECTION      IA REMOVAL OF OVARY/TUBE(S)      ROBOTIC ASST    TUBAL LIGATION      UPPER GASTROINTESTINAL ENDOSCOPY         Review of patient's allergies indicates:   Allergen Reactions    Glucophage  [metformin]      Other reaction(s): Diarrhea  Other reaction(s): Diarrhea    Sulfa (sulfonamide antibiotics) Itching     Other reaction(s): Itching  Other reaction(s): Itching       No current facility-administered medications on file prior to encounter.      Current Outpatient Medications on File Prior to Encounter   Medication Sig    ACETAMINOPHEN (TYLENOL ARTHRITIS ORAL) Take by mouth.    CARTIA  mg Cp24 Take one capsule every evening    DILT- mg CDCR Take 240 mg by mouth every morning.     ELIQUIS 5 mg Tab TK 1 T PO BID    glimepiride (AMARYL) 4 MG tablet TAKE 1 TABLET(4 MG) BY MOUTH DAILY WITH BREAKFAST    insulin detemir U-100 (LEVEMIR FLEXTOUCH U-100 INSULN) 100 unit/mL (3 mL) SubQ InPn pen INJECT 20 UNITS UNDER THE SKIN EVERY NIGHT AT BEDTIME    lisinopril (PRINIVIL,ZESTRIL) 20 MG tablet 1 TABLET ONCE DAILY    ranitidine (ZANTAC) 150 MG tablet Take 1 tablet  "(150 mg total) by mouth nightly.    pen needle, diabetic (BD ULTRA-FINE MINI PEN NEEDLE) 31 gauge x 3/16" Ndle USE DAILY TO ADMINISTER INSULIN     Family History     Problem Relation (Age of Onset)    Diabetes Mother, Maternal Grandmother    Heart disease Mother, Sister    Hypertension Father, Maternal Grandmother    No Known Problems Brother, Daughter, Son, Maternal Aunt, Maternal Uncle, Paternal Aunt, Paternal Uncle, Maternal Grandfather, Paternal Grandmother, Paternal Grandfather        Tobacco Use    Smoking status: Never Smoker    Smokeless tobacco: Never Used   Substance and Sexual Activity    Alcohol use: No     Alcohol/week: 0.0 standard drinks     Frequency: Never     Binge frequency: Never    Drug use: No    Sexual activity: Not Currently       Objective:     Vital Signs (Most Recent):  Temp: 97.7 °F (36.5 °C) (04/20/20 1130)  Pulse: 100 (04/20/20 1130)  Resp: 19 (04/20/20 1130)  BP: (!) 158/79 (04/20/20 1130)  SpO2: 97 % (04/20/20 1130) Vital Signs (24h Range):  Temp:  [97.7 °F (36.5 °C)-98.3 °F (36.8 °C)] 97.7 °F (36.5 °C)  Pulse:  [] 100  Resp:  [18-22] 19  SpO2:  [96 %-97 %] 97 %  BP: ()/(51-79) 158/79     Weight: 81.6 kg (180 lb)  Body mass index is 28.19 kg/m².    Physical Exam   Constitutional: She is oriented to person, place, and time. She appears ill. She appears distressed.   HENT:   Head: Normocephalic and atraumatic.   Eyes: Pupils are equal, round, and reactive to light. Conjunctivae are normal. No scleral icterus.   Neck: Neck supple. No thyromegaly present.   Cardiovascular: Normal heart sounds. An irregularly irregular rhythm present. Tachycardia present.   No murmur heard.  Pulmonary/Chest: Effort normal and breath sounds normal. No respiratory distress.   Abdominal: Soft. Bowel sounds are normal. She exhibits no distension. There is tenderness (RLQ). There is no CVA tenderness.   Musculoskeletal: She exhibits no edema or deformity.   Neurological: She is alert and " oriented to person, place, and time. She has normal strength. She displays tremor. No sensory deficit.   Skin: Skin is warm and dry. Capillary refill takes less than 2 seconds. No erythema.   Psychiatric: Her behavior is normal. Thought content normal.   Flat affect    Nursing note and vitals reviewed.        CRANIAL NERVES     CN III, IV, VI   Pupils are equal, round, and reactive to light.       Significant Labs:   CBC:   Recent Labs   Lab 04/20/20  0815   WBC 9.67   HGB 14.2   HCT 44.4        CMP:   Recent Labs   Lab 04/20/20  0902      K 4.6      CO2 18*   *   BUN 16   CREATININE 1.2   CALCIUM 9.1   PROT 7.0   ALBUMIN 3.6   BILITOT 1.4*   ALKPHOS 75   AST 42*   ALT 15   ANIONGAP 15   EGFRNONAA 42.8*     Lipase:   Recent Labs   Lab 04/20/20  0902   LIPASE 28     Magnesium:   Recent Labs   Lab 04/20/20  0902   MG 2.3       Significant Imaging: I have reviewed all pertinent imaging results/findings within the past 24 hours.     Imaging Results          CT Abdomen Pelvis With Contrast (Final result)  Result time 04/20/20 11:04:22    Final result by Lit Ulloa MD (04/20/20 11:04:22)                 Impression:      1. There is mild pericolonic fat stranding of the distal transverse colon and splenic flexure which may reflect a mild colitis, infectious versus inflammatory.  Additionally there is moderate stool content in the descending and sigmoid colon with more proximal fluid dilated large bowel as described, possibly reflecting a mechanical obstruction.  2. Descending and sigmoid colon diverticulosis.      Electronically signed by: Lit Ulloa MD  Date:    04/20/2020  Time:    11:04             Narrative:      CMS MANDATED QUALITY DATA - CT RADIATION - 436    All CT scans at this facility utilize dose modulation, iterative reconstruction, and/or weight based dosing when appropriate to reduce radiation dose to as low as reasonably achievable.    EXAMINATION:  CT ABDOMEN PELVIS WITH  CONTRAST    CLINICAL HISTORY:  Nausea, vomiting, diarrhea;    TECHNIQUE:  CT abdomen and pelvis with 100 mL Omnipaque 350.    COMPARISON:  None    FINDINGS:  Bibasilar linear opacities are noted, worst in the left lung base.  The heart is normal size.    The liver is normal size with no hepatic lesions identified.  The gallbladder is mildly distended.  No radiopaque gallstones identified.  The common bile duct is within normal limits.  There is mild dilatation of the pancreatic duct measuring up to 5 mm.  No pancreatic lesions identified.  The spleen and adrenal glands are unremarkable.    The kidneys are normal size.  No hydronephrosis or nephrolithiasis.  No renal lesions demonstrated.  The ureters are normal caliber.  The uterus and adnexal structures are not identified.  No free fluid in the pelvic cul-de-sac.    There is diverticulosis of the descending and sigmoid colon.  There is moderate stool content in the descending colon and sigmoid bowel with proximal mild fluid dilatation of the transverse colon, ascending colon and cecum.  There is mild pericolonic fat stranding at the distal transverse colon and splenic flexure.  The appendix is not identified.  The small bowel is normal caliber.  The stomach is grossly unremarkable.    The abdominal aorta is normal caliber.  No intra-abdominal lymphadenopathy.  No free fluid identified.  Anterior abdominal wall is within normal limits.  Degenerative changes of the bilateral hips and spine noted.  Levoscoliosis of the thoracolumbar spine noted.  No acute osseous abnormality.                                  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 Mueller, on eliquis      Parkinson disease [G20]  Yes     Following with Dr. Flanagan.      Chronic constipation [K59.09]  Yes    History of endometrial cancer [Z85.42]  Not Applicable      Resolved Hospital Problems   No resolved problems to display.     Plan:  Admit to med-surg with telemetry   Abd pain likely due to colitis; no evidence of pyelonephritis   UTI noted; follow urine culture   Gentle IV fluids due to ARPIT; hold home lisinopril   Empiric metronidazole and levofloxacin - will cover for colitis and UTI   GI consult   Clear liquids  Home meds for chronic medical conditions      VTE Risk Mitigation (From admission, onward)         Ordered     enoxaparin injection 40 mg  Daily      04/20/20 1139     IP VTE HIGH RISK PATIENT  Once      04/20/20 1139                   Christophe Guerra MD  Department of Hospital Medicine   [facility]

## 2020-04-20 NOTE — SUBJECTIVE & OBJECTIVE
Past Medical History:   Diagnosis Date    Anemia     Atrial fibrillation     Chronic constipation     Deep vein thrombosis of left lower extremity     Diabetes mellitus     TYPE 2    Diverticulosis     Hypertension     Obesity     Parkinson disease     Peptic ulcer of stomach     Prolapse of female bladder, acquired     Rectocele     Uterine cancer 11-1-2011    STAGE 1-A GRADE 2       Past Surgical History:   Procedure Laterality Date    COLONOSCOPY  prior to 2000    COLONOSCOPY N/A 9/28/2016    Procedure: COLONOSCOPY;  Surgeon: Baljinder Ospina MD;  Location: [facility];  Service: Endoscopy;  Laterality: N/A;    FLEXIBLE SIGMOIDOSCOPY  06/05/2016    at [facility]- in media section: poor prep, diverticulosis noted with old clots, internal hemorrhoids otherwise normal findings- recommend outpatient colonoscopy    FOREARM SURGERY      right forearm surgery    HERNIA REPAIR      ROBOTIC ASST    HYSTERECTOMY  11/1/2001    [facility]--cancer    PELVIC AND PARA-AORTIC LYMPH NODE DISSECTION      [place] REMOVAL OF OVARY/TUBE(S)      ROBOTIC ASST    TUBAL LIGATION      UPPER GASTROINTESTINAL ENDOSCOPY         Review of patient's allergies indicates:   Allergen Reactions    Glucophage  [metformin]      Other reaction(s): Diarrhea  Other reaction(s): Diarrhea    Sulfa (sulfonamide antibiotics) Itching     Other reaction(s): Itching  Other reaction(s): Itching       No current facility-administered medications on file prior to encounter.      Current Outpatient Medications on File Prior to Encounter   Medication Sig    ACETAMINOPHEN (TYLENOL ARTHRITIS ORAL) Take by mouth.    CARTIA  mg Cp24 Take one capsule every evening    DILT- mg CDCR Take 240 mg by mouth every morning.     ELIQUIS 5 mg Tab TK 1 T PO BID    glimepiride (AMARYL) 4 MG tablet TAKE 1 TABLET(4 MG) BY MOUTH DAILY WITH BREAKFAST    insulin detemir U-100 (LEVEMIR FLEXTOUCH U-100 INSULN) 100 unit/mL (3 mL) SubQ InPn pen INJECT 20 UNITS  "UNDER THE SKIN EVERY NIGHT AT BEDTIME    lisinopril (PRINIVIL,ZESTRIL) 20 MG tablet 1 TABLET ONCE DAILY    ranitidine (ZANTAC) 150 MG tablet Take 1 tablet (150 mg total) by mouth nightly.    pen needle, diabetic (BD ULTRA-FINE MINI PEN NEEDLE) 31 gauge x 3/16" Ndle USE DAILY TO ADMINISTER INSULIN     Family History     Problem Relation (Age of Onset)    Diabetes Mother, Maternal Grandmother    Heart disease Mother, Sister    Hypertension Father, Maternal Grandmother    No Known Problems Brother, Daughter, Son, Maternal Aunt, Maternal Uncle, Paternal Aunt, Paternal Uncle, Maternal Grandfather, Paternal Grandmother, Paternal Grandfather        Tobacco Use    Smoking status: Never Smoker    Smokeless tobacco: Never Used   Substance and Sexual Activity    Alcohol use: No     Alcohol/week: 0.0 standard drinks     Frequency: Never     Binge frequency: Never    Drug use: No    Sexual activity: Not Currently       Objective:     Vital Signs (Most Recent):  Temp: 97.7 °F (36.5 °C) (04/20/20 1130)  Pulse: 100 (04/20/20 1130)  Resp: 19 (04/20/20 1130)  BP: (!) 158/79 (04/20/20 1130)  SpO2: 97 % (04/20/20 1130) Vital Signs (24h Range):  Temp:  [97.7 °F (36.5 °C)-98.3 °F (36.8 °C)] 97.7 °F (36.5 °C)  Pulse:  [] 100  Resp:  [18-22] 19  SpO2:  [96 %-97 %] 97 %  BP: ()/(51-79) 158/79     Weight: 81.6 kg (180 lb)  Body mass index is 28.19 kg/m².    Physical Exam   Constitutional: She is oriented to person, place, and time. She appears ill. She appears distressed.   HENT:   Head: Normocephalic and atraumatic.   Eyes: Pupils are equal, round, and reactive to light. Conjunctivae are normal. No scleral icterus.   Neck: Neck supple. No thyromegaly present.   Cardiovascular: Normal heart sounds. An irregularly irregular rhythm present. Tachycardia present.   No murmur heard.  Pulmonary/Chest: Effort normal and breath sounds normal. No respiratory distress.   Abdominal: Soft. Bowel sounds are normal. She exhibits no " distension. There is tenderness (RLQ). There is no CVA tenderness.   Musculoskeletal: She exhibits no edema or deformity.   Neurological: She is alert and oriented to person, place, and time. She has normal strength. She displays tremor. No sensory deficit.   Skin: Skin is warm and dry. Capillary refill takes less than 2 seconds. No erythema.   Psychiatric: Her behavior is normal. Thought content normal.   Flat affect    Nursing note and vitals reviewed.        CRANIAL NERVES     CN III, IV, VI   Pupils are equal, round, and reactive to light.       Significant Labs:   CBC:   Recent Labs   Lab 04/20/20  0815   WBC 9.67   HGB 14.2   HCT 44.4        CMP:   Recent Labs   Lab 04/20/20  0902      K 4.6      CO2 18*   *   BUN 16   CREATININE 1.2   CALCIUM 9.1   PROT 7.0   ALBUMIN 3.6   BILITOT 1.4*   ALKPHOS 75   AST 42*   ALT 15   ANIONGAP 15   EGFRNONAA 42.8*     Lipase:   Recent Labs   Lab 04/20/20  0902   LIPASE 28     Magnesium:   Recent Labs   Lab 04/20/20  0902   MG 2.3       Significant Imaging: I have reviewed all pertinent imaging results/findings within the past 24 hours.     Imaging Results          CT Abdomen Pelvis With Contrast (Final result)  Result time 04/20/20 11:04:22    Final result by Lit Ulloa MD (04/20/20 11:04:22)                 Impression:      1. There is mild pericolonic fat stranding of the distal transverse colon and splenic flexure which may reflect a mild colitis, infectious versus inflammatory.  Additionally there is moderate stool content in the descending and sigmoid colon with more proximal fluid dilated large bowel as described, possibly reflecting a mechanical obstruction.  2. Descending and sigmoid colon diverticulosis.      Electronically signed by: Lit Ulloa MD  Date:    04/20/2020  Time:    11:04             Narrative:      CMS MANDATED QUALITY DATA - CT RADIATION - 436    All CT scans at this facility utilize dose modulation, iterative  reconstruction, and/or weight based dosing when appropriate to reduce radiation dose to as low as reasonably achievable.    EXAMINATION:  CT ABDOMEN PELVIS WITH CONTRAST    CLINICAL HISTORY:  Nausea, vomiting, diarrhea;    TECHNIQUE:  CT abdomen and pelvis with 100 mL Omnipaque 350.    COMPARISON:  None    FINDINGS:  Bibasilar linear opacities are noted, worst in the left lung base.  The heart is normal size.    The liver is normal size with no hepatic lesions identified.  The gallbladder is mildly distended.  No radiopaque gallstones identified.  The common bile duct is within normal limits.  There is mild dilatation of the pancreatic duct measuring up to 5 mm.  No pancreatic lesions identified.  The spleen and adrenal glands are unremarkable.    The kidneys are normal size.  No hydronephrosis or nephrolithiasis.  No renal lesions demonstrated.  The ureters are normal caliber.  The uterus and adnexal structures are not identified.  No free fluid in the pelvic cul-de-sac.    There is diverticulosis of the descending and sigmoid colon.  There is moderate stool content in the descending colon and sigmoid bowel with proximal mild fluid dilatation of the transverse colon, ascending colon and cecum.  There is mild pericolonic fat stranding at the distal transverse colon and splenic flexure.  The appendix is not identified.  The small bowel is normal caliber.  The stomach is grossly unremarkable.    The abdominal aorta is normal caliber.  No intra-abdominal lymphadenopathy.  No free fluid identified.  Anterior abdominal wall is within normal limits.  Degenerative changes of the bilateral hips and spine noted.  Levoscoliosis of the thoracolumbar spine noted.  No acute osseous abnormality.

## 2020-04-21 PROBLEM — N17.9 AKI (ACUTE KIDNEY INJURY): Status: RESOLVED | Noted: 2020-04-20 | Resolved: 2020-04-21

## 2020-04-21 LAB
ANION GAP SERPL CALC-SCNC: 6 MMOL/L (ref 8–16)
BASOPHILS # BLD AUTO: 0.02 K/UL (ref 0–0.2)
BASOPHILS NFR BLD: 0.2 % (ref 0–1.9)
BUN SERPL-MCNC: 26 MG/DL (ref 8–23)
CALCIUM SERPL-MCNC: 8.1 MG/DL (ref 8.7–10.5)
CHLORIDE SERPL-SCNC: 110 MMOL/L (ref 95–110)
CO2 SERPL-SCNC: 24 MMOL/L (ref 23–29)
CREAT SERPL-MCNC: 0.9 MG/DL (ref 0.5–1.4)
DIFFERENTIAL METHOD: ABNORMAL
EOSINOPHIL # BLD AUTO: 0 K/UL (ref 0–0.5)
EOSINOPHIL NFR BLD: 0.2 % (ref 0–8)
ERYTHROCYTE [DISTWIDTH] IN BLOOD BY AUTOMATED COUNT: 12.9 % (ref 11.5–14.5)
EST. GFR  (AFRICAN AMERICAN): >60 ML/MIN/1.73 M^2
EST. GFR  (NON AFRICAN AMERICAN): >60 ML/MIN/1.73 M^2
GLUCOSE SERPL-MCNC: 102 MG/DL (ref 70–110)
GLUCOSE SERPL-MCNC: 115 MG/DL (ref 70–110)
GLUCOSE SERPL-MCNC: 117 MG/DL (ref 70–110)
GLUCOSE SERPL-MCNC: 117 MG/DL (ref 70–110)
GLUCOSE SERPL-MCNC: 87 MG/DL (ref 70–110)
HCT VFR BLD AUTO: 38 % (ref 37–48.5)
HGB BLD-MCNC: 12.3 G/DL (ref 12–16)
IMM GRANULOCYTES # BLD AUTO: 0.03 K/UL (ref 0–0.04)
IMM GRANULOCYTES NFR BLD AUTO: 0.2 % (ref 0–0.5)
LYMPHOCYTES # BLD AUTO: 2 K/UL (ref 1–4.8)
LYMPHOCYTES NFR BLD: 15.7 % (ref 18–48)
MAGNESIUM SERPL-MCNC: 1.9 MG/DL (ref 1.6–2.6)
MCH RBC QN AUTO: 30.3 PG (ref 27–31)
MCHC RBC AUTO-ENTMCNC: 32.4 G/DL (ref 32–36)
MCV RBC AUTO: 94 FL (ref 82–98)
MONOCYTES # BLD AUTO: 0.8 K/UL (ref 0.3–1)
MONOCYTES NFR BLD: 6 % (ref 4–15)
NEUTROPHILS # BLD AUTO: 9.9 K/UL (ref 1.8–7.7)
NEUTROPHILS NFR BLD: 77.7 % (ref 38–73)
NRBC BLD-RTO: 0 /100 WBC
PLATELET # BLD AUTO: 223 K/UL (ref 150–350)
PMV BLD AUTO: 10.2 FL (ref 9.2–12.9)
POTASSIUM SERPL-SCNC: 4 MMOL/L (ref 3.5–5.1)
RBC # BLD AUTO: 4.06 M/UL (ref 4–5.4)
SODIUM SERPL-SCNC: 140 MMOL/L (ref 136–145)
WBC # BLD AUTO: 12.77 K/UL (ref 3.9–12.7)

## 2020-04-21 PROCEDURE — 97530 THERAPEUTIC ACTIVITIES: CPT

## 2020-04-21 PROCEDURE — 97166 OT EVAL MOD COMPLEX 45 MIN: CPT

## 2020-04-21 PROCEDURE — 80048 BASIC METABOLIC PNL TOTAL CA: CPT

## 2020-04-21 PROCEDURE — 83735 ASSAY OF MAGNESIUM: CPT

## 2020-04-21 PROCEDURE — G0378 HOSPITAL OBSERVATION PER HR: HCPCS

## 2020-04-21 PROCEDURE — 36415 COLL VENOUS BLD VENIPUNCTURE: CPT

## 2020-04-21 PROCEDURE — 63700000 PHARM REV CODE 250 ALT 637 W/O HCPCS: Performed by: INTERNAL MEDICINE

## 2020-04-21 PROCEDURE — S0030 INJECTION, METRONIDAZOLE: HCPCS | Performed by: INTERNAL MEDICINE

## 2020-04-21 PROCEDURE — 97535 SELF CARE MNGMENT TRAINING: CPT | Mod: 59

## 2020-04-21 PROCEDURE — 96376 TX/PRO/DX INJ SAME DRUG ADON: CPT

## 2020-04-21 PROCEDURE — 63600175 PHARM REV CODE 636 W HCPCS: Performed by: INTERNAL MEDICINE

## 2020-04-21 PROCEDURE — 97162 PT EVAL MOD COMPLEX 30 MIN: CPT

## 2020-04-21 PROCEDURE — 25000003 PHARM REV CODE 250: Performed by: INTERNAL MEDICINE

## 2020-04-21 PROCEDURE — 85025 COMPLETE CBC W/AUTO DIFF WBC: CPT

## 2020-04-21 RX ORDER — DIPHENHYDRAMINE HCL 25 MG
25 CAPSULE ORAL EVERY 6 HOURS PRN
Status: DISCONTINUED | OUTPATIENT
Start: 2020-04-21 | End: 2020-04-22 | Stop reason: HOSPADM

## 2020-04-21 RX ORDER — HYDROCODONE BITARTRATE AND ACETAMINOPHEN 5; 325 MG/1; MG/1
1 TABLET ORAL EVERY 6 HOURS PRN
Status: DISCONTINUED | OUTPATIENT
Start: 2020-04-21 | End: 2020-04-22 | Stop reason: HOSPADM

## 2020-04-21 RX ORDER — LEVOFLOXACIN 5 MG/ML
500 INJECTION, SOLUTION INTRAVENOUS
Status: DISCONTINUED | OUTPATIENT
Start: 2020-04-21 | End: 2020-04-22 | Stop reason: HOSPADM

## 2020-04-21 RX ADMIN — METRONIDAZOLE 500 MG: 500 INJECTION, SOLUTION INTRAVENOUS at 09:04

## 2020-04-21 RX ADMIN — APIXABAN 5 MG: 5 TABLET, FILM COATED ORAL at 08:04

## 2020-04-21 RX ADMIN — METRONIDAZOLE 500 MG: 500 INJECTION, SOLUTION INTRAVENOUS at 04:04

## 2020-04-21 RX ADMIN — APIXABAN 5 MG: 5 TABLET, FILM COATED ORAL at 09:04

## 2020-04-21 RX ADMIN — DILTIAZEM HYDROCHLORIDE 240 MG: 240 CAPSULE, EXTENDED RELEASE ORAL at 08:04

## 2020-04-21 RX ADMIN — ACETAMINOPHEN 650 MG: 325 TABLET ORAL at 12:04

## 2020-04-21 RX ADMIN — LEVOFLOXACIN 500 MG: 500 INJECTION, SOLUTION INTRAVENOUS at 11:04

## 2020-04-21 RX ADMIN — METRONIDAZOLE 500 MG: 500 INJECTION, SOLUTION INTRAVENOUS at 12:04

## 2020-04-21 NOTE — PT/OT/SLP EVAL
Physical Therapy Evaluation    Patient Name:  Radha Alas   MRN:  [id number]    Recommendations:     Discharge Recommendations:  home with home health, home health PT   Discharge Equipment Recommendations: none   Barriers to discharge: None    Assessment:     Radha Alas is a 79 y.o. female admitted with a medical diagnosis of Colitis, acute.  Pt has PMH to include prolapsed bladder following hysterectomy. She presents with the following impairments/functional limitations:  weakness, impaired endurance, impaired functional mobilty, gait instability, decreased coordination, impaired balance, abnormal tone . Pt has rigidity, tremors, and decreased coordination due to Parkinson's but did  ambulate short distance with RW and Mod A  [place] [facility] had flexed trunk posture Pt reported decline in her baseline level of function. Recommend D/C home with HH PT and  HH aide.    Rehab Prognosis: Fair; patient would benefit from acute skilled PT services to address these deficits and reach maximum level of function.    Recent Surgery: * No surgery found *      Plan:     During this hospitalization, patient to be seen   to address the identified rehab impairments via gait training, therapeutic activities, therapeutic exercises and progress toward the following goals:    · Plan of Care Expires:  05/21/20    Subjective     Chief Complaint:   wants to know cause of nausea  Patient/Family Comments/goals: home  Pain/Comfort:  ·      Patients cultural, spiritual, Jehovah's witness conflicts given the current situation:      Living Environment:  Pt lives with her  in a one story house w/o entry steps  Prior to admission, patients level of function was Min A .  Equipment used at home: walker, rolling.  DME owned (not currently used): wheelchair.  Upon discharge, patient will have assistance from her .    Objective:     Communicated with nurse prior to session.  Patient found supine with blood pressure cuff, bed alarm,  telemetry  upon PT entry to room.    General Precautions: Standard, vision impaired, fall   Orthopedic Precautions:    Braces:       Exams:  · Cognitive Exam:  Patient is oriented to Person, Place and Situation  · RLE ROM: WFL  · RLE Strength: WFL  · LLE ROM: WFL  · LLE Strength: WFL    Functional Mobility:  · Bed Mobility:     · Supine to Sit: moderate assistance  · Sit to Supine: moderate assistance  · Transfers:     · Sit to Stand:  minimum assistance, moderate assistance and pt t/f'ed sit to stand from EOB with Min A but from BSC with Mod A with rolling walker  · Gait: 6 ft RW and Min A at very slow pace  · Balance: poor sitting balance with posterior lean, poor standing balance      Therapeutic Activities and Exercises:  Bed mobility, t/f training  to BSC with Mod A , short distance ambulation  RW and Min A     AM-PAC 6 CLICK MOBILITY  Total Score:11     Patient left supine with call button in reach and bed alarm on.    GOALS:   Multidisciplinary Problems     Physical Therapy Goals        Problem: Physical Therapy Goal    Goal Priority Disciplines Outcome Goal Variances Interventions   Physical Therapy Goal     PT, PT/OT      Description:  Goals to be met by: D/C    Patient will increase functional independence with mobility by performin. Supine to sit with MInimal Assistance  2. Sit to stand transfer with Minimal Assistance  3. Bed to chair transfer with Minimal Assistance using Rolling Walker  4. Gait  x 50 feet with Contact Guard Assistance using Rolling Walker.                       History:     Past Medical History:   Diagnosis Date    Anemia     Atrial fibrillation     Chronic constipation     Deep vein thrombosis of left lower extremity     Diabetes mellitus     TYPE 2    Diverticulosis     Hypertension     Obesity     Parkinson disease     Peptic ulcer of stomach     Prolapse of female bladder, acquired     Rectocele     Uterine cancer 2011    STAGE 1-A GRADE 2       Past  Surgical History:   Procedure Laterality Date    COLONOSCOPY  prior to 2000    COLONOSCOPY N/A 9/28/2016    Procedure: COLONOSCOPY;  Surgeon: Baljinder Ospina MD;  Location: [facility];  Service: Endoscopy;  Laterality: N/A;    FLEXIBLE SIGMOIDOSCOPY  06/05/2016    at [facility]- in media section: poor prep, diverticulosis noted with old clots, internal hemorrhoids otherwise normal findings- recommend outpatient colonoscopy    FOREARM SURGERY      right forearm surgery    HERNIA REPAIR      ROBOTIC ASST    HYSTERECTOMY  11/1/2001    [facility]/BSO--cancer    PELVIC AND PARA-AORTIC LYMPH NODE DISSECTION      [place] REMOVAL OF OVARY/TUBE(S)      ROBOTIC ASST    TUBAL LIGATION      UPPER GASTROINTESTINAL ENDOSCOPY         Time Tracking:     PT Received On: 04/21/20  PT Start Time: 1050     PT Stop Time: 1116  PT Total Time (min): 26 min     Billable Minutes: Evaluation 15 minutes and Therapeutic Activity 9 minutes      Yarelis Griggs, PT  04/21/2020

## 2020-04-21 NOTE — PROGRESS NOTES
[facility] Medicine  Progress Note    Patient name: Radha Alas  MRN: [id number]  Admit Date: 4/20/2020   LOS: 0 days     SUBJECTIVE:     Principal problem: Colitis, acute    Interval History:  No acute overnight events reported.  Patient reports improvement in abdominal pain but still has residual intermittent LLQ pain.  She is also endorsing left hip pain.  Nausea is better with no reported emesis overnight.  She is willing to try something more solid for lunch.    Scheduled Meds:   apixaban  5 mg Oral BID    diltiaZEM  240 mg Oral Daily    insulin detemir U-100  10 Units Subcutaneous QHS    levoFLOXacin  500 mg Intravenous Q24H    metronidazole  500 mg Intravenous Q8H     Continuous Infusions:  PRN Meds:acetaminophen, acetaminophen, dextrose 50%, dextrose 50%, diphenhydrAMINE, HYDROcodone-acetaminophen, insulin regular, ondansetron    Review of patient's allergies indicates:   Allergen Reactions    Glucophage  [metformin]      Other reaction(s): Diarrhea  Other reaction(s): Diarrhea    Sulfa (sulfonamide antibiotics) Itching     Other reaction(s): Itching  Other reaction(s): Itching       Review of Systems: As per interval history    OBJECTIVE:     Vital Signs (Most Recent)  Temp: 98 °F (36.7 °C) (04/21/20 0720)  Pulse: 98 (04/21/20 0720)  Resp: 18 (04/21/20 0720)  BP: 119/77 (04/21/20 0720)  SpO2: 98 % (04/21/20 0720)    Vital Signs Range (Last 24H):  Temp:  [97.3 °F (36.3 °C)-98.2 °F (36.8 °C)]   Pulse:  []   Resp:  [18-20]   BP: ()/(62-77)   SpO2:  [95 %-99 %]     I & O (Last 24H):    Intake/Output Summary (Last 24 hours) at 4/21/2020 1208  Last data filed at 4/21/2020 0600  Gross per 24 hour   Intake 1850 ml   Output --   Net 1850 ml       Physical Exam:  General: Patient resting comfortably in no acute distress. Appears as stated age. Calm  Eyes: No conjunctival injection. No scleral icterus.  ENT: Hearing grossly intact. No discharge from ears. No nasal discharge.    Neck: Supple, trachea midline. No JVD  CVS:  Irregularly irregular. No LE edema BL  Lungs: CTA BL, no wheezing or crackles. Good breath sounds. No accessory muscle use. No acute respiratory distress  Abdomen:  Soft, LLQ tenderness, nondistended.  No organomegaly  Neuro: AOx3. Moves all extremities. Slow to respond, resting tremors of b/l upper extremities   Skin:  No rash or erythema noted    Laboratory:  CBC:   Recent Labs   Lab 04/21/20  0432   WBC 12.77*   RBC 4.06   HGB 12.3   HCT 38.0      MCV 94   MCH 30.3   MCHC 32.4     CMP:   Recent Labs   Lab 04/20/20  0902 04/21/20  0431   * 117*   CALCIUM 9.1 8.1*   ALBUMIN 3.6  --    PROT 7.0  --     140   K 4.6 4.0   CO2 18* 24    110   BUN 16 26*   CREATININE 1.2 0.9   ALKPHOS 75  --    ALT 15  --    AST 42*  --    BILITOT 1.4*  --      Microbiology Results (last 7 days)     Procedure Component Value Units Date/Time    Urine culture [[id number]]  (Abnormal) Collected:  04/20/20 0840    Order Status:  Completed Specimen:  Urine Updated:  04/21/20 0705     Urine Culture, Routine GRAM NEGATIVE ANGELY, LACTOSE   >100,000 cfu/ml  Identification and susceptibility pending      Narrative:       Preferred Collection Type->Urine, Catheterized  Specimen Source->Urine          ASSESSMENT/PLAN:       Active Hospital Problems    Diagnosis  POA    *Colitis, acute [K52.9]  Yes    Acute cystitis without hematuria [N30.00]  Yes    Diverticulosis [K57.90]  Yes     Chronic    Hyperlipidemia associated with type 2 diabetes mellitus [E11.69, E78.5]  Yes    GERD without esophagitis [K21.9]  Yes    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 Mueller, on eliquis      Parkinson disease [G20]  Yes     Following with Dr. Flanagan.       Chronic constipation [K59.09]  Yes    History of endometrial cancer [Z85.42]  Not Applicable      Resolved Hospital Problems    Diagnosis Date Resolved POA    ARPIT (acute kidney injury) [N17.9] 04/21/2020 Yes     Priority: 3          Plan:   Appreciate GI input   D/c IV fluids  Advance diet as tolerated   Urine cx - prelim - Gram negative bacteria - follow until final   Continue levofloxacin and metronidazole - to cover for colitis and UTI   PT/OT consulted   Home meds for chronic medical conditions     Dispo: Anticipate discharge in the next 24 hrs     VTE Risk Mitigation (From admission, onward)         Ordered     apixaban tablet 5 mg  2 times daily      04/20/20 1224     IP VTE HIGH RISK PATIENT  Once      04/20/20 1139                  Department Hospital Medicine  formerly [facility]  Christophe Guerra MD  Date of service: 04/21/2020

## 2020-04-21 NOTE — CONSULTS
GASTROENTEROLOGY INPATIENT CONSULT NOTE  Patient Name: Radha Alas  Patient MRN: [id number]  Patient : 1940    Admit Date: 2020  Service date: 2020    Reason for Consult: colitis    PCP: Rudi Tamez DO    Chief Complaint   Patient presents with    Emesis    Flank Pain       HPI: Patient is a 79 y.o. female with PMHx HTN, Afib / DVT (Eliquis), DM, parkinson's, uterine Ca presents for abdominal pain. Acute onset, constant, progressive but now resolving. Symptoms started to mid left side 2 days ago. Unsure of any f/c at home. Feels better since admission with antibiotics. No bleeding.     CHART REVIEW:   WBC 13; Normal CMP  CT Abd  - splenic flexure colitis w/ mild stool distally; Mild GB distension; Normal PD; Borderline PD dilation w/ otherwise normal pancreas  Colon ' - rectal prolapse; internal roids; small polpys; multiple tics; Normal TI    Past Medical History:  Past Medical History:   Diagnosis Date    Anemia     Atrial fibrillation     Chronic constipation     Deep vein thrombosis of left lower extremity     Diabetes mellitus     TYPE 2    Diverticulosis     Hypertension     Obesity     Parkinson disease     Peptic ulcer of stomach     Prolapse of female bladder, acquired     Rectocele     Uterine cancer 2011    STAGE 1-A GRADE 2        Past Surgical History:  Past Surgical History:   Procedure Laterality Date    COLONOSCOPY  prior to     COLONOSCOPY N/A 2016    Procedure: COLONOSCOPY;  Surgeon: Baljinder Ospina MD;  Location: [facility];  Service: Endoscopy;  Laterality: N/A;    FLEXIBLE SIGMOIDOSCOPY  2016    at [facility]- in media section: poor prep, diverticulosis noted with old clots, internal hemorrhoids otherwise normal findings- recommend outpatient colonoscopy    FOREARM SURGERY      right forearm surgery    HERNIA REPAIR      ROBOTIC ASST    HYSTERECTOMY  2001    [facility]/BSO--cancer    PELVIC AND PARA-AORTIC LYMPH NODE DISSECTION       "PA REMOVAL OF OVARY/TUBE(S)      ROBOTIC ASST    TUBAL LIGATION      UPPER GASTROINTESTINAL ENDOSCOPY          Home Medications:  Medications Prior to Admission   Medication Sig Dispense Refill Last Dose    ACETAMINOPHEN (TYLENOL ARTHRITIS ORAL) Take by mouth.   4/19/2020    CARTIA  mg Cp24 Take one capsule every evening  3 4/19/2020    DILT- mg CDCR Take 240 mg by mouth every morning.   3 4/19/2020    ELIQUIS 5 mg Tab TK 1 T PO BID  2 4/19/2020    glimepiride (AMARYL) 4 MG tablet TAKE 1 TABLET(4 MG) BY MOUTH DAILY WITH BREAKFAST 90 tablet 3 4/19/2020    insulin detemir U-100 (LEVEMIR FLEXTOUCH U-100 INSULN) 100 unit/mL (3 mL) SubQ InPn pen INJECT 20 UNITS UNDER THE SKIN EVERY NIGHT AT BEDTIME 18 mL 3 4/19/2020    lisinopril (PRINIVIL,ZESTRIL) 20 MG tablet 1 TABLET ONCE DAILY 90 tablet 3 4/19/2020    ranitidine (ZANTAC) 150 MG tablet Take 1 tablet (150 mg total) by mouth nightly. 90 tablet 3 4/19/2020    pen needle, diabetic (BD ULTRA-FINE MINI PEN NEEDLE) 31 gauge x 3/16" Ndle USE DAILY TO ADMINISTER INSULIN 300 each 3 Taking       Inpatient Medications:   apixaban  5 mg Oral BID    diltiaZEM  240 mg Oral Daily    insulin detemir U-100  10 Units Subcutaneous QHS    levoFLOXacin  500 mg Intravenous Q24H    metronidazole  500 mg Intravenous Q8H     acetaminophen, acetaminophen, dextrose 50%, dextrose 50%, insulin regular, ondansetron    Review of patient's allergies indicates:   Allergen Reactions    Glucophage  [metformin]      Other reaction(s): Diarrhea  Other reaction(s): Diarrhea    Sulfa (sulfonamide antibiotics) Itching     Other reaction(s): Itching  Other reaction(s): Itching       Social History:   Social History     Occupational History    Not on file   Tobacco Use    Smoking status: Never Smoker    Smokeless tobacco: Never Used   Substance and Sexual Activity    Alcohol use: No     Alcohol/week: 0.0 standard drinks     Frequency: Never     Binge frequency: Never    Drug " "use: No    Sexual activity: Not Currently       Family History:   Family History   Problem Relation Age of Onset    Heart disease Mother     Diabetes Mother     Hypertension Father     Heart disease Sister     Diabetes Maternal Grandmother     Hypertension Maternal Grandmother     No Known Problems Brother     No Known Problems Daughter     No Known Problems Son     No Known Problems Maternal Aunt     No Known Problems Maternal Uncle     No Known Problems Paternal Aunt     No Known Problems Paternal Uncle     No Known Problems Maternal Grandfather     No Known Problems Paternal Grandmother     No Known Problems Paternal Grandfather     Breast cancer Neg Hx     Ovarian cancer Neg Hx     Uterine cancer Neg Hx     Colon cancer Neg Hx     Cancer Neg Hx     Cervical cancer Neg Hx     Endometrial cancer Neg Hx     Vaginal cancer Neg Hx     Colon polyps Neg Hx     Crohn's disease Neg Hx     Ulcerative colitis Neg Hx        Review of Systems:  A 10 point review of systems was performed and was normal, except as mentioned in the HPI, including constitutional, HEENT, heme, lymph, cardiovascular, respiratory, gastrointestinal, genitourinary, neurologic, endocrine, psychiatric and musculoskeletal.      OBJECTIVE:    Physical Exam:  24 Hour Vital Sign Ranges: Temp:  [97.3 °F (36.3 °C)-98.2 °F (36.8 °C)] 98 °F (36.7 °C)  Pulse:  [] 98  Resp:  [18-20] 18  SpO2:  [95 %-99 %] 98 %  BP: ()/(55-79) 119/77  Most recent vitals: /77 (Patient Position: Lying)   Pulse 98   Temp 98 °F (36.7 °C) (Oral)   Resp 18   Ht 5' 7" (1.702 m)   Wt 84.2 kg (185 lb 10 oz)   SpO2 98%   Breastfeeding? No   BMI 29.07 kg/m²    GEN: well-developed, well-nourished, awake and alert, non-toxic appearing adult  HEENT: PERRL, sclera anicteric, oral mucosa pink and moist without lesion  NECK: trachea midline; Good ROM  CV: regular rate and rhythm, no murmurs or gallops  RESP: clear to auscultation bilaterally, " no wheezes, rhonci or rales  ABD: soft, mild-tender, non-distended, normal bowel sounds  EXT: no swelling or edema, 2+ pulses distally  SKIN: no rashes or jaundice  PSYCH: normal affect    Labs:   Recent Labs     04/20/20  0815 04/21/20  0432   WBC 9.67 12.77*   MCV 95 94    223     Recent Labs     04/20/20  0902 04/21/20  0431    140   K 4.6 4.0    110   CO2 18* 24   BUN 16 26*   * 117*     No results for input(s): ALB in the last 72 hours.    Invalid input(s): ALKP, SGOT, SGPT, TBIL, DBIL, TPRO  Recent Labs     04/20/20  0902   INR 1.7         Radiology Review:  CT Abdomen Pelvis With Contrast   Final Result      1. There is mild pericolonic fat stranding of the distal transverse colon and splenic flexure which may reflect a mild colitis, infectious versus inflammatory.  Additionally there is moderate stool content in the descending and sigmoid colon with more proximal fluid dilated large bowel as described, possibly reflecting a mechanical obstruction.   2. Descending and sigmoid colon diverticulosis.         Electronically signed by: Lit Ulloa MD   Date:    04/20/2020   Time:    11:04            IMPRESSION / RECOMMENDATIONS:  79 y.o. female with PMHx HTN, Afib / DVT (), DM, parkinson's, uterine Ca presents for abdominal pain w/ focal colitis near splenic flexure. Concern for infectious vs focal ischemic given pain. Malignancy / IBD less likely given relatively unremarkable colon in '16.     -Continue brief course of antibiotics  -F/u clinic 2-3 weeks and may consider repeat colonoscopy in 2-3 months as discussed w/ patient    Thank you for this consult.    Jair Kumari III  4/21/2020  9:03 AM

## 2020-04-21 NOTE — PROGRESS NOTES
Pharmacist Renal Dose Adjustment Note    Radha Alas is a 79 y.o. female being treated with the medication Levofloxacin    Patient Data:    Vital Signs (Most Recent):  Temp: 98.2 °F (36.8 °C) (04/21/20 0340)  Pulse: 94 (04/21/20 0340)  Resp: 18 (04/21/20 0340)  BP: 123/74 (04/21/20 0340)  SpO2: 97 % (04/21/20 0340) Vital Signs (72h Range):  Temp:  [97.3 °F (36.3 °C)-98.3 °F (36.8 °C)]   Pulse:  []   Resp:  [18-22]   BP: ()/(51-79)   SpO2:  [95 %-99 %]      Recent Labs   Lab 04/20/20  0902 04/21/20  0431   CREATININE 1.2 0.9     Serum creatinine: 0.9 mg/dL 04/21/20 0431  Estimated creatinine clearance: 55.6 mL/min    Medication: Levofloxacin 250 mg IV dose: every 24 hours frequency will be changed to medication: Levofloxacin 500 mg IV dose: every 24 hours frequency.    Pharmacist's Name: Jaqui Butcher  Pharmacist's Extension: [phone]

## 2020-04-21 NOTE — PLAN OF CARE
Problem: Fall Injury Risk  Goal: Absence of Fall and Fall-Related Injury  Outcome: Ongoing, Progressing     Problem: Adult Inpatient Plan of Care  Goal: Plan of Care Review  Outcome: Ongoing, Progressing  Goal: Patient-Specific Goal (Individualization)  Outcome: Ongoing, Progressing  Goal: Absence of Hospital-Acquired Illness or Injury  Outcome: Ongoing, Progressing  Goal: Optimal Comfort and Wellbeing  Outcome: Ongoing, Progressing  Goal: Readiness for Transition of Care  Outcome: Ongoing, Progressing  Goal: Rounds/Family Conference  Outcome: Ongoing, Progressing     Problem: Diabetes Comorbidity  Goal: Blood Glucose Level Within Desired Range  Outcome: Ongoing, Progressing     Problem: Electrolyte Imbalance (Acute Kidney Injury/Impairment)  Goal: Serum Electrolyte Balance  Outcome: Ongoing, Progressing     Problem: Fluid Imbalance (Acute Kidney Injury/Impairment)  Goal: Optimal Fluid Balance  Outcome: Ongoing, Progressing     Problem: Hematologic Alteration (Acute Kidney Injury/Impairment)  Goal: Hemoglobin, Hematocrit and Platelets Within Normal Range  Outcome: Ongoing, Progressing     Problem: Oral Intake Inadequate (Acute Kidney Injury/Impairment)  Goal: Optimal Nutrition Intake  Outcome: Ongoing, Progressing     Problem: Renal Function Impairment (Acute Kidney Injury/Impairment)  Goal: Effective Renal Function  Outcome: Ongoing, Progressing     Problem: Skin Injury Risk Increased  Goal: Skin Health and Integrity  Outcome: Ongoing, Progressing

## 2020-04-21 NOTE — PLAN OF CARE
04/21/20 1829   MACHUCA Message   Medicare Outpatient and Observation Notification regarding financial responsibility Given to patient/caregiver;Explained to patient/caregiver;Other (comments)  (Due to COVID-19 precautions and Pt's AMS, Pt's  contacted at [phone]).)   Date MACHUCA was signed 04/21/20   Time MACHUCA was signed 1829     Copy left at bedside.

## 2020-04-21 NOTE — PT/OT/SLP EVAL
"Occupational Therapy   Evaluation    Name: Radha Alas  MRN: [id number]  Admitting Diagnosis:  Colitis, acute      Recommendations:     Discharge Recommendations: home health OT  Discharge Equipment Recommendations:  (TBD)  Barriers to discharge:  None    Assessment:     Radha Alas is a 79 y.o. female with a medical diagnosis of Colitis, acute.  Pt agreeable to OT eval this AM. Performance deficits affecting function: weakness, impaired endurance, impaired self care skills, impaired functional mobilty, decreased coordination, decreased upper extremity function, abnormal tone, impaired coordination, impaired fine motor, decreased ROM.  Pt oriented to person, place, and month (not to year). Pt reports PTA, pt was requiring mod-max A with ADLs/functional mobility, but prior to that (~ 1 month ago), pt was min A with ADLs/functional mobility. Rec home with HHOT at d/c.      Rehab Prognosis: Fair; patient would benefit from acute skilled OT services to address these deficits and reach maximum level of function.       Plan:     Patient to be seen 5 x/week to address the above listed problems via self-care/home management, therapeutic activities, therapeutic exercises  · Plan of Care Expires: 05/21/20  · Plan of Care Reviewed with: patient    Subjective     Chief Complaint: Not feeling well  Patient/Family Comments/goals: to get better    Occupational Profile:  Living Environment: Pt lives with  and grandson in a 1 story home with no steps to enter. Pt has a tub/shower combo and a standard height toilet.   Previous level of function: PTA, Mod-Max A with ADLs/functional mobility. Pt reports she was unable to feed self a few days PTA. Pt reports she wears a diaper at home. Pt reports having a "lift device" to assist her into/out of her tub. Pt received HHPT previously this year. Pt reports ~1 month prior, pt was at min A with ADLs/functional mobility   Roles and Routines: Sedentary; does not drive  Equipment Used at " "Home:  bedside commode, walker, rolling, wheelchair(Pt reports she has a "lift device" that helps her get into her tub)  Assistance upon Discharge: yes, from family     Pain/Comfort:  · Pain Rating 1: 0/10    Patients cultural, spiritual, Methodist conflicts given the current situation:      Objective:     Communicated with: nursing prior to session.  Patient found HOB elevated with bed alarm, telemetry upon OT entry to room.    General Precautions: Standard, fall, vision impaired   Orthopedic Precautions:N/A   Braces: N/A     Occupational Performance:    Activities of Daily Living:  · Feeding:  minimum assistance to assist in holding cup of water and to bring the cup to patient's mouth for patient to take a sip  · Grooming: setup assistance bed level to wash face      Cognitive/Visual Perceptual:  Cognitive/Psychosocial Skills:     -       Oriented to: Person, Place, Time and not to year   -       Follows Commands/attention:Follows one-step commands  -       Communication: clear/fluent  -       Mood/Affect/Coping skills/emotional control: Appropriate to situation, Cooperative, Pleasant and Withdrawn  Visual/Perceptual:      -impaired vision 2/2 diabetic retinopathy       Physical Exam:  Upper Extremity Range of Motion:     -       Right Upper Extremity: ~90 degrees shoulder flex; WFL distally  -       Left Upper Extremity: ~70 degrees shoulder flex; WFL distally  Upper Extremity Strength:    -       Right Upper Extremity: 3-/5 shoulder flex; WFL distally  -       Left Upper Extremity: 2+/5 shoulder flex; WFL distally   Strength:    -       Right Upper Extremity: poor   -       Left Upper Extremity: poor   Fine/Gross Motor Coordination:    -       Impaired bilaterally 2/2 tremors/rigidity 2/2 parkinson's    AMPAC 6 Click ADL:  AMPAC Total Score: 11    Treatment & Education:  Pt educated on role of OT/POC and educated on feeling around on call bell for the raised dots on the nurse button (as patient " verbalized she was unable to see the call bell clearly to push the correct button). Pt demonstrated correct use of call bell by feeling around the bell for the raised dots on the nurse button.   Education:    Patient left HOB elevated with all lines intact, call button in reach and bed alarm on    GOALS:   Multidisciplinary Problems     Occupational Therapy Goals        Problem: Occupational Therapy Goal    Goal Priority Disciplines Outcome Interventions   Occupational Therapy Goal     OT, PT/OT     Description:  Goals to be met by: discharge     Patient will increase functional independence with ADLs by performing:    Feeding with Minimal Assistance.  UE Dressing with Minimal Assistance.  LE Dressing with Minimal Assistance.  Grooming while seated with Supervision.  Toileting from bedside commode with Minimal Assistance for hygiene and clothing management.   Toilet transfer to bedside commode with Minimal Assistance.                      History:     Past Medical History:   Diagnosis Date    Anemia     Atrial fibrillation     Chronic constipation     Deep vein thrombosis of left lower extremity     Diabetes mellitus     TYPE 2    Diverticulosis     Hypertension     Obesity     Parkinson disease     Peptic ulcer of stomach     Prolapse of female bladder, acquired     Rectocele     Uterine cancer 11-1-2011    STAGE 1-A GRADE 2       Past Surgical History:   Procedure Laterality Date    COLONOSCOPY  prior to 2000    COLONOSCOPY N/A 9/28/2016    Procedure: COLONOSCOPY;  Surgeon: Baljinder Ospina MD;  Location: [facility];  Service: Endoscopy;  Laterality: N/A;    FLEXIBLE SIGMOIDOSCOPY  06/05/2016    at [facility]- in media section: poor prep, diverticulosis noted with old clots, internal hemorrhoids otherwise normal findings- recommend outpatient colonoscopy    FOREARM SURGERY      right forearm surgery    HERNIA REPAIR      ROBOTIC ASST    HYSTERECTOMY  11/1/2001    [facility]/BSO--cancer    PELVIC AND  PARA-AORTIC LYMPH NODE DISSECTION      [place] REMOVAL OF OVARY/TUBE(S)      ROBOTIC ASST    TUBAL LIGATION      UPPER GASTROINTESTINAL ENDOSCOPY         Time Tracking:     OT Date of Treatment: 04/21/20  OT Start Time: 1126  OT Stop Time: 1150  OT Total Time (min): 24 min    Billable Minutes:Evaluation 10  Self Care/Home Management 14    Tasia Maloney OT  4/21/2020

## 2020-04-22 VITALS
HEART RATE: 82 BPM | TEMPERATURE: 98 F | DIASTOLIC BLOOD PRESSURE: 78 MMHG | BODY MASS INDEX: 29.13 KG/M2 | RESPIRATION RATE: 16 BRPM | HEIGHT: 67 IN | OXYGEN SATURATION: 94 % | WEIGHT: 185.63 LBS | SYSTOLIC BLOOD PRESSURE: 135 MMHG

## 2020-04-22 PROBLEM — N30.00 ACUTE CYSTITIS WITHOUT HEMATURIA: Status: RESOLVED | Noted: 2020-04-20 | Resolved: 2020-04-22

## 2020-04-22 LAB
ANION GAP SERPL CALC-SCNC: 7 MMOL/L (ref 8–16)
BACTERIA UR CULT: ABNORMAL
BASOPHILS # BLD AUTO: 0.03 K/UL (ref 0–0.2)
BASOPHILS NFR BLD: 0.3 % (ref 0–1.9)
BUN SERPL-MCNC: 16 MG/DL (ref 8–23)
CALCIUM SERPL-MCNC: 8.1 MG/DL (ref 8.7–10.5)
CHLORIDE SERPL-SCNC: 107 MMOL/L (ref 95–110)
CO2 SERPL-SCNC: 24 MMOL/L (ref 23–29)
CREAT SERPL-MCNC: 0.8 MG/DL (ref 0.5–1.4)
DIFFERENTIAL METHOD: ABNORMAL
EOSINOPHIL # BLD AUTO: 0 K/UL (ref 0–0.5)
EOSINOPHIL NFR BLD: 0.3 % (ref 0–8)
ERYTHROCYTE [DISTWIDTH] IN BLOOD BY AUTOMATED COUNT: 13.1 % (ref 11.5–14.5)
EST. GFR  (AFRICAN AMERICAN): >60 ML/MIN/1.73 M^2
EST. GFR  (NON AFRICAN AMERICAN): >60 ML/MIN/1.73 M^2
GLUCOSE SERPL-MCNC: 90 MG/DL (ref 70–110)
GLUCOSE SERPL-MCNC: 95 MG/DL (ref 70–110)
GLUCOSE SERPL-MCNC: 96 MG/DL (ref 70–110)
HCT VFR BLD AUTO: 35.7 % (ref 37–48.5)
HGB BLD-MCNC: 11.7 G/DL (ref 12–16)
IMM GRANULOCYTES # BLD AUTO: 0.03 K/UL (ref 0–0.04)
IMM GRANULOCYTES NFR BLD AUTO: 0.3 % (ref 0–0.5)
LYMPHOCYTES # BLD AUTO: 1.7 K/UL (ref 1–4.8)
LYMPHOCYTES NFR BLD: 15.2 % (ref 18–48)
MAGNESIUM SERPL-MCNC: 1.9 MG/DL (ref 1.6–2.6)
MCH RBC QN AUTO: 30.9 PG (ref 27–31)
MCHC RBC AUTO-ENTMCNC: 32.8 G/DL (ref 32–36)
MCV RBC AUTO: 94 FL (ref 82–98)
MONOCYTES # BLD AUTO: 0.7 K/UL (ref 0.3–1)
MONOCYTES NFR BLD: 6.3 % (ref 4–15)
NEUTROPHILS # BLD AUTO: 8.8 K/UL (ref 1.8–7.7)
NEUTROPHILS NFR BLD: 77.6 % (ref 38–73)
NRBC BLD-RTO: 0 /100 WBC
PLATELET # BLD AUTO: 223 K/UL (ref 150–350)
PMV BLD AUTO: 10.2 FL (ref 9.2–12.9)
POTASSIUM SERPL-SCNC: 3.7 MMOL/L (ref 3.5–5.1)
RBC # BLD AUTO: 3.79 M/UL (ref 4–5.4)
SODIUM SERPL-SCNC: 138 MMOL/L (ref 136–145)
WBC # BLD AUTO: 11.35 K/UL (ref 3.9–12.7)

## 2020-04-22 PROCEDURE — 97530 THERAPEUTIC ACTIVITIES: CPT | Mod: CQ

## 2020-04-22 PROCEDURE — 36415 COLL VENOUS BLD VENIPUNCTURE: CPT

## 2020-04-22 PROCEDURE — 80048 BASIC METABOLIC PNL TOTAL CA: CPT

## 2020-04-22 PROCEDURE — 85025 COMPLETE CBC W/AUTO DIFF WBC: CPT

## 2020-04-22 PROCEDURE — 63600175 PHARM REV CODE 636 W HCPCS: Performed by: INTERNAL MEDICINE

## 2020-04-22 PROCEDURE — 63700000 PHARM REV CODE 250 ALT 637 W/O HCPCS: Performed by: INTERNAL MEDICINE

## 2020-04-22 PROCEDURE — S0030 INJECTION, METRONIDAZOLE: HCPCS | Performed by: INTERNAL MEDICINE

## 2020-04-22 PROCEDURE — 25000003 PHARM REV CODE 250: Performed by: INTERNAL MEDICINE

## 2020-04-22 PROCEDURE — G0378 HOSPITAL OBSERVATION PER HR: HCPCS

## 2020-04-22 PROCEDURE — 83735 ASSAY OF MAGNESIUM: CPT

## 2020-04-22 PROCEDURE — 96376 TX/PRO/DX INJ SAME DRUG ADON: CPT

## 2020-04-22 RX ORDER — METRONIDAZOLE 500 MG/1
500 TABLET ORAL 3 TIMES DAILY
Qty: 15 TABLET | Refills: 0 | Status: SHIPPED | OUTPATIENT
Start: 2020-04-22 | End: 2020-04-27

## 2020-04-22 RX ORDER — LEVOFLOXACIN 500 MG/1
500 TABLET, FILM COATED ORAL DAILY
Qty: 5 TABLET | Refills: 0 | Status: SHIPPED | OUTPATIENT
Start: 2020-04-22 | End: 2020-04-27

## 2020-04-22 RX ADMIN — LEVOFLOXACIN 500 MG: 500 INJECTION, SOLUTION INTRAVENOUS at 11:04

## 2020-04-22 RX ADMIN — APIXABAN 5 MG: 5 TABLET, FILM COATED ORAL at 08:04

## 2020-04-22 RX ADMIN — DILTIAZEM HYDROCHLORIDE 240 MG: 240 CAPSULE, EXTENDED RELEASE ORAL at 08:04

## 2020-04-22 RX ADMIN — ACETAMINOPHEN 650 MG: 325 TABLET ORAL at 12:04

## 2020-04-22 RX ADMIN — METRONIDAZOLE 500 MG: 500 INJECTION, SOLUTION INTRAVENOUS at 05:04

## 2020-04-22 NOTE — PLAN OF CARE
04/22/20 1348   Discharge Assessment   Assessment Type Discharge Planning Assessment   Confirmed/corrected address and phone number on facesheet? Yes   Assessment information obtained from? Patient;Caregiver   Expected Length of Stay (days) 1   Communicated expected length of stay with patient/caregiver yes   Prior to hospitilization cognitive status: Alert/Oriented   Prior to hospitalization functional status: Assistive Equipment;Needs Assistance  (Parkinson Disease)   Current cognitive status: Alert/Oriented   Current Functional Status: Assistive Equipment;Needs Assistance   Facility Arrived From: Home   Able to Return to Prior Arrangements yes   Is patient able to care for self after discharge? No  (Needs assistance from  to get up and down to bed or chair)   Who are your caregiver(s) and their phone number(s)? Spouse Mr Isak Alas cell [phone] of home [phone]   Patient's perception of discharge disposition home or selfcare;home health   Readmission Within the Last 30 Days no previous admission in last 30 days   Patient currently being followed by outpatient case management? Yes   If yes, name of outpatient case management following: [facility] outpatient case management  ([facility] JOHN/FRANDY)   Patient currently receives any other outside agency services? No   Equipment Currently Used at Home rollator;walker, rolling;bedside commode;lift device  (Bed Rails)   Do you have any problems affording any of your prescribed medications? No   Is the patient taking medications as prescribed? yes   Does the patient have transportation home? Yes   Transportation Anticipated family or friend will provide   Discharge Plan A Home with family;Home Health   Discharge Plan B Home with family;Home Health   DME Needed Upon Discharge  none   Patient/Family in Agreement with Plan yes     Spoke with pt and spouse Mr Parisi for initial assessment. CM will follow for DC Planning Needs.

## 2020-04-22 NOTE — DISCHARGE SUMMARY
[facility]  Discharge Summary      Patient Name: Radha Alas  MRN: [id number]  Admission Date: 4/20/2020  Hospital Length of Stay: 0 days  Discharge Date and Time: 4/22/2020  1:00 PM  Attending Physician: No att. providers found   Discharging Provider: Christophe Guerra MD  Primary Care Provider: Rudi Tamez DO      HPI:   79-year-old  female with multiple medical comorbidities including but not limited to chronic atrial fibrillation, bladder prolapse, history of endometrial cancer, peptic ulcer disease and Parkinson's disease presents to the ED with chief complaint of acute on chronic abdominal pain and vomiting.    Reports history of chronic abdominal pain.  However she started to notice acute worsening since this morning.  It is mostly over the right lower quadrant but radiates occasionally to involve bilateral lower quadrants.  It is cramping in nature.  Exacerbated by food intake.  No relieving factors.  Associated with 3 episodes of vomiting since this morning.  She does also endorse increased urinary frequency.  She denies dysuria or hematuria.  She denies fever, chills, chest pain or shortness of breath.  She does have chronic resting tremors of bilateral upper extremities secondary to Parkinson's disease.  She does use a walker at baseline to ambulate and admits to history of frequent falls.    In the ED:  Noted to be tachycardic and tachypneic.  Afebrile.  Labs with ARPIT and acidosis.  Urinalysis positive.  Rapid COVID screen negative.  CT abdomen with colitis.  Received IV fluids and levofloxacin-metronidazole.    Rest of the 10 point review of systems is negative except as mentioned above.    * No surgery found *      Hospital Course:   79-year-old  female with chronic atrial fibrillation, bladder prolapse, history of endometrial cancer, peptic ulcer disease as well as Parkinson's disease with resting tremor of bilateral upper extremities  presented with acute on chronic abdominal pain vomiting.  Workup revealed left-sided colitis as well as UTI.  Symptoms improved with IV fluids and antibiotics.  Able to tolerate diet without any significant issues.  Transition to oral antibiotics and deemed medically stable for discharge.  Seen by GI who recommended outpatient follow-up for colonoscopy.  Will resume home health at the time of discharge in the light of her frequent falls.  Advised her to discuss PCP about Urology versus gynecology referral for bladder prolapse; she voiced understanding of the plan.    Instructions provided to follow up with primary care physician as outpatient. Patient verbalized understanding and is aware to contact primary care physician or return to ED if new or worsening symptoms.    Physical exam on the day of discharge:  General: Patient resting comfortably in no acute distress.  Lungs: CTA. Good air entry.  Cor:  Irregularly irregular. No murmurs. No pedal edema.  Abd: Soft. Nontender. Non-distended.  Neuro: A&O x3. Moving all 4 extremities equally  Ext: No clubbing. No cyanosis.        Consults:   Consults (From admission, onward)        Status Ordering Provider     Inpatient consult to Gastroenterology  Once     Provider:  Alfonzo Chapman MD    Completed TOÑITO MCKEE     Inpatient consult to Hospitalist  Once     Provider:  (Not yet assigned)    Acknowledged TOÑITO MCKEE          No new Assessment & Plan notes have been filed under this hospital service since the last note was generated.  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, with long-term current use of insulin [E11.3393, Z79.4] 08/24/2016 Not Applicable    Chronic atrial fibrillation [I48.20]  Yes    Parkinson disease [G20] 06/23/2014 Yes    Chronic constipation [K59.09] 01/23/2013 Yes    History of endometrial cancer [Z85.42] 10/08/2012 Not Applicable      Problems Resolved During this Admission:    Diagnosis Date Noted Date Resolved POA    ARPIT (acute kidney injury) [N17.9] 04/20/2020 04/21/2020 Yes    Acute cystitis without hematuria [N30.00] 04/20/2020 04/22/2020 Yes       Discharged Condition: fair    Disposition: Home-Health Care [facility]    Follow Up:  Follow-up Information     Rudi Tamez DO. Schedule an appointment as soon as possible for a visit in 2 weeks.    Specialty:  Family Medicine  Why:  Discuss about urology versus gynecology referral for bladder prolapse  Contact information:  [address]  [place] [address]  [phone]             Jair Kumari III, MD. Schedule an appointment as soon as possible for a visit in 4 weeks.    Specialty:  Gastroenterology  Why:  Follow-up colonoscopy due to recent colitis  Contact information:  [address] [facility]  [place] [address]  [phone]                 Patient Instructions:      Diet Cardiac     Diet diabetic     Notify your health care provider if you experience any of the following:  temperature >100.4     Notify your health care provider if you experience any of the following:  persistent nausea and vomiting or diarrhea     Notify your health care provider if you experience any of the following:  persistent dizziness, light-headedness, or visual disturbances     Activity as tolerated       Significant Diagnostic Studies: Labs:   CMP   Recent Labs   Lab 04/21/20  0431 04/22/20  0505    138   K 4.0 3.7    107   CO2 24 24   * 96   BUN 26* 16   CREATININE 0.9 0.8   CALCIUM 8.1* 8.1*   ANIONGAP 6* 7*   ESTGFRAFRICA >60.0 >60.0   EGFRNONAA >60.0 >60.0     "and CBC   Recent Labs   Lab 04/21/20  0432 04/22/20  0505   WBC 12.77* 11.35   HGB 12.3 11.7*   HCT 38.0 35.7*    223     Microbiology Results (last 7 days)     Procedure Component Value Units Date/Time    Urine culture [[id number]]  (Abnormal)  (Susceptibility) Collected:  04/20/20 0840    Order Status:  Completed Specimen:  Urine Updated:  04/22/20 0738     Urine Culture, Routine ESCHERICHIA COLI  >100,000 cfu/ml      Narrative:       Preferred Collection Type->Urine, Catheterized  Specimen Source->Urine          Pending Diagnostic Studies:     None         Medications:  Reconciled Home Medications:      Medication List      START taking these medications    levoFLOXacin 500 MG tablet  Commonly known as:  LEVAQUIN  Take 1 tablet (500 mg total) by mouth once daily. for 5 days     metroNIDAZOLE 500 MG tablet  Commonly known as:  FLAGYL  Take 1 tablet (500 mg total) by mouth 3 (three) times daily. for 5 days        CHANGE how you take these medications    CARTIA  MG Cp24  Generic drug:  diltiaZEM  Take one capsule every evening  What changed:  Another medication with the same name was removed. Continue taking this medication, and follow the directions you see here.        CONTINUE taking these medications    ELIQUIS 5 mg Tab  Generic drug:  apixaban  TK 1 T PO BID     glimepiride 4 MG tablet  Commonly known as:  AMARYL  TAKE 1 TABLET(4 MG) BY MOUTH DAILY WITH BREAKFAST     insulin detemir U-100 100 unit/mL (3 mL) Inpn pen  Commonly known as:  LEVEMIR FLEXTOUCH U-100 INSULN  INJECT 20 UNITS UNDER THE SKIN EVERY NIGHT AT BEDTIME     lisinopriL 20 MG tablet  Commonly known as:  PRINIVIL,ZESTRIL  1 TABLET ONCE DAILY     pen needle, diabetic 31 gauge x 3/16" Ndle  Commonly known as:  BD ULTRA-FINE MINI PEN NEEDLE  USE DAILY TO ADMINISTER INSULIN     TYLENOL ARTHRITIS ORAL  Take by mouth.        STOP taking these medications    ranitidine 150 MG tablet  Commonly known as:  ZANTAC            Indwelling " Lines/Drains at time of discharge:   Lines/Drains/Airways     None                 Time spent on the discharge of patient: 32 minutes  Patient was seen and examined on the date of discharge and determined to be suitable for discharge.         Christophe Guerra MD  Department of Hospital Medicine  [facility]

## 2020-04-22 NOTE — HOSPITAL COURSE
79-year-old  female with chronic atrial fibrillation, bladder prolapse, history of endometrial cancer, peptic ulcer disease as well as Parkinson's disease with resting tremor of bilateral upper extremities presented with acute on chronic abdominal pain vomiting.  Workup revealed left-sided colitis as well as UTI.  Symptoms improved with IV fluids and antibiotics.  Able to tolerate diet without any significant issues.  Transition to oral antibiotics and deemed medically stable for discharge.  Seen by GI who recommended outpatient follow-up for colonoscopy.  Will resume home health at the time of discharge in the light of her frequent falls.  Advised her to discuss PCP about Urology versus gynecology referral for bladder prolapse; she voiced understanding of the plan.    Instructions provided to follow up with primary care physician as outpatient. Patient verbalized understanding and is aware to contact primary care physician or return to ED if new or worsening symptoms.    Physical exam on the day of discharge:  General: Patient resting comfortably in no acute distress.  Lungs: CTA. Good air entry.  Cor:  Irregularly irregular. No murmurs. No pedal edema.  Abd: Soft. Nontender. Non-distended.  Neuro: A&O x3. Moving all 4 extremities equally  Ext: No clubbing. No cyanosis.

## 2020-04-22 NOTE — PT/OT/SLP PROGRESS
Physical Therapy Treatment    Patient Name:  Radha Alas   MRN:  [id number]    Recommendations:     Discharge Recommendations:  home health PT   Discharge Equipment Recommendations: none   Barriers to discharge: None    Assessment:     Radha Alas is a 79 y.o. female admitted with a medical diagnosis of Colitis, acute.  She presents with the following impairments/functional limitations:  weakness, impaired endurance, impaired functional mobilty, gait instability, decreased coordination, impaired balance, abnormal tone. Patient presents in bed, lethargic, stating that she did not have a good night last night. Pt required max assist x 2 for bed mobility and performed sit > stand with mod assist. Pt able to take steps to and from BSC with RW and Mod Assist. Pt ambulates with flexed posture and short, shuffling steps. Max A required for hygiene. Continue with PT and POC.    Rehab Prognosis: Good; patient would benefit from acute skilled PT services to address these deficits and reach maximum level of function.    Recent Surgery: * No surgery found *      Plan:     During this hospitalization, patient to be seen 6 x/week to address the identified rehab impairments via gait training, therapeutic activities, therapeutic exercises and progress toward the following goals:    · Plan of Care Expires:  05/21/20    Subjective     Chief Complaint: No complaints  Patient/Family Comments/goals:   Pain/Comfort:  · Pain Rating 1: 0/10      Objective:     Communicated with RN prior to session.  Patient found HOB elevated with blood pressure cuff, bed alarm, telemetry upon PT entry to room.     General Precautions: Standard, fall   Orthopedic Precautions:    Braces:       Functional Mobility:  · Bed Mobility:     · Supine to Sit: maximal assistance and of 2 persons  · Sit to Supine: maximal assistance and of 2 persons  · Transfers:     · Sit to Stand:  moderate assistance with rolling walker  · Toilet Transfer: moderate assistance with   rolling walker  using  Step Transfer  · Gait: few steps to and from [facility] with RW and Mod Assist      AM-PAC 6 CLICK MOBILITY          Therapeutic Activities and Exercises:   bed mobility; sitting EOB for trunk control and midline orientation; sit <> stands; transfer training    Patient left HOB elevated with all lines intact, call button in reach and bed alarm on..    GOALS:   Multidisciplinary Problems     Physical Therapy Goals        Problem: Physical Therapy Goal    Goal Priority Disciplines Outcome Goal Variances Interventions   Physical Therapy Goal     PT, PT/OT Ongoing, Progressing     Description:  Goals to be met by: D/C    Patient will increase functional independence with mobility by performin. Supine to sit with MInimal Assistance  2. Sit to stand transfer with Minimal Assistance  3. Bed to chair transfer with Minimal Assistance using Rolling Walker  4. Gait  x 50 feet with Contact Guard Assistance using Rolling Walker.                       Time Tracking:     PT Received On: 20  PT Start Time: 925     PT Stop Time: 956  PT Total Time (min): 31 min     Billable Minutes: Therapeutic Activity 31    Treatment Type: Treatment  PT/PTA: PTA     PTA Visit Number: Trish Cole PTA  2020

## 2020-04-22 NOTE — PLAN OF CARE
04/22/20 1340   Discharge Assessment   Assessment Type Discharge Planning Reassessment     Spoke with patient and her  Mr Parisi on phone about Freedom of Choice Form for HH, explained to patient and family that they have the right to choose any agency, and a list of agencies was provided to patient and family to review, they verbalized an understanding, want to resume HH with [facility] as had previously to admit, and both ok with phone consent for HH, and form scanned into  notes.    Built and sent referral to [facility] at fax number [phone] and called the office at [phone], spoke with Mrs Wagner that referral sent and they have this  callback number.

## 2020-04-22 NOTE — PLAN OF CARE
Problem: Physical Therapy Goal  Goal: Physical Therapy Goal  Description  Goals to be met by: D/C    Patient will increase functional independence with mobility by performin. Supine to sit with MInimal Assistance  2. Sit to stand transfer with Minimal Assistance  3. Bed to chair transfer with Minimal Assistance using Rolling Walker  4. Gait  x 50 feet with Contact Guard Assistance using Rolling Walker.      Outcome: Ongoing, Progressing   Pt continues to progress towards goals.

## 2020-04-23 PROCEDURE — G0180 MD CERTIFICATION HHA PATIENT: HCPCS | Mod: ,,, | Performed by: FAMILY MEDICINE

## 2020-04-23 PROCEDURE — G0180 PR HOME HEALTH MD CERTIFICATION: ICD-10-PCS | Mod: ,,, | Performed by: FAMILY MEDICINE

## 2020-04-28 ENCOUNTER — EXTERNAL HOME HEALTH (OUTPATIENT)
Dept: HOME HEALTH SERVICES | Facility: HOSPITAL | Age: 80
End: 2020-04-28
Payer: MEDICARE

## 2020-04-28 ENCOUNTER — PATIENT MESSAGE (OUTPATIENT)
Dept: FAMILY MEDICINE | Facility: CLINIC | Age: 80
End: 2020-04-28

## 2020-04-29 RX ORDER — OMEPRAZOLE 40 MG/1
40 CAPSULE, DELAYED RELEASE ORAL DAILY
Qty: 90 CAPSULE | Refills: 3 | Status: SHIPPED | OUTPATIENT
Start: 2020-04-29 | End: 2020-05-13

## 2020-05-05 ENCOUNTER — PATIENT MESSAGE (OUTPATIENT)
Dept: ADMINISTRATIVE | Facility: HOSPITAL | Age: 80
End: 2020-05-05

## 2020-05-06 NOTE — PT/OT/SLP DISCHARGE
Occupational Therapy Discharge Summary    Radha Alas  MRN: [id number]   Principal Problem: Colitis, acute      Patient Discharged from acute Occupational Therapy on 4/22/2020.  Please refer to prior OT note dated 4/22/2020 for functional status.    Assessment:      Patient was discharged unexpectedly.  Information required to complete an accurate discharge summary is unknown.  Refer to therapy initial evaluation and last progress note for initial and most recent functional status and goal achievement.  Recommendations made may be found in medical record. Patient has not met goals.    Objective:     GOALS:   Multidisciplinary Problems     Occupational Therapy Goals        Problem: Occupational Therapy Goal    Goal Priority Disciplines Outcome Interventions   Occupational Therapy Goal     OT, PT/OT     Description:  Goals to be met by: discharge     Patient will increase functional independence with ADLs by performing:    Feeding with Minimal Assistance.  UE Dressing with Minimal Assistance.  LE Dressing with Minimal Assistance.  Grooming while seated with Supervision.  Toileting from bedside commode with Minimal Assistance for hygiene and clothing management.   Toilet transfer to bedside commode with Minimal Assistance.                      Reasons for Discontinuation of Therapy Services  Transfer to alternate level of care.      Plan:     Patient Discharged to: Home with Home Health Service    Gayathri Otriz OT  5/6/2020

## 2020-05-13 ENCOUNTER — OFFICE VISIT (OUTPATIENT)
Dept: FAMILY MEDICINE | Facility: CLINIC | Age: 80
End: 2020-05-13
Payer: MEDICARE

## 2020-05-13 DIAGNOSIS — N81.10 FEMALE BLADDER PROLAPSE: ICD-10-CM

## 2020-05-13 DIAGNOSIS — K59.09 CHRONIC CONSTIPATION: ICD-10-CM

## 2020-05-13 DIAGNOSIS — N39.0 URINARY TRACT INFECTION WITHOUT HEMATURIA, SITE UNSPECIFIED: ICD-10-CM

## 2020-05-13 DIAGNOSIS — N17.9 AKI (ACUTE KIDNEY INJURY): ICD-10-CM

## 2020-05-13 DIAGNOSIS — K21.9 GERD WITHOUT ESOPHAGITIS: ICD-10-CM

## 2020-05-13 DIAGNOSIS — K52.9 ACUTE COLITIS: Primary | ICD-10-CM

## 2020-05-13 PROCEDURE — 1101F PR PT FALLS ASSESS DOC 0-1 FALLS W/OUT INJ PAST YR: ICD-10-PCS | Mod: CPTII,95,, | Performed by: FAMILY MEDICINE

## 2020-05-13 PROCEDURE — 99213 OFFICE O/P EST LOW 20 MIN: CPT | Mod: 95,,, | Performed by: FAMILY MEDICINE

## 2020-05-13 PROCEDURE — 1159F PR MEDICATION LIST DOCUMENTED IN MEDICAL RECORD: ICD-10-PCS | Mod: 95,,, | Performed by: FAMILY MEDICINE

## 2020-05-13 PROCEDURE — 1101F PT FALLS ASSESS-DOCD LE1/YR: CPT | Mod: CPTII,95,, | Performed by: FAMILY MEDICINE

## 2020-05-13 PROCEDURE — 1159F MED LIST DOCD IN RCRD: CPT | Mod: 95,,, | Performed by: FAMILY MEDICINE

## 2020-05-13 PROCEDURE — 99213 PR OFFICE/OUTPT VISIT, EST, LEVL III, 20-29 MIN: ICD-10-PCS | Mod: 95,,, | Performed by: FAMILY MEDICINE

## 2020-05-13 NOTE — PROGRESS NOTES
"Subjective:       Patient ID: Radha Alas is a 79 y.o. female.    Chief Complaint: No chief complaint on file.    HPI   The patient location is: [facility]  The chief complaint leading to consultation is: hospital follow up  Visit type: audiovisual  Total time spent with patient: 3:13-3:23pm  Each patient to whom he or she provides medical services by telemedicine is:  (1) informed of the relationship between the physician and patient and the respective role of any other health care provider with respect to management of the patient; and (2) notified that he or she may decline to receive medical services by telemedicine and may withdraw from such care at any time.    Notes:    Patient presents with family for a virtual visit for hospital follow-up as she refused to be seen in person due to the coronavirus pandemic.  She tells me she has been "doing fine" since discharge and family agrees and notes that she ran out of ranitidine before Prilosec was ordered and due to not having any acid reflux symptoms without taking anything for GERD she decided not to start taking Prilosec.  As she has not had a single episode of reflux symptoms over the last month or so they would like this removed from her medication list.  They have no other concerns or complaints but family asks that I sign her home health order to extend her PT/OT as home health reported to them the request was sent this week but never signed and her orders will  next week.  They report she has had significant improvement in strength, coordination and ability to care for herself with her therapy but has not met all of her goals yet with progress still being made.  After advising them that I had not received any such paperwork they asked if I would call home health to inquire about this.  She was seen in the ER at [facility] on 2020 with complaints of acutely worsening chronic abdominal pain and nausea that morning and the " development of vomiting with eating just before being seen.  She was tachycardic and tachypneic and found to have bladder prolapse on exam.  Further workup in the ER found UTI, acute kidney injury with acidosis and a CT of her abdomen with consistent findings for left-sided colitis.  She was admitted and treated with IV fluids and IV antibiotics initially and then transferred to oral antibiotics once tolerating p.o. intake.  She was seen by gastroenterology who recommended outpatient follow-up for colonoscopy and was advised to see uroGYN for her prolapse.  She was discharged on 4/22/2020 and completed 5 more days of oral Levaquin and Flagyl.  She has not had any further abdominal pain this discharge and denies any problems with bowel or bladder with exception of some constipation frequently.  She followed Gastroenterology for a virtual visit and was prescribed stool softener but they have not filled the medication yet.  Her last bowel movement today was soft, of normal caliber, brown and past easily without any difficulty or pain.  She is eating well and has not had any further nausea or vomiting.  She has been monitoring her blood sugars closely and has not had any signs or symptoms of hypoglycemia or hyperglycemia with average fasting blood sugars running around 100-110.  Blood pressures also remain well controlled checking less frequently with values generally running in the 120s-130s/70s.  She has not had any cardiac symptoms or any issues with breathing is and continues on Eliquis without any bleeding or bruising.        [facility] Medicine  Discharge Summary        Patient Name: Radha Alas  MRN: [id number]  Admission Date: 4/20/2020  Hospital Length of Stay: 0 days  Discharge Date and Time: 4/22/2020  1:00 PM  Attending Physician: No att. providers found   Discharging Provider: Christophe Guerra MD  Primary Care Provider: Rudi Tamez DO        HPI:   79-year-old   female with multiple medical comorbidities including but not limited to chronic atrial fibrillation, bladder prolapse, history of endometrial cancer, peptic ulcer disease and Parkinson's disease presents to the ED with chief complaint of acute on chronic abdominal pain and vomiting.     Reports history of chronic abdominal pain.  However she started to notice acute worsening since this morning.  It is mostly over the right lower quadrant but radiates occasionally to involve bilateral lower quadrants.  It is cramping in nature.  Exacerbated by food intake.  No relieving factors.  Associated with 3 episodes of vomiting since this morning.  She does also endorse increased urinary frequency.  She denies dysuria or hematuria.  She denies fever, chills, chest pain or shortness of breath.  She does have chronic resting tremors of bilateral upper extremities secondary to Parkinson's disease.  She does use a walker at baseline to ambulate and admits to history of frequent falls.     In the ED:  Noted to be tachycardic and tachypneic.  Afebrile.  Labs with ARPIT and acidosis.  Urinalysis positive.  Rapid COVID screen negative.  CT abdomen with colitis.  Received IV fluids and levofloxacin-metronidazole.     Rest of the 10 point review of systems is negative except as mentioned above.     * No surgery found *       Hospital Course:   79-year-old  female with chronic atrial fibrillation, bladder prolapse, history of endometrial cancer, peptic ulcer disease as well as Parkinson's disease with resting tremor of bilateral upper extremities presented with acute on chronic abdominal pain vomiting.  Workup revealed left-sided colitis as well as UTI.  Symptoms improved with IV fluids and antibiotics.  Able to tolerate diet without any significant issues.  Transition to oral antibiotics and deemed medically stable for discharge.  Seen by GI who recommended outpatient follow-up for colonoscopy.  Will resume home health at  the time of discharge in the light of her frequent falls.  Advised her to discuss PCP about Urology versus gynecology referral for bladder prolapse; she voiced understanding of the plan.     Instructions provided to follow up with primary care physician as outpatient. Patient verbalized understanding and is aware to contact primary care physician or return to ED if new or worsening symptoms.     Physical exam on the day of discharge:  General: Patient resting comfortably in no acute distress.  Lungs: CTA. Good air entry.  Cor:  Irregularly irregular. No murmurs. No pedal edema.  Abd: Soft. Nontender. Non-distended.  Neuro: A&O x3. Moving all 4 extremities equally  Ext: No clubbing. No cyanosis.         Consults:           Consults (From admission, onward)         Status Ordering Provider       Inpatient consult to Gastroenterology  Once     Provider:  Alfonzo Chapman MD    Completed TOÑITO MCKEE       Inpatient consult to Hospitalist  Once     Provider:  (Not yet assigned)    Acknowledged TOÑITO MCKEE             No new Assessment & Plan notes have been filed under this hospital service since the last note was generated.  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, with long-term current use of insulin [E11.3393, Z79.4] 08/24/2016 Not Applicable    Chronic atrial fibrillation [I48.20]   Yes    Parkinson disease [G20] 06/23/2014 Yes    Chronic constipation [K59.09] 01/23/2013 Yes    History of endometrial cancer [Z85.42] 10/08/2012 Not Applicable       Problems Resolved  During this Admission:     Diagnosis Date Noted Date Resolved POA    ARPIT (acute kidney injury) [N17.9] 04/20/2020 04/21/2020 Yes    Acute cystitis without hematuria [N30.00] 04/20/2020 04/22/2020 Yes         Review of Systems   Constitutional: Negative for activity change, appetite change, fatigue and unexpected weight change.   Respiratory: Negative for cough, chest tightness, shortness of breath and wheezing.    Cardiovascular: Negative for chest pain, palpitations and leg swelling.   Gastrointestinal: Positive for constipation. Negative for abdominal distention, abdominal pain, anal bleeding, blood in stool, diarrhea, nausea, rectal pain and vomiting.   Genitourinary: Negative for difficulty urinating, dysuria, flank pain, frequency and hematuria.   Musculoskeletal: Negative for arthralgias and myalgias.   Skin: Negative for rash and wound.   Neurological: Negative for tremors, syncope and weakness.   Hematological: Negative for adenopathy. Does not bruise/bleed easily.   Psychiatric/Behavioral: Negative for dysphoric mood and sleep disturbance. The patient is not nervous/anxious.          Objective:      There were no vitals filed for this visit.  Physical Exam   Constitutional: She is oriented to person, place, and time. She appears well-developed and well-nourished. No distress.   HENT:   Head: Normocephalic and atraumatic.   Pulmonary/Chest: Effort normal. No respiratory distress.   Neurological: She is alert and oriented to person, place, and time.   Skin: She is not diaphoretic.   Psychiatric: She has a normal mood and affect. Her behavior is normal. Judgment and thought content normal.         Assessment:       1. Acute colitis    2. Urinary tract infection without hematuria, site unspecified    3. ARPIT (acute kidney injury)    4. Female bladder prolapse    5. GERD without esophagitis    6. Chronic constipation          Plan:   Acute colitis    Urinary tract infection without hematuria, site  unspecified    ARPIT (acute kidney injury)    Female bladder prolapse    GERD without esophagitis    Chronic constipation      Follow up in about 3 months (around 8/13/2020).    Radha appears to be stable and doing today without further signs or symptoms of UTI or colitis.  She has had some issues with constipation and has had an unknown stool softener placed to her pharmacy by Gastroenterology outside of our system.  They are unsure of the doctor's name and advised him to call me with this information after delay fill her prescription at the pharmacy.  I did discuss use of fiber supplement, milk of magnesia or docusate sodium which could be used in addition to increased water intake if the stool softener prescribed is ineffective.  Her acute kidney injury resolved on labs prior to her discharge and discussing rechecking a hemoglobin A1c they were very resistant to even have this checked through home health as her blood sugars have been well controlled checking at home and she does not like to be stuck for this.  As long as she continues to do well and her blood sugars remain well controlled checking at home I did agree to put off repeat blood work until a three-month follow-up with me.  They will alert me if blood sugars become uncontrolled or she has any other problems before this.  She has assessment for her bladder prolapse previously and tells me that since this does not bother her at all she had no interest in surgical or any other treatment options.  I offered to provide her with referral for reassessment at any time if she changes her mind but she has no interest in this currently.  I have removed Prilosec from her medication list as requested and advised her to alert me if GERD symptoms presenting in the future.  She can use antacids as needed if symptoms return.

## 2020-05-15 ENCOUNTER — DOCUMENT SCAN (OUTPATIENT)
Dept: HOME HEALTH SERVICES | Facility: HOSPITAL | Age: 80
End: 2020-05-15
Payer: MEDICARE

## 2020-06-03 ENCOUNTER — TELEPHONE (OUTPATIENT)
Dept: FAMILY MEDICINE | Facility: CLINIC | Age: 80
End: 2020-06-03

## 2020-06-03 NOTE — TELEPHONE ENCOUNTER
----- Message from Chacha Nowak sent at 6/3/2020  9:40 AM CDT -----  Type: Needs Medical Advice  Who Called:  Dr. Pasquale Jackson/Alia Argueta Call Back Number: [phone]  Additional Information: Needs a copy of the patient's last visit. Please fax to [phone]

## 2020-06-08 ENCOUNTER — DOCUMENT SCAN (OUTPATIENT)
Dept: HOME HEALTH SERVICES | Facility: HOSPITAL | Age: 80
End: 2020-06-08
Payer: MEDICARE

## 2020-06-15 ENCOUNTER — DOCUMENT SCAN (OUTPATIENT)
Dept: HOME HEALTH SERVICES | Facility: HOSPITAL | Age: 80
End: 2020-06-15
Payer: MEDICARE

## 2020-06-19 ENCOUNTER — DOCUMENT SCAN (OUTPATIENT)
Dept: HOME HEALTH SERVICES | Facility: HOSPITAL | Age: 80
End: 2020-06-19
Payer: MEDICARE

## 2020-06-19 ENCOUNTER — PATIENT MESSAGE (OUTPATIENT)
Dept: NEUROLOGY | Facility: CLINIC | Age: 80
End: 2020-06-19

## 2020-06-19 DIAGNOSIS — G20.A1 PARKINSON DISEASE: Primary | ICD-10-CM

## 2020-06-22 PROCEDURE — G0179 MD RECERTIFICATION HHA PT: HCPCS | Mod: ,,, | Performed by: FAMILY MEDICINE

## 2020-06-22 PROCEDURE — G0179 PR HOME HEALTH MD RECERTIFICATION: ICD-10-PCS | Mod: ,,, | Performed by: FAMILY MEDICINE

## 2020-06-23 ENCOUNTER — TELEPHONE (OUTPATIENT)
Dept: FAMILY MEDICINE | Facility: CLINIC | Age: 80
End: 2020-06-23

## 2020-06-23 ENCOUNTER — TELEPHONE (OUTPATIENT)
Dept: NEUROLOGY | Facility: CLINIC | Age: 80
End: 2020-06-23

## 2020-06-23 RX ORDER — RASAGILINE 0.5 MG/1
0.5 TABLET ORAL DAILY
Qty: 30 TABLET | Refills: 2 | Status: SHIPPED | OUTPATIENT
Start: 2020-06-23 | End: 2021-01-07 | Stop reason: SDUPTHER

## 2020-06-23 NOTE — TELEPHONE ENCOUNTER
Patient requesting refill of Azilect.  3 month supply provided.  Patient referred to movement disorders last year.  Appointment made, unclear why never completed.  Patient has been advised she needs to follow up with them for further medication and management of her Parkinson's Disease.

## 2020-06-23 NOTE — TELEPHONE ENCOUNTER
Dr. Tamez is currently out of office on paternity leave. Would you be willing to write wound care orders for HH for pt?     Please advise and thanks in advance.

## 2020-06-23 NOTE — TELEPHONE ENCOUNTER
----- Message from Chacha Nowak sent at 6/23/2020  2:58 PM CDT -----  Type: Needs Medical Advice  Who Called:  [facility]  Best Call Back Number: [phone]  Additional Information: Has a stage 2 pressure ulcer. They need the doctor to sign wound care orders and the diagnosis of a state 3 pressure ulcer. Please fax to [phone].

## 2020-06-24 NOTE — TELEPHONE ENCOUNTER
Called  nurse puckett back regarding orders for wound care. Dr. Hyman will sign orders in office tomorrow afternoon.

## 2020-06-26 ENCOUNTER — EXTERNAL HOME HEALTH (OUTPATIENT)
Dept: HOME HEALTH SERVICES | Facility: HOSPITAL | Age: 80
End: 2020-06-26
Payer: MEDICARE

## 2020-06-26 NOTE — PROGRESS NOTES
60 Day Recert Order # [id number]  New Cert Period: 06/22 to 08/20/2020 with [facility] - Dr. Rudi Tamez

## 2020-07-02 ENCOUNTER — TELEPHONE (OUTPATIENT)
Dept: FAMILY MEDICINE | Facility: CLINIC | Age: 80
End: 2020-07-02

## 2020-07-02 DIAGNOSIS — Z79.4 CONTROLLED TYPE 2 DIABETES MELLITUS WITH BOTH EYES AFFECTED BY MODERATE NONPROLIFERATIVE RETINOPATHY WITHOUT MACULAR EDEMA, WITH LONG-TERM CURRENT USE OF INSULIN: ICD-10-CM

## 2020-07-02 DIAGNOSIS — E11.3393 CONTROLLED TYPE 2 DIABETES MELLITUS WITH BOTH EYES AFFECTED BY MODERATE NONPROLIFERATIVE RETINOPATHY WITHOUT MACULAR EDEMA, WITH LONG-TERM CURRENT USE OF INSULIN: ICD-10-CM

## 2020-07-02 RX ORDER — INSULIN DETEMIR 100 [IU]/ML
20 INJECTION, SOLUTION SUBCUTANEOUS NIGHTLY
Qty: 6 ML | Refills: 0 | Status: SHIPPED | OUTPATIENT
Start: 2020-07-02 | End: 2021-08-24 | Stop reason: SDUPTHER

## 2020-07-02 RX ORDER — PEN NEEDLE, DIABETIC 30 GX3/16"
NEEDLE, DISPOSABLE MISCELLANEOUS
Qty: 300 EACH | Refills: 3 | Status: SHIPPED | OUTPATIENT
Start: 2020-07-02 | End: 2020-11-13 | Stop reason: SDUPTHER

## 2020-07-02 NOTE — TELEPHONE ENCOUNTER
Pharmacy requesting BS supplies as pt has been without since mid June.     Dr. Tamez is currently out of office, can you please refill?     LOV: 5/13/20  LAB: 4/20/20  NOV: 7/7/20

## 2020-07-02 NOTE — TELEPHONE ENCOUNTER
Called [facility] and spoke with Denita the  to discuss video or virtual visit. She stated she thought they would be able to do a visit like that but was wondering how that would make sense in Dermatology. She also stated that she thought Jaime was still doing very well and that she is concerned about having him begin Humira again at this time without being in the clear from the virus yet. She realizes that this is not really her call but it is her opinion regarding his well being.     Dr. Foreman, please advise.    Called pharmacy, placed a request for BS supplies STAT. As pt has been without since mid June.

## 2020-07-02 NOTE — TELEPHONE ENCOUNTER
----- Message from Radha Dash sent at 7/2/2020  8:54 AM CDT -----  Type: Needs Medical Advice  Who Called:  Rita  Pharmacy name and phone #:  [facility]  Best Call Back Number: Call--[phone]  Additional Information: The caller states that a Req on serveral of her medications the caller would like a call back she said she has been trying to reach someone since June 25 an no reply.( the first Rx is Levimir Flex touch, and he 2nd Rx is Omeprazole 40mg, and Ultra fine mini pen needles.

## 2020-07-07 ENCOUNTER — OFFICE VISIT (OUTPATIENT)
Dept: FAMILY MEDICINE | Facility: CLINIC | Age: 80
End: 2020-07-07
Payer: MEDICARE

## 2020-07-07 VITALS
HEIGHT: 67 IN | TEMPERATURE: 99 F | DIASTOLIC BLOOD PRESSURE: 86 MMHG | WEIGHT: 180.56 LBS | HEART RATE: 71 BPM | BODY MASS INDEX: 28.34 KG/M2 | SYSTOLIC BLOOD PRESSURE: 128 MMHG | RESPIRATION RATE: 18 BRPM

## 2020-07-07 DIAGNOSIS — E11.69 HYPERLIPIDEMIA ASSOCIATED WITH TYPE 2 DIABETES MELLITUS: Primary | ICD-10-CM

## 2020-07-07 DIAGNOSIS — I15.2 HYPERTENSION ASSOCIATED WITH DIABETES: ICD-10-CM

## 2020-07-07 DIAGNOSIS — Z79.4 CONTROLLED TYPE 2 DIABETES MELLITUS WITH BOTH EYES AFFECTED BY MODERATE NONPROLIFERATIVE RETINOPATHY WITHOUT MACULAR EDEMA, WITH LONG-TERM CURRENT USE OF INSULIN: ICD-10-CM

## 2020-07-07 DIAGNOSIS — E11.3393 CONTROLLED TYPE 2 DIABETES MELLITUS WITH BOTH EYES AFFECTED BY MODERATE NONPROLIFERATIVE RETINOPATHY WITHOUT MACULAR EDEMA, WITH LONG-TERM CURRENT USE OF INSULIN: ICD-10-CM

## 2020-07-07 DIAGNOSIS — E11.59 HYPERTENSION ASSOCIATED WITH DIABETES: ICD-10-CM

## 2020-07-07 DIAGNOSIS — E78.5 HYPERLIPIDEMIA ASSOCIATED WITH TYPE 2 DIABETES MELLITUS: Primary | ICD-10-CM

## 2020-07-07 PROCEDURE — 99999 PR PBB SHADOW E&M-EST. PATIENT-LVL IV: CPT | Mod: PBBFAC,,, | Performed by: FAMILY MEDICINE

## 2020-07-07 PROCEDURE — 99213 OFFICE O/P EST LOW 20 MIN: CPT | Mod: S$GLB,,, | Performed by: FAMILY MEDICINE

## 2020-07-07 PROCEDURE — 3079F DIAST BP 80-89 MM HG: CPT | Mod: CPTII,S$GLB,, | Performed by: FAMILY MEDICINE

## 2020-07-07 PROCEDURE — 99213 PR OFFICE/OUTPT VISIT, EST, LEVL III, 20-29 MIN: ICD-10-PCS | Mod: S$GLB,,, | Performed by: FAMILY MEDICINE

## 2020-07-07 PROCEDURE — 1126F PR PAIN SEVERITY QUANTIFIED, NO PAIN PRESENT: ICD-10-PCS | Mod: S$GLB,,, | Performed by: FAMILY MEDICINE

## 2020-07-07 PROCEDURE — 1159F MED LIST DOCD IN RCRD: CPT | Mod: S$GLB,,, | Performed by: FAMILY MEDICINE

## 2020-07-07 PROCEDURE — 1126F AMNT PAIN NOTED NONE PRSNT: CPT | Mod: S$GLB,,, | Performed by: FAMILY MEDICINE

## 2020-07-07 PROCEDURE — 1159F PR MEDICATION LIST DOCUMENTED IN MEDICAL RECORD: ICD-10-PCS | Mod: S$GLB,,, | Performed by: FAMILY MEDICINE

## 2020-07-07 PROCEDURE — 1100F PTFALLS ASSESS-DOCD GE2>/YR: CPT | Mod: CPTII,S$GLB,, | Performed by: FAMILY MEDICINE

## 2020-07-07 PROCEDURE — 3079F PR MOST RECENT DIASTOLIC BLOOD PRESSURE 80-89 MM HG: ICD-10-PCS | Mod: CPTII,S$GLB,, | Performed by: FAMILY MEDICINE

## 2020-07-07 PROCEDURE — 99999 PR PBB SHADOW E&M-EST. PATIENT-LVL IV: ICD-10-PCS | Mod: PBBFAC,,, | Performed by: FAMILY MEDICINE

## 2020-07-07 PROCEDURE — 3288F PR FALLS RISK ASSESSMENT DOCUMENTED: ICD-10-PCS | Mod: CPTII,S$GLB,, | Performed by: FAMILY MEDICINE

## 2020-07-07 PROCEDURE — 3074F PR MOST RECENT SYSTOLIC BLOOD PRESSURE < 130 MM HG: ICD-10-PCS | Mod: CPTII,S$GLB,, | Performed by: FAMILY MEDICINE

## 2020-07-07 PROCEDURE — 1100F PR PT FALLS ASSESS DOC 2+ FALLS/FALL W/INJURY/YR: ICD-10-PCS | Mod: CPTII,S$GLB,, | Performed by: FAMILY MEDICINE

## 2020-07-07 PROCEDURE — 3074F SYST BP LT 130 MM HG: CPT | Mod: CPTII,S$GLB,, | Performed by: FAMILY MEDICINE

## 2020-07-07 PROCEDURE — 3288F FALL RISK ASSESSMENT DOCD: CPT | Mod: CPTII,S$GLB,, | Performed by: FAMILY MEDICINE

## 2020-07-07 RX ORDER — OFLOXACIN 3 MG/ML
SOLUTION/ DROPS OPHTHALMIC
COMMUNITY
Start: 2020-06-02 | End: 2021-03-31 | Stop reason: ALTCHOICE

## 2020-07-07 RX ORDER — CALCIUM CITRATE/VITAMIN D3 200MG-6.25
TABLET ORAL
COMMUNITY
Start: 2020-06-24 | End: 2020-10-08 | Stop reason: SDUPTHER

## 2020-07-07 RX ORDER — KETOROLAC TROMETHAMINE 5 MG/ML
SOLUTION OPHTHALMIC
COMMUNITY
Start: 2020-07-05 | End: 2021-03-31 | Stop reason: ALTCHOICE

## 2020-07-07 RX ORDER — PREDNISOLONE ACETATE 10 MG/ML
SUSPENSION/ DROPS OPHTHALMIC
COMMUNITY
Start: 2020-07-06 | End: 2021-03-31 | Stop reason: ALTCHOICE

## 2020-07-07 RX ORDER — POLYETHYLENE GLYCOL 3350 17 G/17G
17 POWDER, FOR SOLUTION ORAL DAILY PRN
COMMUNITY
Start: 2020-05-13

## 2020-07-07 NOTE — PROGRESS NOTES
Subjective:       Patient ID: Radha Alas is a 79 y.o. female.    Chief Complaint: Follow-up    HPI   Patient presents with her son reported they received a call and revised to return 1 month early for routine follow-up for unknown reason.  She tells me she has been good and her son reports she has been just fine without any significant problems since her last visit with me.  She has not had any further issues with significant constipation, any UTI symptoms and any GERD symptoms and reports all of her medications are working well without any adverse effects.  Some reports she has had much easier time with transferring at home now that they have a recliner with a lift assist feature and this has lessen the frequency and severity of arthritis symptoms.  They are interested in completing her labs today but she is not fasting.  They have no other concerns or complaints and she does not need any medication refills.  Blood sugars are still very well controlled with her son reporting over the last month her lowest fasting blood sugar was 88 and her highest has been 123 with an average around 105.  She still has no interest in any vaccinations and he tells me that she is up-to-date with her diabetic dilated eye examination completing this with her ophthalmologist (Dr. Fulton) just last month.      Review of Systems   Constitutional: Negative for activity change, appetite change, fatigue and unexpected weight change.   Respiratory: Negative for cough, chest tightness, shortness of breath and wheezing.    Cardiovascular: Negative for chest pain, palpitations and leg swelling.   Gastrointestinal: Negative for abdominal pain, blood in stool, constipation, diarrhea, nausea and vomiting.   Genitourinary: Positive for enuresis. Negative for dysuria, flank pain, frequency, hematuria and pelvic pain.   Musculoskeletal: Negative for arthralgias, back pain, gait problem, joint swelling, myalgias, neck pain and neck stiffness.  "  Skin: Negative for rash and wound.   Neurological: Negative for dizziness, light-headedness and headaches.   Hematological: Negative for adenopathy. Does not bruise/bleed easily.   Psychiatric/Behavioral: Negative for dysphoric mood and sleep disturbance. The patient is not nervous/anxious.          Objective:      Vitals:    07/07/20 1411   BP: 128/86   Pulse: 71   Resp: 18   Temp: 98.8 °F (37.1 °C)   TempSrc: Oral   Weight: 81.9 kg (180 lb 8.9 oz)   Height: 5' 7" (1.702 m)   PainSc: 0-No pain     Physical Exam  Vitals signs and nursing note reviewed.   Constitutional:       General: She is not in acute distress.     Appearance: She is well-developed. She is not diaphoretic.   HENT:      Head: Normocephalic and atraumatic.   Cardiovascular:      Rate and Rhythm: Normal rate and regular rhythm.      Heart sounds: Normal heart sounds. No murmur. No friction rub. No gallop.    Pulmonary:      Effort: Pulmonary effort is normal. No respiratory distress.      Breath sounds: Normal breath sounds. No wheezing.   Chest:      Chest wall: No tenderness.   Musculoskeletal: Normal range of motion.         General: No tenderness or deformity.      Comments: Diabetic foot exam was deferred to Podiatry.   Skin:     General: Skin is warm and dry.   Neurological:      Mental Status: She is alert and oriented to person, place, and time.   Psychiatric:         Behavior: Behavior normal.         Thought Content: Thought content normal.         Judgment: Judgment normal.           Assessment:       1. Hyperlipidemia associated with type 2 diabetes mellitus    2. 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 Hypertension associated with diabetes          Plan:   Hyperlipidemia associated with type 2 diabetes mellitus  -     Lipid Panel; Future; Expected date: 07/07/2020    Controlled type 2 diabetes mellitus with " both eyes affected by moderate nonproliferative retinopathy without macular edema, with long-term current use of insulin  -     Hemoglobin A1C; Future; Expected date: 07/07/2020    Hypertension associated with diabetes  -     CBC auto differential; Future; Expected date: 07/07/2020  -     Comprehensive metabolic panel; Future; Expected date: 07/07/2020      Follow up in about 6 months (around 1/7/2021).    Radha appears to be stable well today on her current medications and recommended no change in her current treatment.  I suggested checking a CBC, CMP, hemoglobin A1c and lipid panel at their convenience and they would like to return in a few weeks for this.  I will call them with results but as long as no significant abnormalities are found she continues to do well I will follow up with her at routine 6 month interval.  Sooner if any problems.  I did recommend a follow-up appointment with podiatry for her diabetic foot exam but I am happy to complete this at any follow-up visit if they wish.  I briefly discussed all of her overdue vaccines but they have no interest in vaccination.  I have asked my nurse to obtain her recent diabetic dilated eye examination and will update her health maintenance issues once this record is obtained and reviewed.

## 2020-07-23 ENCOUNTER — TELEPHONE (OUTPATIENT)
Dept: FAMILY MEDICINE | Facility: CLINIC | Age: 80
End: 2020-07-23

## 2020-07-23 NOTE — TELEPHONE ENCOUNTER
----- Message from Esthela Peterson sent at 7/23/2020  3:06 PM CDT -----  Contact: Kristy  Type: Needs Medical Advice  Who Called:  [facility]  Symptoms (please be specific):    How long has patient had these symptoms:    Pharmacy name and phone #:    Best Call Back Number:   Additional Information: calling regarding orders that is epic [facility]. Requesting a call back

## 2020-08-12 ENCOUNTER — LAB VISIT (OUTPATIENT)
Dept: LAB | Facility: HOSPITAL | Age: 80
End: 2020-08-12
Attending: FAMILY MEDICINE
Payer: MEDICARE

## 2020-08-12 DIAGNOSIS — E11.69 DIABETES MELLITUS ASSOCIATED WITH HORMONAL ETIOLOGY: ICD-10-CM

## 2020-08-12 DIAGNOSIS — D12.0 BENIGN NEOPLASM OF CECUM: Primary | ICD-10-CM

## 2020-08-12 DIAGNOSIS — I10 ESSENTIAL HYPERTENSION, MALIGNANT: ICD-10-CM

## 2020-08-12 DIAGNOSIS — E78.5 HYPERLIPEMIA: ICD-10-CM

## 2020-08-12 LAB
BASOPHILS # BLD AUTO: 0.03 K/UL (ref 0–0.2)
BASOPHILS NFR BLD: 0.5 % (ref 0–1.9)
DIFFERENTIAL METHOD: ABNORMAL
EOSINOPHIL # BLD AUTO: 0.1 K/UL (ref 0–0.5)
EOSINOPHIL NFR BLD: 1.5 % (ref 0–8)
ERYTHROCYTE [DISTWIDTH] IN BLOOD BY AUTOMATED COUNT: 12.8 % (ref 11.5–14.5)
HCT VFR BLD AUTO: 43.5 % (ref 37–48.5)
HGB BLD-MCNC: 13.8 G/DL (ref 12–16)
IMM GRANULOCYTES # BLD AUTO: 0.02 K/UL (ref 0–0.04)
IMM GRANULOCYTES NFR BLD AUTO: 0.3 % (ref 0–0.5)
LYMPHOCYTES # BLD AUTO: 2 K/UL (ref 1–4.8)
LYMPHOCYTES NFR BLD: 33.7 % (ref 18–48)
MCH RBC QN AUTO: 30.9 PG (ref 27–31)
MCHC RBC AUTO-ENTMCNC: 31.7 G/DL (ref 32–36)
MCV RBC AUTO: 98 FL (ref 82–98)
MONOCYTES # BLD AUTO: 0.4 K/UL (ref 0.3–1)
MONOCYTES NFR BLD: 6.3 % (ref 4–15)
NEUTROPHILS # BLD AUTO: 3.5 K/UL (ref 1.8–7.7)
NEUTROPHILS NFR BLD: 57.7 % (ref 38–73)
NRBC BLD-RTO: 0 /100 WBC
PLATELET # BLD AUTO: 244 K/UL (ref 150–350)
PMV BLD AUTO: 11 FL (ref 9.2–12.9)
RBC # BLD AUTO: 4.46 M/UL (ref 4–5.4)
WBC # BLD AUTO: 6 K/UL (ref 3.9–12.7)

## 2020-08-12 PROCEDURE — 80061 LIPID PANEL: CPT

## 2020-08-12 PROCEDURE — 85025 COMPLETE CBC W/AUTO DIFF WBC: CPT

## 2020-08-12 PROCEDURE — 83036 HEMOGLOBIN GLYCOSYLATED A1C: CPT

## 2020-08-12 PROCEDURE — 80053 COMPREHEN METABOLIC PANEL: CPT

## 2020-08-13 LAB
ALBUMIN SERPL BCP-MCNC: 3.7 G/DL (ref 3.5–5.2)
ALP SERPL-CCNC: 87 U/L (ref 55–135)
ALT SERPL W/O P-5'-P-CCNC: 10 U/L (ref 10–44)
ANION GAP SERPL CALC-SCNC: 13 MMOL/L (ref 8–16)
AST SERPL-CCNC: 14 U/L (ref 10–40)
BILIRUB SERPL-MCNC: 0.5 MG/DL (ref 0.1–1)
BUN SERPL-MCNC: 10 MG/DL (ref 8–23)
CALCIUM SERPL-MCNC: 9.3 MG/DL (ref 8.7–10.5)
CHLORIDE SERPL-SCNC: 104 MMOL/L (ref 95–110)
CHOLEST SERPL-MCNC: 172 MG/DL (ref 120–199)
CHOLEST/HDLC SERPL: 2.8 {RATIO} (ref 2–5)
CO2 SERPL-SCNC: 26 MMOL/L (ref 23–29)
CREAT SERPL-MCNC: 0.7 MG/DL (ref 0.5–1.4)
EST. GFR  (AFRICAN AMERICAN): >60 ML/MIN/1.73 M^2
EST. GFR  (NON AFRICAN AMERICAN): >60 ML/MIN/1.73 M^2
ESTIMATED AVG GLUCOSE: 128 MG/DL (ref 68–131)
GLUCOSE SERPL-MCNC: 71 MG/DL (ref 70–110)
HBA1C MFR BLD HPLC: 6.1 % (ref 4–5.6)
HDLC SERPL-MCNC: 62 MG/DL (ref 40–75)
HDLC SERPL: 36 % (ref 20–50)
LDLC SERPL CALC-MCNC: 85 MG/DL (ref 63–159)
NONHDLC SERPL-MCNC: 110 MG/DL
POTASSIUM SERPL-SCNC: 3.6 MMOL/L (ref 3.5–5.1)
PROT SERPL-MCNC: 7.5 G/DL (ref 6–8.4)
SODIUM SERPL-SCNC: 143 MMOL/L (ref 136–145)
TRIGL SERPL-MCNC: 125 MG/DL (ref 30–150)

## 2020-08-20 ENCOUNTER — NURSE TRIAGE (OUTPATIENT)
Dept: ADMINISTRATIVE | Facility: CLINIC | Age: 80
End: 2020-08-20

## 2020-08-20 NOTE — TELEPHONE ENCOUNTER
Pain to right side, worse when weather is bad. Fall x 2-3 days ago. Pt endorses right sided weakness, chronic, no new or worsening symptoms. Calling for pain, took tylenol this morning with some relief. Pain is currently a 6/10, somewhat improved from tylenol.  Pt states just trying to make appt with PCP, Dr. Tamez. Pt states  cares for her but is not currently with her. Pt declines appt with another provider. Difficult to obtain details from pt but instructed her to call back for any worsening symptoms. VU. Informed pt I will send Dr. Tamez an urgent message. VU.     Reason for Disposition   Nursing judgment    Protocols used: NO PROTOCOL AVAILABLE - INFORMATION ONLY-A-[place]

## 2020-08-23 DIAGNOSIS — E11.3393 CONTROLLED TYPE 2 DIABETES MELLITUS WITH BOTH EYES AFFECTED BY MODERATE NONPROLIFERATIVE RETINOPATHY WITHOUT MACULAR EDEMA, WITH LONG-TERM CURRENT USE OF INSULIN: ICD-10-CM

## 2020-08-23 DIAGNOSIS — I15.2 HYPERTENSION ASSOCIATED WITH DIABETES: ICD-10-CM

## 2020-08-23 DIAGNOSIS — E11.59 HYPERTENSION ASSOCIATED WITH DIABETES: ICD-10-CM

## 2020-08-23 DIAGNOSIS — Z79.4 CONTROLLED TYPE 2 DIABETES MELLITUS WITH BOTH EYES AFFECTED BY MODERATE NONPROLIFERATIVE RETINOPATHY WITHOUT MACULAR EDEMA, WITH LONG-TERM CURRENT USE OF INSULIN: ICD-10-CM

## 2020-08-27 RX ORDER — LISINOPRIL 20 MG/1
TABLET ORAL
Qty: 90 TABLET | Refills: 3 | Status: SHIPPED | OUTPATIENT
Start: 2020-08-27 | End: 2021-08-12 | Stop reason: SDUPTHER

## 2020-09-10 ENCOUNTER — TELEPHONE (OUTPATIENT)
Dept: FAMILY MEDICINE | Facility: CLINIC | Age: 80
End: 2020-09-10

## 2020-09-10 NOTE — TELEPHONE ENCOUNTER
Mailbox full, unable to leave message.    Requesting HH for difficulty walking. Can order be placed?

## 2020-09-10 NOTE — TELEPHONE ENCOUNTER
----- Message from Stephany Feliciano sent at 9/10/2020  1:31 PM CDT -----  Regarding: orders for homehealth put in  Contact: patient  Type: Needs Medical Advice  Who Called:  patient  Symptoms (please be specific):  can not walk well  How long has patient had these symptoms:  ?  Pharmacy name and phone #:  na  Best Call Back Number: [phone]  Additional Information: Patient would like orders put in for homehealth to help with walking and to speak to the nurse .  Please call to advise.  Thanks!

## 2020-09-16 ENCOUNTER — TELEPHONE (OUTPATIENT)
Dept: FAMILY MEDICINE | Facility: CLINIC | Age: 80
End: 2020-09-16

## 2020-09-16 NOTE — TELEPHONE ENCOUNTER
----- Message from Marlys Tapia MA sent at 9/15/2020  4:11 PM CDT -----  Regarding: CB  PT stated she is unable to walk and is in some pain  and would like a call in regards to a commercial she saw an have a few questions about the medication.  Best Call Back #

## 2020-09-16 NOTE — TELEPHONE ENCOUNTER
Pt requested to add Humera injections to her medications because she saw a commercial that stated it was for people who couldn't walk, but wanted to walk again. She reports, she can't walk due to pain but she wants to walk again so please look into this. I tried to advise her that this medication would not treat her, but she insisted on having you look into it.

## 2020-09-25 ENCOUNTER — TELEPHONE (OUTPATIENT)
Dept: FAMILY MEDICINE | Facility: CLINIC | Age: 80
End: 2020-09-25

## 2020-10-07 ENCOUNTER — PATIENT MESSAGE (OUTPATIENT)
Dept: FAMILY MEDICINE | Facility: CLINIC | Age: 80
End: 2020-10-07

## 2020-10-08 RX ORDER — CALCIUM CITRATE/VITAMIN D3 200MG-6.25
TABLET ORAL
Qty: 50 STRIP | Refills: 3 | Status: SHIPPED | OUTPATIENT
Start: 2020-10-08 | End: 2020-11-16

## 2020-10-08 NOTE — TELEPHONE ENCOUNTER
LOV 7/7/2020  Last refill- 6/24/2020  Last labs 8/12/2020  Next visit 10/13/2020 (patient coming to est care with MELISSA Iqbal.)

## 2020-10-13 ENCOUNTER — EXTERNAL VISIT (OUTPATIENT)
Dept: PRIMARY CARE CLINIC | Facility: CLINIC | Age: 80
End: 2020-10-13
Payer: MEDICARE

## 2020-10-13 VITALS
WEIGHT: 165 LBS | DIASTOLIC BLOOD PRESSURE: 80 MMHG | TEMPERATURE: 98 F | HEART RATE: 74 BPM | OXYGEN SATURATION: 96 % | SYSTOLIC BLOOD PRESSURE: 120 MMHG | BODY MASS INDEX: 25.84 KG/M2

## 2020-10-13 DIAGNOSIS — K21.9 GERD WITHOUT ESOPHAGITIS: ICD-10-CM

## 2020-10-13 DIAGNOSIS — Z79.4 TYPE 2 DIABETES MELLITUS WITH DIABETIC NEUROPATHY, WITH LONG-TERM CURRENT USE OF INSULIN: ICD-10-CM

## 2020-10-13 DIAGNOSIS — Z86.718 HISTORY OF DVT (DEEP VEIN THROMBOSIS): ICD-10-CM

## 2020-10-13 DIAGNOSIS — I48.20 CHRONIC ATRIAL FIBRILLATION: ICD-10-CM

## 2020-10-13 DIAGNOSIS — Z74.09 NEED FOR ASSISTANCE DUE TO REDUCED MOBILITY: ICD-10-CM

## 2020-10-13 DIAGNOSIS — E11.59 HYPERTENSION ASSOCIATED WITH DIABETES: ICD-10-CM

## 2020-10-13 DIAGNOSIS — E11.69 HYPERLIPIDEMIA ASSOCIATED WITH TYPE 2 DIABETES MELLITUS: ICD-10-CM

## 2020-10-13 DIAGNOSIS — K59.09 CHRONIC CONSTIPATION: ICD-10-CM

## 2020-10-13 DIAGNOSIS — G20.A1 PARKINSON DISEASE: Primary | ICD-10-CM

## 2020-10-13 DIAGNOSIS — E78.5 HYPERLIPIDEMIA ASSOCIATED WITH TYPE 2 DIABETES MELLITUS: ICD-10-CM

## 2020-10-13 DIAGNOSIS — N39.46 MIXED URGE AND STRESS INCONTINENCE: ICD-10-CM

## 2020-10-13 DIAGNOSIS — E11.40 TYPE 2 DIABETES MELLITUS WITH DIABETIC NEUROPATHY, WITH LONG-TERM CURRENT USE OF INSULIN: ICD-10-CM

## 2020-10-13 DIAGNOSIS — L89.152 PRESSURE INJURY OF SACRAL REGION, STAGE 2: ICD-10-CM

## 2020-10-13 DIAGNOSIS — I15.2 HYPERTENSION ASSOCIATED WITH DIABETES: ICD-10-CM

## 2020-10-13 DIAGNOSIS — M15.9 PRIMARY OSTEOARTHRITIS INVOLVING MULTIPLE JOINTS: ICD-10-CM

## 2020-10-13 PROCEDURE — 99350 PR HOME VISIT,ESTAB PATIENT,LEVEL IV: ICD-10-PCS | Mod: S$GLB,,, | Performed by: NURSE PRACTITIONER

## 2020-10-13 PROCEDURE — 99350 HOME/RES VST EST HIGH MDM 60: CPT | Mod: S$GLB,,, | Performed by: NURSE PRACTITIONER

## 2020-10-13 RX ORDER — OMEPRAZOLE 40 MG/1
40 CAPSULE, DELAYED RELEASE ORAL DAILY
Qty: 30 CAPSULE | Refills: 11
Start: 2020-10-13 | End: 2021-01-04 | Stop reason: SDUPTHER

## 2020-10-13 NOTE — PROGRESS NOTES
Primary Care Provider Appointment    Subjective:      Patient ID: Radha Alas is a 80 y.o. female.     Chief Complaint: No chief complaint on file.    81 y/o female with parkinson disease (2014), T2DM, a-fib, htn, hld, hx of dvt, chronic constipation, GERD, osteoarthritis and prolapse bladder.     Pt is being seen at home due to physical debility that presents a taxing effort to leave the home, to mitigate high risk of hospital readmission and/or due to the limited availability of reliable or safe options for transportation to the point of access to the provider. Prior to treatment on this visit the chart was reviewed and patient consent was obtained.     Pt is here with her , Isak Alas who is contributing to medical history. Pt has been diagnosed with Parkinson for 3 years now. Has had significant decline in the last year, now requiring assistance to ambulate and transfer.     Pt needs assistance with bathing, and sometimes feeding. Pt is wheelchair dependent.     Pt is not taking rasagiline daily, reports it causes stiffness and makes it difficult for pt to move around. Last dose 1 month ago. Pt will take it if she's shaking too much.     Today complaining of pressure ulcer on sacrum, will heal and and get worse.     HH recently completed 3 weeks ago.      Followed by:   Cardiology: Dr. Banda. Will request next appt 02/2021  Neurology: Dr. Karla Flanagan. Last seen 5/9/2020. plan to restart Azilect 0.5mg daily (she tolerated this in the past).  This will likely need increased to 1mg daily.  She was educated on exercise classes for PD as well as vocal classes for PD. She will follow up in movement disorders clinic.    Past Surgical History:   Procedure Laterality Date    COLONOSCOPY  prior to 2000    COLONOSCOPY N/A 9/28/2016    Procedure: COLONOSCOPY;  Surgeon: Baljinder Ospina MD;  [facility];  Service: Endoscopy;  Laterality: N/A;    FLEXIBLE SIGMOIDOSCOPY  06/05/2016    at [facility]- in media  section: poor prep, diverticulosis noted with old clots, internal hemorrhoids otherwise normal findings- recommend outpatient colonoscopy    FOREARM SURGERY      right forearm surgery    HERNIA REPAIR      ROBOTIC ASST    HYSTERECTOMY  11/1/2001    [facility]--cancer    PELVIC AND PARA-AORTIC LYMPH NODE DISSECTION      MI REMOVAL OF OVARY/TUBE(S)      ROBOTIC ASST    TUBAL LIGATION      UPPER GASTROINTESTINAL ENDOSCOPY         Past Medical History:   Diagnosis Date    Anemia     Atrial fibrillation     Chronic constipation     Deep vein thrombosis of left lower extremity     Diabetes mellitus     TYPE 2    Diverticulosis     Hypertension     Obesity     Parkinson disease     Peptic ulcer of stomach     Prolapse of female bladder, acquired     Rectocele     Uterine cancer 11-1-2011    STAGE 1-A GRADE 2       Social History     Socioeconomic History    Marital status:      Spouse name: Not on file    Number of children: Not on file    Years of education: Not on file    Highest education level: Not on file   Occupational History    Not on file   Social Needs    Financial resource strain: Not hard at all    Food insecurity     Worry: Never true     Inability: Never true    Transportation needs     Medical: No     Non-medical: No   Tobacco Use    Smoking status: Never Smoker    Smokeless tobacco: Never Used   Substance and Sexual Activity    Alcohol use: No     Alcohol/week: 0.0 standard drinks     Frequency: Never     Binge frequency: Never    Drug use: No    Sexual activity: Not Currently   Lifestyle    Physical activity     Days per week: 2 days     Minutes per session: 10 min    Stress: Not at all   Relationships    Social connections     Talks on phone: More than three times a week     Gets together: Three times a week     Attends Jainism service: Not on file     Active member of club or organization: Yes     Attends meetings of clubs or organizations: More than 4 times per  year     Relationship status:    Other Topics Concern    Not on file   Social History Narrative    Not on file       Review of Systems   Constitutional: Negative for activity change, appetite change, fatigue and unexpected weight change.   HENT: Negative for congestion, facial swelling, sinus pressure, sinus pain and sneezing.         Runny nose, clear nose    Eyes: Negative for visual disturbance.   Respiratory: Negative for cough, chest tightness, shortness of breath and wheezing.    Cardiovascular: Positive for leg swelling. Negative for chest pain and palpitations.   Gastrointestinal: Negative for abdominal pain, blood in stool, constipation, diarrhea, nausea and vomiting.        BM every other day    Genitourinary: Positive for enuresis. Negative for dysuria, flank pain, frequency, hematuria and pelvic pain.   Musculoskeletal: Positive for arthralgias, back pain and gait problem. Negative for joint swelling, myalgias, neck pain and neck stiffness.   Skin: Positive for wound. Negative for rash.   Neurological: Positive for tremors and weakness. Negative for dizziness, light-headedness and headaches.   Hematological: Negative for adenopathy. Does not bruise/bleed easily.   Psychiatric/Behavioral: Negative for dysphoric mood and sleep disturbance. The patient is not nervous/anxious.        Objective:   /80 (BP Location: Right arm, Patient Position: Sitting, BP Method: X-Large (Manual))   Pulse 74   Temp 97.5 °F (36.4 °C) (Temporal)   Wt 74.8 kg (165 lb)   SpO2 96%   BMI 25.84 kg/m²     Physical Exam  Vitals signs and nursing note reviewed.   Constitutional:       General: She is not in acute distress.     Appearance: She is well-developed. She is obese. She is not diaphoretic.   HENT:      Head: Normocephalic and atraumatic.      Right Ear: External ear normal.      Left Ear: External ear normal.   Eyes:      Pupils: Pupils are equal, round, and reactive to light.   Neck:      Musculoskeletal:  Neck supple. No muscular tenderness.   Cardiovascular:      Rate and Rhythm: Normal rate. Rhythm irregular.      Heart sounds: No friction rub. No gallop.    Pulmonary:      Effort: Pulmonary effort is normal. No respiratory distress.      Breath sounds: Normal breath sounds. No wheezing.   Chest:      Chest wall: No tenderness.   Abdominal:      General: Bowel sounds are normal. There is no distension.      Palpations: Abdomen is soft.      Tenderness: There is no abdominal tenderness.   Musculoskeletal: Normal range of motion.         General: Swelling and deformity present. No tenderness.      Comments: Deformity noted to bilateral hands, L hand contracture worse   Trace pitting pedal edema to BLE   Skin:     General: Skin is warm and dry.      Capillary Refill: Capillary refill takes less than 2 seconds.      Findings: Lesion present. No bruising.      Comments: Stage 2 pressure ulcer to sacrum    Neurological:      Mental Status: She is alert and oriented to person, place, and time.      Sensory: No sensory deficit.      Motor: Weakness present.      Gait: Gait abnormal.      Comments: Decreased strength RUE 4/5, LUE 3/5  BLE 3/5   Psychiatric:         Behavior: Behavior normal.         Thought Content: Thought content normal.         Judgment: Judgment normal.             Lab Results   Component Value Date    WBC 6.00 08/12/2020    HGB 13.8 08/12/2020    HCT 43.5 08/12/2020     08/12/2020    CHOL 172 08/12/2020    TRIG 125 08/12/2020    HDL 62 08/12/2020    ALT 10 08/12/2020    AST 14 08/12/2020     08/12/2020    K 3.6 08/12/2020     08/12/2020    CREATININE 0.7 08/12/2020    BUN 10 08/12/2020    CO2 26 08/12/2020    TSH 2.350 04/20/2020    INR 1.7 04/20/2020    HGBA1C 6.1 (H) 08/12/2020    MICROALBUR 4.0 09/12/2017       Medication List with Changes/Refills   New Medications    OMEPRAZOLE (PRILOSEC) 40 MG CAPSULE    Take 1 capsule (40 mg total) by mouth once daily.   Current Medications     "ACETAMINOPHEN (TYLENOL ARTHRITIS ORAL)    Take by mouth.    CARTIA  MG CP24    Take one capsule every evening    ELIQUIS 5 MG TAB    TK 1 T PO BID    GLIMEPIRIDE (AMARYL) 4 MG TABLET    TAKE 1 TABLET(4 MG) BY MOUTH DAILY WITH BREAKFAST    INSULIN DETEMIR U-100 (LEVEMIR FLEXTOUCH U-100 INSULN) 100 UNIT/ML (3 ML) INPN PEN    Inject 20 Units into the skin every evening.    KETOROLAC 0.5% (ACULAR) 0.5 % DROP    1 DROP TO AFFECTED EYE QID FOR 15 DAYS    LISINOPRIL (PRINIVIL,ZESTRIL) 20 MG TABLET    1 TABLET ONCE DAILY    OFLOXACIN (OCUFLOX) 0.3 % OPHTHALMIC SOLUTION    INT 1 GTT INTO OU QID UTD    PEN NEEDLE, DIABETIC (BD ULTRA-FINE MINI PEN NEEDLE) 31 GAUGE X 3/16" NDLE    USE DAILY TO ADMINISTER INSULIN    POLYETHYLENE GLYCOL (GLYCOLAX) 17 GRAM/DOSE POWDER    TK 17 GRAMS PO QD UTD    PREDNISOLONE ACETATE (PRED FORTE) 1 % DRPS        RASAGILINE (AZILECT) 0.5 MG TAB    Take 1 tablet (0.5 mg total) by mouth once daily.    TRUE METRIX GLUCOSE TEST STRIP STRP    Test glucose levels once daily for diabetes         Dictation #1  MRN:[id number]  CSN:[id number]          Assessment:   80 y.o. female with multiple co-morbid illnesses here to establish care with new PCP and continue chronic care management.     Plan:     Problem List Items Addressed This Visit        Neuro    Parkinson disease - Primary     Followed by neurology, last visit > 1 year ago, diagnosed in 2014  - severe bradykinesia and resting tremors  - requires assistance with all ADLs and IADLs except feeding.   - not currently taking azilect due to s/e of stiffness   - instructed  to make f/u appt with neurology asap to discuss alternative treatment.           Relevant Orders    Ambulatory referral/consult to Social Work    Ambulatory referral/consult to [facility]       Cardiac/Vascular    Chronic atrial fibrillation     Followed by cardiology  - rate controlled on cartia  - denies any palpitations  - cont NOAC          " Hypertension associated with diabetes     - bp well controlled on lisinopril   - cont as now          Hyperlipidemia associated with type 2 diabetes mellitus     - Lipids well controlled with diet modification  - cont as now             Renal/    Mixed urge and stress incontinence     - complicated by Parkinson, wears adult diapers  - stable, cont as now             Hematology    History of DVT (deep vein thrombosis)     - hx of dvt in LLE  - on eliquis  - stable, cont as now             Endocrine    Type 2 diabetes mellitus with diabetic neuropathy, with long-term current use of insulin     - DM well controlled, A1c 6.1  - fbg , ppg 140-198  - insulin on ss, administered by   - stable, cont as now             GI    Chronic constipation     Currently well controlled  - BM almost daily  - stable, cont as now          GERD without esophagitis     - well controlled on omeprazole  - stable, cont as now          Relevant Medications    omeprazole (PRILOSEC) 40 MG capsule       Orthopedic    Primary osteoarthritis involving multiple joints     - mostly affecting bilateral knees and back  - takes acetaminophen 500mg in the am and 1000mg in the pm  - pain currently 0/10  - cont as now             Other    Need for assistance due to reduced mobility     - secondary to Parkinson  - pt is wheelchair dependent  -  is primary caretaker  - refer to  for assistance with finding resources for personal aide and respite care         Pressure injury of sacral region, stage 2     Stage 2 pressure ulcer to scral area  - partial thickness loss of dermis without slough, wound bed pink. No drainage or warmth noted  -  is cleaning wound daily and applying moisture cream  - encourage to use reposition frequently along with donut use to decrease pressure.   - will cont to monitor                Health Maintenance       Date Due Completion Date    Pneumococcal Vaccine (65+ Low/Medium Risk) (1 of 2 - PCV13)  09/18/2004 ---    TETANUS VACCINE 01/09/2008 1/9/1998    Shingles Vaccine (2 of 3) 10/22/2015 8/27/2015    Foot Exam 03/11/2020 3/11/2019 (Done)    Override on 3/11/2019: Done    Eye Exam 05/08/2020 5/8/2019    Override on 9/14/2017: Done (Dr. Jackson--sent to scanning 9/21/17 kr)    Override on 8/29/2016: Done (Dr. Tsai- sent to scanning)    Override on 9/9/2015: Done (Dr Jackson in media)    Override on 5/31/2015: Done    Influenza Vaccine (1) 08/01/2020 3/10/2018 (Cl[facility]icallyNA)    Override on 3/10/2018: Not Clinically Appropriate    DEXA SCAN 09/12/2020 9/12/2017    Hemoglobin A1c 02/12/2021 8/12/2020    Override on 9/3/2015: Done ([facility] 6.9)    Lipid Panel 08/12/2021 8/12/2020    Override on 2/25/2016: Done ([facility]--in media)          No follow-ups on file. Total face-to-face time was 70 min, greater than 50% of this was spent on counseling and coordination of care.       NIKI Henderson-LENA  Family Medicine  [facility] [place]

## 2020-10-15 PROBLEM — L89.152 PRESSURE INJURY OF SACRAL REGION, STAGE 2: Status: ACTIVE | Noted: 2020-10-15

## 2020-10-15 NOTE — ASSESSMENT & PLAN NOTE
- secondary to Parkinson  - pt is wheelchair dependent  -  is primary caretaker  - refer to SW for assistance with finding resources for personal aide and respite care

## 2020-10-15 NOTE — ASSESSMENT & PLAN NOTE
Followed by neurology, last visit > 1 year ago, diagnosed in 2014  - severe bradykinesia and resting tremors  - requires assistance with all ADLs and IADLs except feeding.   - not currently taking azilect due to s/e of stiffness   - instructed  to make f/u appt with neurology asap to discuss alternative treatment.

## 2020-10-15 NOTE — ASSESSMENT & PLAN NOTE
Stage 2 pressure ulcer to scral area  - partial thickness loss of dermis without slough, wound bed pink. No drainage or warmth noted  -  is cleaning wound daily and applying moisture cream  - encourage to use reposition frequently along with donut use to decrease pressure.   - will cont to monitor

## 2020-10-15 NOTE — ASSESSMENT & PLAN NOTE
- DM well controlled, A1c 6.1  - fbg , ppg 140-198  - insulin on ss, administered by   - stable, cont as now

## 2020-10-22 ENCOUNTER — TELEPHONE (OUTPATIENT)
Dept: PRIMARY CARE CLINIC | Facility: CLINIC | Age: 80
End: 2020-10-22

## 2020-10-22 RX ORDER — BUSPIRONE HYDROCHLORIDE 7.5 MG/1
7.5 TABLET ORAL 3 TIMES DAILY
Qty: 90 TABLET | Refills: 11 | Status: SHIPPED | OUTPATIENT
Start: 2020-10-22 | End: 2021-01-28 | Stop reason: SDUPTHER

## 2020-10-22 NOTE — TELEPHONE ENCOUNTER
I informed the patient that Shahnaz Iqbal NP sent buspar 7.5mg to [facility] pharmacy and to take medication as directed.

## 2020-10-22 NOTE — TELEPHONE ENCOUNTER
Patient called and stated she was having anxiety issues and needed a nurse. Pt sounded as if she was in distress and I advised her if it was an medical emergency to call 911.     Called patient back a few minutes later to see if she calmed down or called 911, pt stated she's fine and calm. She does have someone with her at the house. She also asked can she be prescribed something for anxiety but did not want to be seen. She also mentioned taking tylenol for arthritis pain but couldn't explain what happened that she became anxious. Pt has not been diagnosed with anxiety and doesn't have a follow up appointment set up.

## 2020-10-28 NOTE — TELEPHONE ENCOUNTER
----- Message from Fanny Gómez sent at 1/14/2020  2:41 PM CST -----  Type: Needs Medical Advice    Who Called:  Patient  Best Call Back Number: [phone]  Additional Information: Patient calling back concerning doctor ordering physical therapy/please call patient back to advise.  
Patient advised she requested home physical therapy for her right knee. Please advise.  
Patient does understand that PT will only work on her knee.  
Never smoker

## 2020-10-30 ENCOUNTER — CARE AT HOME (OUTPATIENT)
Dept: HOME HEALTH SERVICES | Facility: CLINIC | Age: 80
End: 2020-10-30
Payer: MEDICARE

## 2020-10-30 VITALS
OXYGEN SATURATION: 98 % | RESPIRATION RATE: 16 BRPM | HEART RATE: 80 BPM | WEIGHT: 165 LBS | BODY MASS INDEX: 25.01 KG/M2 | TEMPERATURE: 98 F | SYSTOLIC BLOOD PRESSURE: 150 MMHG | DIASTOLIC BLOOD PRESSURE: 82 MMHG | HEIGHT: 68 IN

## 2020-10-30 DIAGNOSIS — Z74.09 NEED FOR ASSISTANCE DUE TO REDUCED MOBILITY: ICD-10-CM

## 2020-10-30 DIAGNOSIS — Z51.5 PALLIATIVE CARE ENCOUNTER: ICD-10-CM

## 2020-10-30 DIAGNOSIS — Z71.89 ADVANCED DIRECTIVES, COUNSELING/DISCUSSION: ICD-10-CM

## 2020-10-30 DIAGNOSIS — L89.152 PRESSURE INJURY OF SACRAL REGION, STAGE 2: Primary | ICD-10-CM

## 2020-10-30 DIAGNOSIS — I15.2 HYPERTENSION ASSOCIATED WITH DIABETES: ICD-10-CM

## 2020-10-30 DIAGNOSIS — F41.9 ANXIETY: ICD-10-CM

## 2020-10-30 DIAGNOSIS — E11.59 HYPERTENSION ASSOCIATED WITH DIABETES: ICD-10-CM

## 2020-10-30 DIAGNOSIS — G20.A1 PARKINSON DISEASE: ICD-10-CM

## 2020-10-30 PROCEDURE — 1126F PR PAIN SEVERITY QUANTIFIED, NO PAIN PRESENT: ICD-10-PCS | Mod: S$GLB,,, | Performed by: NURSE PRACTITIONER

## 2020-10-30 PROCEDURE — 3077F SYST BP >= 140 MM HG: CPT | Mod: CPTII,S$GLB,, | Performed by: NURSE PRACTITIONER

## 2020-10-30 PROCEDURE — 1101F PR PT FALLS ASSESS DOC 0-1 FALLS W/OUT INJ PAST YR: ICD-10-PCS | Mod: CPTII,S$GLB,, | Performed by: NURSE PRACTITIONER

## 2020-10-30 PROCEDURE — 3079F DIAST BP 80-89 MM HG: CPT | Mod: CPTII,S$GLB,, | Performed by: NURSE PRACTITIONER

## 2020-10-30 PROCEDURE — 1101F PT FALLS ASSESS-DOCD LE1/YR: CPT | Mod: CPTII,S$GLB,, | Performed by: NURSE PRACTITIONER

## 2020-10-30 PROCEDURE — 1159F MED LIST DOCD IN RCRD: CPT | Mod: S$GLB,,, | Performed by: NURSE PRACTITIONER

## 2020-10-30 PROCEDURE — 99350 PR HOME VISIT,ESTAB PATIENT,LEVEL IV: ICD-10-PCS | Mod: S$GLB,,, | Performed by: NURSE PRACTITIONER

## 2020-10-30 PROCEDURE — 99497 ADVNCD CARE PLAN 30 MIN: CPT | Mod: 25,S$GLB,, | Performed by: NURSE PRACTITIONER

## 2020-10-30 PROCEDURE — 1159F PR MEDICATION LIST DOCUMENTED IN MEDICAL RECORD: ICD-10-PCS | Mod: S$GLB,,, | Performed by: NURSE PRACTITIONER

## 2020-10-30 PROCEDURE — 3079F PR MOST RECENT DIASTOLIC BLOOD PRESSURE 80-89 MM HG: ICD-10-PCS | Mod: CPTII,S$GLB,, | Performed by: NURSE PRACTITIONER

## 2020-10-30 PROCEDURE — 1126F AMNT PAIN NOTED NONE PRSNT: CPT | Mod: S$GLB,,, | Performed by: NURSE PRACTITIONER

## 2020-10-30 PROCEDURE — 99350 HOME/RES VST EST HIGH MDM 60: CPT | Mod: S$GLB,,, | Performed by: NURSE PRACTITIONER

## 2020-10-30 PROCEDURE — 3077F PR MOST RECENT SYSTOLIC BLOOD PRESSURE >= 140 MM HG: ICD-10-PCS | Mod: CPTII,S$GLB,, | Performed by: NURSE PRACTITIONER

## 2020-10-30 PROCEDURE — 99497 PR ADVNCD CARE PLAN 30 MIN: ICD-10-PCS | Mod: 25,S$GLB,, | Performed by: NURSE PRACTITIONER

## 2020-10-30 NOTE — PROGRESS NOTES
"[facility]  Palliative Care Home Visit    Visit Date: 10/30/2020  Encounter Provider: Rosa Collins NP  PCP:  Shahnaz Iqbal NP    Subjective:      Patient ID: Radha Alas is a 80 y.o. female.    Consult Requested By:  Shahnaz Iqbal  Reason for Consult:  Palliative Care    Radha is being seen at home due to physical debility that presents a taxing effort to leave the home, to mitigate high risk of hospital readmission and/or due to the limited availability of reliable or safe options for transportation to the point of access to the provider. Prior to treatment on this visit the chart was reviewed and patient consent was obtained.      Chief Complaint: Establish Care    Radha is an 80 year old female with parkinson disease (2014), T2DM, a-fib, htn, hld, hx of dvt, chronic constipation, GERD, osteoarthritis and prolapse bladder.     Radha is being seen today to establish Palliative Care. With this visit today I am greeted by her spouse Isak and he states he must finish up a conference call but will join use in 30 minutes. Radha is found sitting in her recliner chair AAO x 3, pleasant, slow to response and with profound tremors noted in both arms/hands. She provides her history stating that she is a retired teacher,  to Isak for 46 years and together they have 5 children. Isak is her primary caregiver.    Radha reports being diagnosed with Parkinson's in 2014 and has gotten weaker over the last year. She voices that she gets anxious and frustrated because she wants to walk again like she used to and feels she can do it. However, Isak won't let her as her gait is very unsteady and she is a fall risk. She has recently completed PT and "it didn't help much because they didn't get me walking again". Isak reports Radha has had significant decline in the last year, now requiring assistance to ambulate and transfer. She needs assistance with bathing, and sometimes feeding. She is wheelchair dependent and in " "need of a new wheelchair as she is using a family member's "old and worn out" chair per Isak. Will order for patient.      Radha is not taking rasagiline daily as recommended by Neurology because she reports it causes stiffness and makes it difficult for her to move around. Last dose 1 month ago. She will take it if she's shaking too much.      Isak and Radha reports a chronic pressure ulcer on her sacrum that will heal for a short time and then reoccur. Currently Isak is applying Neosporin to it and keeping her clean and dry. She does have some urinary incontinence at times but is able to get to the toilet with assistance.     VSS. Denies fever, chest pain, shortness of breath, nausea, vomiting, diarrhea. Denies any acute issues, concerns or complaints to address on today's visit. Reports taking all medications as prescribed. No other needs identified at this time. Risks of environmental exposure to coronavirus discussed including: social distancing, hand hygiene, and limiting departures from the home for necessities only.  Reports understanding and willingness to comply.        Goals of care    ADVANCED DIRECTIVES  I initiated the process of advance care planning today and explained the importance of this process to Radha and Isak. At this time she is a full code. There are no Advanced directives in Epic nor are there any completed per Isak.  I introduced the concept of advance directives to the patient and Isak today as well. Then the patient received detailed information about the importance of designating a Health Care Power of  (HCPOA). She was also instructed to communicate with this person about their wishes for future healthcare, should she become sick and lose decision-making capacity.     At this point in time, the patient does have full decision-making capacity.  We discussed different extreme health states that she could experience, and reviewed what kind of medical care she would want in those " situations.  The patient communicated she is not ready to think about those things and would like for the forms to be left so she can review them with Isak.  Advanced Directives forms, HPOA and [place] forms left in the home today. Will review with patient on next visit 12/11/2020.  I spent a total of 30 minutes engaging the patient in this advance care planning discussion.             Review of Systems   Constitutional: Negative for activity change, appetite change, fatigue and unexpected weight change.   HENT: Negative for congestion, facial swelling, sinus pressure, sinus pain and sneezing.         Runny nose, clear nose    Eyes: Negative for visual disturbance.   Respiratory: Negative for cough, chest tightness, shortness of breath and wheezing.    Cardiovascular: Positive for leg swelling. Negative for chest pain and palpitations.   Gastrointestinal: Negative for abdominal pain, blood in stool, constipation, diarrhea, nausea and vomiting.        BM every other day    Genitourinary: Positive for enuresis. Negative for dysuria, flank pain, frequency, hematuria and pelvic pain.   Musculoskeletal: Positive for arthralgias, back pain and gait problem. Negative for joint swelling, myalgias, neck pain and neck stiffness.   Skin: Positive for wound. Negative for rash.   Neurological: Positive for tremors and weakness. Negative for dizziness, light-headedness and headaches.   Hematological: Negative for adenopathy. Does not bruise/bleed easily.   Psychiatric/Behavioral: Negative for dysphoric mood and sleep disturbance. The patient is not nervous/anxious today but does report getting anxious more frequently than she used to.               Assessments:  · Environmental: lives with spouse Isak in a single story home that is clean with adequate lighting and temperature  · Functional Status: can feed self finger foods but requires assistance with all other ADLs due to tremors from Parkinson's.  · Safety: fall  "risk  · Nutritional: has adequate access, reports "good" appetite  · Home Health/DME/Supplies: No current HH, recently discharged.  · DME: old wheelchair (requesting new one today), rollator, glucometer    History:  Past Medical History:   Diagnosis Date    Anemia     Atrial fibrillation     Chronic constipation     Deep vein thrombosis of left lower extremity     Diabetes mellitus     TYPE 2    Diverticulosis     Hypertension     Obesity     Parkinson disease     Peptic ulcer of stomach     Prolapse of female bladder, acquired     Rectocele     Uterine cancer 11-1-2011    STAGE 1-A GRADE 2     Family History   Problem Relation Age of Onset    Heart disease Mother     Diabetes Mother     Hypertension Father     Heart disease Sister     Diabetes Maternal Grandmother     Hypertension Maternal Grandmother     No Known Problems Brother     No Known Problems Daughter     No Known Problems Son     No Known Problems Maternal Aunt     No Known Problems Maternal Uncle     No Known Problems Paternal Aunt     No Known Problems Paternal Uncle     No Known Problems Maternal Grandfather     No Known Problems Paternal Grandmother     No Known Problems Paternal Grandfather     Breast cancer Neg Hx     Ovarian cancer Neg Hx     Uterine cancer Neg Hx     Colon cancer Neg Hx     Cancer Neg Hx     Cervical cancer Neg Hx     Endometrial cancer Neg Hx     Vaginal cancer Neg Hx     Colon polyps Neg Hx     Crohn's disease Neg Hx     Ulcerative colitis Neg Hx      Past Surgical History:   Procedure Laterality Date    COLONOSCOPY  prior to 2000    COLONOSCOPY N/A 9/28/2016    Procedure: COLONOSCOPY;  Surgeon: Baljinder Ospina MD;  Location[facility];  Service: Endoscopy;  Laterality: N/A;    FLEXIBLE SIGMOIDOSCOPY  06/05/2016    at [facility]- in media section: poor prep, diverticulosis noted with old clots, internal hemorrhoids otherwise normal findings- recommend outpatient colonoscopy    FOREARM " "SURGERY      right forearm surgery    HERNIA REPAIR      ROBOTIC ASST    HYSTERECTOMY  11/1/2001    [facility]/BSO--cancer    PELVIC AND PARA-AORTIC LYMPH NODE DISSECTION      NE REMOVAL OF OVARY/TUBE(S)      ROBOTIC ASST    TUBAL LIGATION      UPPER GASTROINTESTINAL ENDOSCOPY       Review of patient's allergies indicates:   Allergen Reactions    Glucophage  [metformin]      Other reaction(s): Diarrhea  Other reaction(s): Diarrhea    Sulfa (sulfonamide antibiotics) Itching     Other reaction(s): Itching  Other reaction(s): Itching       Medications:    Current Outpatient Medications:     ACETAMINOPHEN (TYLENOL ARTHRITIS ORAL), Take by mouth., Disp: , Rfl:     busPIRone (BUSPAR) 7.5 MG tablet, Take 1 tablet (7.5 mg total) by mouth 3 (three) times daily., Disp: 90 tablet, Rfl: 11    CARTIA  mg Cp24, Take one capsule every evening, Disp: , Rfl: 3    ELIQUIS 5 mg Tab, TK 1 T PO BID, Disp: , Rfl: 2    glimepiride (AMARYL) 4 MG tablet, TAKE 1 TABLET(4 MG) BY MOUTH DAILY WITH BREAKFAST, Disp: 90 tablet, Rfl: 3    lisinopriL (PRINIVIL,ZESTRIL) 20 MG tablet, 1 TABLET ONCE DAILY, Disp: 90 tablet, Rfl: 3    ofloxacin (OCUFLOX) 0.3 % ophthalmic solution, INT 1 GTT INTO OU QID UTD, Disp: , Rfl:     omeprazole (PRILOSEC) 40 MG capsule, Take 1 capsule (40 mg total) by mouth once daily., Disp: 30 capsule, Rfl: 11    pen needle, diabetic (BD ULTRA-FINE MINI PEN NEEDLE) 31 gauge x 3/16" Ndle, USE DAILY TO ADMINISTER INSULIN, Disp: 300 each, Rfl: 3    polyethylene glycol (GLYCOLAX) 17 gram/dose powder, TK 17 GRAMS PO QD UTD, Disp: , Rfl:     rasagiline (AZILECT) 0.5 MG Tab, Take 1 tablet (0.5 mg total) by mouth once daily., Disp: 30 tablet, Rfl: 2    TRUE METRIX GLUCOSE TEST STRIP Strp, Test glucose levels once daily for diabetes, Disp: 50 strip, Rfl: 3    insulin detemir U-100 (LEVEMIR FLEXTOUCH U-100 INSULN) 100 unit/mL (3 mL) InPn pen, Inject 20 Units into the skin every evening., Disp: 6 mL, Rfl: 0    " "ketorolac 0.5% (ACULAR) 0.5 % Drop, 1 DROP TO AFFECTED EYE QID FOR 15 DAYS, Disp: , Rfl:     prednisoLONE acetate (PRED FORTE) 1 % DrpS, , Disp: , Rfl:     24h Oral Morphine Equivalents (OME):  N/A    Objective:     Physical Exam:  Vitals:    10/30/20 1103   BP: (!) 150/82   Pulse: 80   Resp: 16   Temp: 97.8 °F (36.6 °C)   TempSrc: Temporal   SpO2: 98%   Weight: 74.8 kg (165 lb)   Height: 5' 8" (1.727 m)   PainSc: 0-No pain     Body mass index is 25.09 kg/m².    Physical Exam  Constitutional:       General: She is not in acute distress.     Appearance: She is well-developed. She is obese. She is not diaphoretic.   HENT:      Head: Normocephalic and atraumatic.      Right Ear: External ear normal.      Left Ear: External ear normal.   Eyes:      Pupils: Pupils are equal, round, and reactive to light.   Neck:      Musculoskeletal: Neck supple. No muscular tenderness.   Cardiovascular:      Rate and Rhythm: Normal rate. Rhythm irregular.      Heart sounds: No friction rub. No gallop.    Pulmonary:      Effort: Pulmonary effort is normal. No respiratory distress.      Breath sounds: Normal breath sounds. No wheezing.   Chest:      Chest wall: No tenderness.   Abdominal:      General: Bowel sounds are normal. There is no distension.      Palpations: Abdomen is soft.      Tenderness: There is no abdominal tenderness.   Musculoskeletal: Normal range of motion.         General: Swelling and deformity present. No tenderness.      Comments: Deformity noted to bilateral hands, L hand contracture worse   Trace pitting pedal edema to BLE   Skin:     General: Skin is warm and dry.      Capillary Refill: Capillary refill takes less than 2 seconds.      Findings: Lesion present. No bruising.      Comments: Stage 2 pressure ulcer to sacrum    Neurological:      Mental Status: She is alert and oriented to person, place, and time.      Sensory: No sensory deficit.      Motor: Weakness present.      Gait: Gait abnormal.      Comments: " Decreased strength RUE 4/5, LUE 3/5  BLE 3/5   Psychiatric:         Behavior: Behavior normal.         Thought Content: Thought content normal.         Judgment: Judgment normal.           Review of Symptoms    Symptom Assessment (ESAS 0-10 Scale)  Pain:  0  Dyspnea:  0  Anxiety:  4  Nausea:  0  Depression:  2  Anorexia:  0  Fatigue:  4  Insomnia:  2  Restlessness:  1  Agitation:  1     CAM / Delirium:  Negative  Constipation:  Positive  Diarrhea:  Negative    Bowel Management Plan (BMP):  No      Comments:  Normally has BM qod, denies incontinence          Performance Status:  50    ECOG Performance Status Grade:  3 - Confined to bed or chair 50% of waking hours    Living Arrangements:  Lives with spouse    Psychosocial/Cultural: Retired teacher    Spiritual:  F - Grace and Belief:  P[employer]alism  I - Importance:  Very important  C - Community:  Does not attend [employer] since Covid  A - Address in Care:  No issues identified/reported     Time-Based Charting:  Yes  Chart Review: 15 minutes  Face to Face: 20 minutes  Symptom Assessment: 10 minutes  Coordination of Care: 5 minutes  Advance Care Plannin minutes  Goals of Care: 10 minutes    Total Time Spent: 90 minutes      Advance Care Planning   Advance Directives:   Living Will: No    [place]: No    Do Not Resuscitate Status: No    Medical Power of : Yes (pt verbalizes her spouse Isak has HPOA)      Decision Making:  Patient answered questions and Family answered questions         Labs:  CBC:   WBC   Date Value Ref Range Status   2020 6.00 3.90 - 12.70 K/uL Final     MCV   Date Value Ref Range Status   2020 98 82 - 98 fL Final            Hematocrit   Date Value Ref Range Status   2020 43.5 37.0 - 48.5 % Final                       Albumin:   Albumin   Date Value Ref Range Status   2020 3.7 3.5 - 5.2 g/dL Final     Protein:   Total Protein   Date Value Ref Range Status   2020 7.5 6.0 - 8.4 g/dL Final       Radiology:  I have  reviewed all pertinent imaging results/findings within the past 24 hours.      Assessment:     1. Pressure injury of sacral region, stage 2    2. Parkinson disease    3. Palliative care encounter    4. Advanced directives, counseling/discussion    5. Need for assistance due to reduced mobility    6. Anxiety    7. Hypertension associated with diabetes        Plan:   Radha was seen today for establish care.    Diagnoses and all orders for this visit:    Pressure injury of sacral region, stage 2    Parkinson disease  -     Ambulatory referral/consult to [facility]    Palliative care encounter    Advanced directives, counseling/discussion    Need for assistance due to reduced mobility    Anxiety    Hypertension associated with diabetes      Problem List Items Addressed This Visit        Neuro    Parkinson disease     Followed by neurology, last visit > 1 year ago, diagnosed in 2014  - severe bradykinesia and resting tremors  - requires assistance with all ADLs and IADLs except feeding.   - not currently taking azilect due to s/e of stiffness   - instructed  to make f/u appt with neurology asap to discuss alternative treatment.            Psychiatric    Anxiety     Stable on Buspar.  Reports some anxiety due to not being as independent as she used to be.            Cardiac/Vascular    Hypertension associated with diabetes     BP elevated today, on Lisinopril.  Serial BP checks recommended to Isak.  Low salt diet discussed.  BG controlled.  ADA diet recommended.            Palliative Care    Advanced directives, counseling/discussion     Currently full code. No documents in Epic. Discussed today Advanced Directives with spouse and patient. HPOA, Living Will, [place] forms reviewed and left for review.            Other    Need for assistance due to reduced mobility     Active - secondary to Parkinson  - pt is wheelchair dependent  All care provided by spouse         Pressure injury of  sacral region, stage 2 - Primary     Active stage 2 pressure ulcer to sacral area  - partial thickness loss of dermis without slough, wound bed pink. No drainage or warmth noted  -  is cleaning wound daily and applying neosporin-encouraged barrier cream  - encouraged offloading and discussed adequate nutrition for healing  - will cont to monitor          Palliative care encounter     Currently full code. No documents in Epic. Discussed today Advanced Directives with spouse and patient. HPOA, Living Will, [place] forms reviewed and left for review.                 Were controlled substances prescribed?  No    > 50% of 90 min visit spent in chart review, face to face discussion of goals of care,  symptom assessment, coordination of care and emotional support. Topics discussed today: fall precautions and safety, Parkinson's, ADL assistance, anxiety, pressure ulcer.     An additional 30 minutes of the visit was spent in advanced care planning. Discussed palliative care, hospice and hospice benefits, La[place] form, ADs, and goals of care.    Follow Up Appointments:   Future Appointments   Date Time Provider Department Center   2/24/2021  1:30 PM Trev Mueller MD [facility] at [facility]   Verbal consent for visit obtained from patient and spouse today.    Signature:  Rosa Collins NP     Attestation:   Screening criteria to assess the level of the patient's risk for infection with COVID-19 as recommended by the CDC at the time of the above documented home visit concluded appropriateness to proceed. Universal precautions were maintained at all times, including provider wearing a mask and gloves during entire visit and use of 60% alcohol gel hand  immediately prior to entry and upon departure of patient's home as well as cleaning of equipment used in home visit with antibacterial/germicidal disposable wipes.

## 2020-11-02 PROBLEM — Z71.89 ADVANCED DIRECTIVES, COUNSELING/DISCUSSION: Status: ACTIVE | Noted: 2020-11-02

## 2020-11-02 PROBLEM — F41.9 ANXIETY: Status: ACTIVE | Noted: 2020-11-02

## 2020-11-02 PROBLEM — Z51.5 PALLIATIVE CARE ENCOUNTER: Status: ACTIVE | Noted: 2020-11-02

## 2020-11-02 NOTE — ASSESSMENT & PLAN NOTE
Currently full code. No documents in Epic. Discussed today Advanced Directives with spouse and patient. HPOA, Living Will, [place] forms reviewed and left for review.

## 2020-11-02 NOTE — PATIENT INSTRUCTIONS
Patient Instructions:  - [facility] Nurse Practitioner to schedule home follow-up visit with patient in 6-8 weeks or as needed.  - Continue all medications, treatments and therapies as ordered.   - Follow all instructions, recommendations as discussed.  - Maintain Safety Precautions at all times.  - Attend all medical appointments as scheduled.  - For worsening symptoms: call Primary Care Physician or Nurse Practitioner.  - For emergencies, call 911 or immediately report to the nearest emergency room.  - Limit Risks of environmental exposure to coronavirus/COVID-19 as discussed including: social distancing, hand hygiene, and limiting departures from the home for necessities only.       Fall Prevention  Falls often occur due to slipping, tripping or losing your balance. Millions of people fall every year and injure themselves. Here are ways to reduce your risk of falling again.  · Think about your fall, was there anything that caused your fall that can be fixed, removed, or replaced?  · Make your home safe by keeping walkways clear of objects you may trip over.  · Use non-slip pads under rugs. Do not use area rugs or small throw rugs.  · Use non-slip mats in bathtubs and showers.  · Install handrails and lights on staircases.  · Do not walk in poorly lit areas.  · Do not stand on chairs or wobbly ladders.  · Use caution when reaching overhead or looking upward. This position can cause a loss of balance.  · Be sure your shoes fit properly, have non-slip bottoms and are in good condition.   · Wear shoes both inside and out. Avoid going barefoot or wearing slippers.  · Be cautious when going up and down stairs, curbs, and when walking on uneven sidewalks.  · If your balance is poor, consider using a cane or walker.  · If your fall was related to alcohol use, stop or limit alcohol intake.   · If your fall was related to use of sleeping medicines, talk to your doctor about this. You may need to reduce your dosage at bedtime  if you awaken during the night to go to the bathroom.    · To reduce the need for nighttime bathroom trips:  ¨ Avoid drinking fluids for several hours before going to bed  ¨ Empty your bladder before going to bed  ¨ Men can keep a urinal at the bedside  · Stay as active as you can. Balance, flexibility, strength, and endurance all come from exercise. They all play a role in preventing falls. Ask your healthcare provider which types of activity are right for you.  · Get your vision checked on a regular basis.  · If you have pets, know where they are before you stand up or walk so you don't trip over them.  · Use night lights.  Date Last Reviewed: 11/5/2015  © 9716-7268 [employer]. [address] [place] [address], [place] [address]. All rights reserved. This information is not intended as a substitute for professional medical care. Always follow your healthcare professional's instructions.        Parkinsons Disease: Home Safety     Removing throw rugs can help prevent trips and falls.     As Parkinsons disease progresses, home safety will be an increasing concern. This page includes tips that can help make your daily life safer and easier. Your doctor may also recommend a therapist to advise you on the best ways to set up your home.  Setting up living spaces  Get help from family and friends to make these changes:  · Keep walkways open and free of clutter. Move phone and electrical cords out of the way. Remove throw rugs to prevent trips.  · Get a cordless or speaker phone. Program numbers for family and emergency services.  · Make sure rooms are well lit. Install nightlights along walkways.  · If freezing at doorways is a problem, consider placing lines of tape on the floor between rooms. Stepping over the tape may prompt you to keep moving.  Setting up the bathroom  Use the tips below to make changes to your bathroom. Medicare or insurance may help cover the costs of some of these items, depending on your  particular needs and plan.  · Have grab bars put in the shower or tub for support getting in and out.  · Install a hand-held showerhead for easier bathing.  · Raise the height of the toilet with a commode chair or elevated toilet seat.  · Use a rubber-backed bath mat to help prevent slips and falls.  · Buy a shower seat to make bathing safer and less tiring.     Preventing falls  Parkinsons symptoms make falls more likely. Safety improvements around the house can help. But if you begin having frequent falls, talk to your doctor. He or she may recommend physical therapy. This helps you learn the safest ways to move around. If needed, your therapist may also teach you how to use a cane or walker. Consider buying a life line so that if you do fall while you are alone, you'll have a way to get help.   Date Last Reviewed: 11/9/2015  © 5909-5920 [employer]. [address] [place] [address]. All rights reserved. This information is not intended as a substitute for professional medical care. Always follow your healthcare professional's instructions.        Exercise for Parkinson's Disease  These exercises can help strengthen your muscles and keep them loose and flexible. Ask your healthcare provider whether theyre right for you. Your healthcare provider or physical therapist may also suggest other exercises.  Do the exercises once a day at first, then build up to several times a day. Exercise slowly, and rest if you feel pain.              Body twist Seated march Back stretch   Body twist  Follow these steps:  · Sit in a chair, facing forward. Place your hands on your shoulders.  · Turn your head and body to the side as far as possible, as if you were trying to look behind you.  · Return to starting position, then turn to the other side.  · Repeat 10 times.  Seated march  Follow these steps:  · Sit in a chair, facing forward.  · Slowly lift one knee as high as you can, then lower your foot to the  floor.  · Do the same with your other leg.  · Repeat 10 times with each leg.  Back stretch  Follow these steps:  · Stand or sit with your back straight.  · Hold your arms in front of you. Put your hands and elbows together, hands pointing toward the ceiling.  · Move your arms apart as far as possible, pushing your shoulder blades together.  · Slowly move your hands back together.  · Repeat 10 times.  Date Last Reviewed: 10/11/2015  © 9071-8717 [employer]. [address] [place] [address], [place] [address]. All rights reserved. This information is not intended as a substitute for professional medical care. Always follow your healthcare professional's instructions.        Parkinsons Disease: Coping Tips for Caregivers  In the early stages of Parkinsons disease, your loved one can likely handle most tasks without help. But over time, you may find yourself taking on more and more responsibilities. Keep in mind that you cant take good care of someone else if you dont take care of yourself, too. So be sure to take breaks when you need them. Its not being selfish. Its vital.    Give yourself a break  Its OK to spend time outside your role as caregiver. It may not feel right at first. But even simple things can help ease stress. Try these tips:  · Go to a movie or concert.  · Have a meal with friends.  · Read a book or write in a journal.  · Take a walk.  · Pursue a hobby.  · Take a long bath.  If you feel depressed  Taking care of your loved one may leave you feeling frustrated, sad, or worn out. This isnt a sign youre doing something wrong. Its completely normal. However, be on the lookout for signs of depression. These include feeling sad, guilty, tired, or helpless most of the time. Other signs include trouble sleeping, and loss of interest in activities or food. If you notice these signs in yourself, or in your loved one, talk to a doctor. Depression can and should be treated.  Accept help  Caring for  someone with Parkinsons disease takes more than 1 person. So learn to accept help when its offered. And be willing to ask for help when you need it. People who care about you and your loved one really do want to help. You can also talk to the doctor or a  about options for respite care (temporary help). This can range from adult day care to a home health aide who cares for your loved one a few times each month.  Date Last Reviewed: 11/9/2015  © 3191-2552 [employer]. [address] [place] [address]. All rights reserved. This information is not intended as a substitute for professional medical care. Always follow your healthcare professional's instructions.

## 2020-11-02 NOTE — ASSESSMENT & PLAN NOTE
BP elevated today, on Lisinopril.  Serial BP checks recommended to Isak.  Low salt diet discussed.  BG controlled.  ADA diet recommended.

## 2020-11-02 NOTE — ASSESSMENT & PLAN NOTE
Active stage 2 pressure ulcer to sacral area  - partial thickness loss of dermis without slough, wound bed pink. No drainage or warmth noted  -  is cleaning wound daily and applying neosporin-encouraged barrier cream  - encouraged offloading and discussed adequate nutrition for healing  - will cont to monitor

## 2020-11-13 DIAGNOSIS — E11.3393 CONTROLLED TYPE 2 DIABETES MELLITUS WITH BOTH EYES AFFECTED BY MODERATE NONPROLIFERATIVE RETINOPATHY WITHOUT MACULAR EDEMA, WITH LONG-TERM CURRENT USE OF INSULIN: ICD-10-CM

## 2020-11-13 DIAGNOSIS — Z79.4 CONTROLLED TYPE 2 DIABETES MELLITUS WITH BOTH EYES AFFECTED BY MODERATE NONPROLIFERATIVE RETINOPATHY WITHOUT MACULAR EDEMA, WITH LONG-TERM CURRENT USE OF INSULIN: ICD-10-CM

## 2020-11-13 RX ORDER — GLIMEPIRIDE 4 MG/1
TABLET ORAL
Qty: 90 TABLET | Refills: 3 | Status: SHIPPED | OUTPATIENT
Start: 2020-11-13 | End: 2020-12-16 | Stop reason: SDUPTHER

## 2020-11-16 ENCOUNTER — PATIENT MESSAGE (OUTPATIENT)
Dept: HOME HEALTH SERVICES | Facility: CLINIC | Age: 80
End: 2020-11-16

## 2020-11-16 ENCOUNTER — TELEPHONE (OUTPATIENT)
Dept: PRIMARY CARE CLINIC | Facility: CLINIC | Age: 80
End: 2020-11-16

## 2020-11-16 RX ORDER — CALCIUM CITRATE/VITAMIN D3 200MG-6.25
TABLET ORAL
Qty: 200 STRIP | Refills: 3 | Status: SHIPPED | OUTPATIENT
Start: 2020-11-16 | End: 2021-11-02 | Stop reason: SDUPTHER

## 2020-11-16 RX ORDER — PEN NEEDLE, DIABETIC 30 GX3/16"
NEEDLE, DISPOSABLE MISCELLANEOUS
Qty: 300 EACH | Refills: 3 | Status: SHIPPED | OUTPATIENT
Start: 2020-11-16 | End: 2022-01-11 | Stop reason: SDUPTHER

## 2020-11-16 RX ORDER — PEN NEEDLE, DIABETIC 30 GX3/16"
NEEDLE, DISPOSABLE MISCELLANEOUS
Qty: 300 EACH | Refills: 3 | Status: SHIPPED | OUTPATIENT
Start: 2020-11-16 | End: 2020-11-16

## 2020-11-25 ENCOUNTER — OFFICE VISIT (OUTPATIENT)
Dept: FAMILY MEDICINE | Facility: CLINIC | Age: 80
End: 2020-11-25
Payer: MEDICARE

## 2020-11-25 DIAGNOSIS — R53.81 PHYSICAL DEBILITY: Primary | ICD-10-CM

## 2020-11-25 DIAGNOSIS — G20.A1 PARKINSON DISEASE: ICD-10-CM

## 2020-11-25 PROCEDURE — 99213 OFFICE O/P EST LOW 20 MIN: CPT | Mod: 95,,, | Performed by: STUDENT IN AN ORGANIZED HEALTH CARE EDUCATION/TRAINING PROGRAM

## 2020-11-25 PROCEDURE — 1159F PR MEDICATION LIST DOCUMENTED IN MEDICAL RECORD: ICD-10-PCS | Mod: 95,,, | Performed by: STUDENT IN AN ORGANIZED HEALTH CARE EDUCATION/TRAINING PROGRAM

## 2020-11-25 PROCEDURE — 1159F MED LIST DOCD IN RCRD: CPT | Mod: 95,,, | Performed by: STUDENT IN AN ORGANIZED HEALTH CARE EDUCATION/TRAINING PROGRAM

## 2020-11-25 PROCEDURE — 99213 PR OFFICE/OUTPT VISIT, EST, LEVL III, 20-29 MIN: ICD-10-PCS | Mod: 95,,, | Performed by: STUDENT IN AN ORGANIZED HEALTH CARE EDUCATION/TRAINING PROGRAM

## 2020-11-25 NOTE — PROGRESS NOTES
"The patient location is: [place]  The chief complaint leading to consultation is: "to discuss medications"    Visit type: audiovisual    Face to Face time with patient: 10 mins  20 minutes of total time spent on the encounter, which includes face to face time and non-face to face time preparing to see the patient (eg, review of tests), Obtaining and/or reviewing separately obtained history, Documenting clinical information in the electronic or other health record, Independently interpreting results (not separately reported) and communicating results to the patient/family/caregiver, or Care coordination (not separately reported).         Each patient to whom he or she provides medical services by telemedicine is:  (1) informed of the relationship between the physician and patient and the respective role of any other health care provider with respect to management of the patient; and (2) notified that he or she may decline to receive medical services by telemedicine and may withdraw from such care at any time.    Notes:     [facility] CLINIC NOTE    Patient Name: Radha Alas  YOB: 1940    PRESENTING HISTORY   Chief Complaint: Can't walk     History of Present Illness:  Ms. Radha Alas is a 80 y.o. female with PD.     She follows with [facility] and has recently had home visits with nurse practitioner for that clinic and palliative care. She has known pressure sores.     On rasagiline, she has been intermittently on this medicine and follows with neurology.   She says she is taking this as prescribed.     History of physical therapy.   She is unsure if this was helpful and has home therapy exercises to perform.   As far as I can tell, not currently getting home health.      On review of records she has had gradual decline in functional status over years.   No acute changes.   Diffuse weakness.     Review of Systems   Neurological: Positive for weakness.         PAST HISTORY: "     Past Medical History:   Diagnosis Date    Anemia     Atrial fibrillation     Chronic constipation     Deep vein thrombosis of left lower extremity     Diabetes mellitus     TYPE 2    Diverticulosis     Hypertension     Obesity     Parkinson disease     Peptic ulcer of stomach     Prolapse of female bladder, acquired     Rectocele     Uterine cancer 11-1-2011    STAGE 1-A GRADE 2       Past Surgical History:   Procedure Laterality Date    COLONOSCOPY  prior to 2000    COLONOSCOPY N/A 9/28/2016    Procedure: COLONOSCOPY;  Surgeon: Baljinder Ospina MD;  Location: [facility];  Service: Endoscopy;  Laterality: N/A;    FLEXIBLE SIGMOIDOSCOPY  06/05/2016    at [facility]- in media section: poor prep, diverticulosis noted with old clots, internal hemorrhoids otherwise normal findings- recommend outpatient colonoscopy    FOREARM SURGERY      right forearm surgery    HERNIA REPAIR      ROBOTIC ASST    HYSTERECTOMY  11/1/2001    [facility]--cancer    PELVIC AND PARA-AORTIC LYMPH NODE DISSECTION      AK REMOVAL OF OVARY/TUBE(S)      ROBOTIC ASST    TUBAL LIGATION      UPPER GASTROINTESTINAL ENDOSCOPY         Family History   Problem Relation Age of Onset    Heart disease Mother     Diabetes Mother     Hypertension Father     Heart disease Sister     Diabetes Maternal Grandmother     Hypertension Maternal Grandmother     No Known Problems Brother     No Known Problems Daughter     No Known Problems Son     No Known Problems Maternal Aunt     No Known Problems Maternal Uncle     No Known Problems Paternal Aunt     No Known Problems Paternal Uncle     No Known Problems Maternal Grandfather     No Known Problems Paternal Grandmother     No Known Problems Paternal Grandfather     Breast cancer Neg Hx     Ovarian cancer Neg Hx     Uterine cancer Neg Hx     Colon cancer Neg Hx     Cancer Neg Hx     Cervical cancer Neg Hx     Endometrial cancer Neg Hx     Vaginal cancer Neg Hx     Colon polyps  Neg Hx     Crohn's disease Neg Hx     Ulcerative colitis Neg Hx        Social History     Socioeconomic History    Marital status:      Spouse name: Isak Alas    Number of children: Not on file    Years of education: Not on file    Highest education level: Not on file   Occupational History    Occupation: retired teacher   Social Needs    Financial resource strain: Not hard at all    Food insecurity     Worry: Never true     Inability: Never true    Transportation needs     Medical: No     Non-medical: No   Tobacco Use    Smoking status: Never Smoker    Smokeless tobacco: Never Used   Substance and Sexual Activity    Alcohol use: No     Alcohol/week: 0.0 standard drinks     Frequency: Never     Binge frequency: Never    Drug use: No    Sexual activity: Not Currently   Lifestyle    Physical activity     Days per week: 0 days     Minutes per session: 10 min    Stress: To some extent   Relationships    Social connections     Talks on phone: More than three times a week     Gets together: Once a week     Attends [employer]ars[employer] service: Not on file     Active member of club or organization: Yes     Attends meetings of clubs or organizations: More than 4 times per year     Relationship status:    Other Topics Concern    Not on file   Social History Narrative    Not on file       MEDICATIONS & ALLERGIES:     Current Outpatient Medications on File Prior to Visit   Medication Sig    ACETAMINOPHEN (TYLENOL ARTHRITIS ORAL) Take by mouth.    busPIRone (BUSPAR) 7.5 MG tablet Take 1 tablet (7.5 mg total) by mouth 3 (three) times daily.    CARTIA  mg Cp24 Take one capsule every evening    ELIQUIS 5 mg Tab TK 1 T PO BID    glimepiride (AMARYL) 4 MG tablet TAKE 1 TABLET(4 MG) BY MOUTH DAILY WITH BREAKFAST    insulin detemir U-100 (LEVEMIR FLEXTOUCH U-100 INSULN) 100 unit/mL (3 mL) InPn pen Inject 20 Units into the skin every evening.    ketorolac 0.5% (ACULAR) 0.5 % Drop 1 DROP TO AFFECTED  "EYE QID FOR 15 DAYS    lisinopriL (PRINIVIL,ZESTRIL) 20 MG tablet 1 TABLET ONCE DAILY    ofloxacin (OCUFLOX) 0.3 % ophthalmic solution INT 1 GTT INTO OU QID UTD    omeprazole (PRILOSEC) 40 MG capsule Take 1 capsule (40 mg total) by mouth once daily.    pen needle, diabetic (BD ULTRA-FINE MINI PEN NEEDLE) 31 gauge x 3/16" Ndle USE DAILY TO ADMINISTER INSULIN    polyethylene glycol (GLYCOLAX) 17 gram/dose powder TK 17 GRAMS PO QD UTD    prednisoLONE acetate (PRED FORTE) 1 % DrpS     rasagiline (AZILECT) 0.5 MG Tab Take 1 tablet (0.5 mg total) by mouth once daily.    TRUE METRIX GLUCOSE TEST STRIP Strp Test glucose levels 2-3 daily for diabetes     No current facility-administered medications on file prior to visit.        Review of patient's allergies indicates:   Allergen Reactions    Glucophage  [metformin]      Other reaction(s): Diarrhea  Other reaction(s): Diarrhea    Sulfa (sulfonamide antibiotics) Itching     Other reaction(s): Itching  Other reaction(s): Itching       OBJECTIVE:        Physical Exam   Constitutional:   No distress.    Neurological:   Coarse resting tremor noted.   Awake and alert. Interacting normally.        ASSESSMENT & PLAN:   80 F with chronic functional decline.     Will refer for HH to see if PT or OT is able to help her move better. [facility] up in [facility].     Physical debility  -     Ambulatory referral/consult to Home Health; Future; Expected date: 12/02/2020    Parkinson disease  -     Ambulatory referral/consult to Home Health; Future; Expected date: 12/02/2020     Prabhu Clark MD           "

## 2020-12-11 ENCOUNTER — CARE AT HOME (OUTPATIENT)
Dept: HOME HEALTH SERVICES | Facility: CLINIC | Age: 80
End: 2020-12-11
Payer: MEDICARE

## 2020-12-11 DIAGNOSIS — Z78.9 IMPAIRED MOBILITY AND ACTIVITIES OF DAILY LIVING: ICD-10-CM

## 2020-12-11 DIAGNOSIS — M15.9 PRIMARY OSTEOARTHRITIS INVOLVING MULTIPLE JOINTS: ICD-10-CM

## 2020-12-11 DIAGNOSIS — Z91.81 HISTORY OF RECENT FALL: ICD-10-CM

## 2020-12-11 DIAGNOSIS — Z51.5 PALLIATIVE CARE ENCOUNTER: Primary | ICD-10-CM

## 2020-12-11 DIAGNOSIS — I15.2 HYPERTENSION ASSOCIATED WITH DIABETES: ICD-10-CM

## 2020-12-11 DIAGNOSIS — Z79.4 TYPE 2 DIABETES MELLITUS WITH DIABETIC NEUROPATHY, WITH LONG-TERM CURRENT USE OF INSULIN: ICD-10-CM

## 2020-12-11 DIAGNOSIS — N39.46 MIXED URGE AND STRESS INCONTINENCE: ICD-10-CM

## 2020-12-11 DIAGNOSIS — G20.A1 PARKINSON DISEASE: ICD-10-CM

## 2020-12-11 DIAGNOSIS — F41.9 ANXIETY: ICD-10-CM

## 2020-12-11 DIAGNOSIS — E11.40 TYPE 2 DIABETES MELLITUS WITH DIABETIC NEUROPATHY, WITH LONG-TERM CURRENT USE OF INSULIN: ICD-10-CM

## 2020-12-11 DIAGNOSIS — Z74.09 IMPAIRED MOBILITY AND ACTIVITIES OF DAILY LIVING: ICD-10-CM

## 2020-12-11 DIAGNOSIS — E11.59 HYPERTENSION ASSOCIATED WITH DIABETES: ICD-10-CM

## 2020-12-11 DIAGNOSIS — K59.09 CHRONIC CONSTIPATION: ICD-10-CM

## 2020-12-11 PROCEDURE — 99350 HOME/RES VST EST HIGH MDM 60: CPT | Mod: S$GLB,,, | Performed by: NURSE PRACTITIONER

## 2020-12-11 PROCEDURE — 3074F PR MOST RECENT SYSTOLIC BLOOD PRESSURE < 130 MM HG: ICD-10-PCS | Mod: CPTII,S$GLB,, | Performed by: NURSE PRACTITIONER

## 2020-12-11 PROCEDURE — 3074F SYST BP LT 130 MM HG: CPT | Mod: CPTII,S$GLB,, | Performed by: NURSE PRACTITIONER

## 2020-12-11 PROCEDURE — 3078F PR MOST RECENT DIASTOLIC BLOOD PRESSURE < 80 MM HG: ICD-10-PCS | Mod: CPTII,S$GLB,, | Performed by: NURSE PRACTITIONER

## 2020-12-11 PROCEDURE — 1159F PR MEDICATION LIST DOCUMENTED IN MEDICAL RECORD: ICD-10-PCS | Mod: S$GLB,,, | Performed by: NURSE PRACTITIONER

## 2020-12-11 PROCEDURE — 3078F DIAST BP <80 MM HG: CPT | Mod: CPTII,S$GLB,, | Performed by: NURSE PRACTITIONER

## 2020-12-11 PROCEDURE — 1159F MED LIST DOCD IN RCRD: CPT | Mod: S$GLB,,, | Performed by: NURSE PRACTITIONER

## 2020-12-11 PROCEDURE — 99350 PR HOME VISIT,ESTAB PATIENT,LEVEL IV: ICD-10-PCS | Mod: S$GLB,,, | Performed by: NURSE PRACTITIONER

## 2020-12-14 VITALS
TEMPERATURE: 98 F | SYSTOLIC BLOOD PRESSURE: 128 MMHG | HEART RATE: 74 BPM | RESPIRATION RATE: 16 BRPM | OXYGEN SATURATION: 99 % | DIASTOLIC BLOOD PRESSURE: 72 MMHG

## 2020-12-14 NOTE — PROGRESS NOTES
"[facility]  Palliative Care Home Visit    Visit Date: 12/11/2020  Encounter Provider: Rosa Collins NP  PCP:  Shahnaz Iqbal NP    Subjective:      Patient ID: Radha Alas is a 80 y.o. female.    Consult Requested By:  Shahnaz Iqbal NP  Reason for Consult:  Palliative Care    Radha is being seen at home due to physical debility that presents a taxing effort to leave the home, to mitigate high risk of hospital readmission and/or due to the limited availability of reliable or safe options for transportation to the point of access to the provider. Prior to treatment on this visit the chart was reviewed and patient consent was obtained.      Chief Complaint: Follow-up and Tremors    Radha is an 80 year old female with parkinson disease (2014), T2DM, a-fib, htn, hld, hx of dvt, chronic constipation, GERD, osteoarthritis and prolapse bladder.      Radha is being seen today to for a follow up Palliative Care visit. With this visit today Radha is found at home with spouse Isak present. She is found sitting in her recliner chair AAO x 3, pleasant, slow to response and with profound tremors noted in both arms/hands. She states that she has been doing "fairly well" but did have a fall recently. Isak reports that she slid out of her chair but had no injury. Radha gets upset today stating "I can walk but he won't let me". Isak responds gently to her that she is too weak to walk at this time. PT is working with patient. Radha also complains of OA in her knees and back which limit her mobility as well.     Radha is not taking rasagiline daily as recommended by Neurology because she reports it causes stiffness and makes it difficult for her to move around. Last dose 1 month ago. She will take it if she's shaking too much. She mentions that some days she wants to stop all medications because she doesn't like the side effects. Isak administers all medications daily and reminds her that her tremors will worsen if she stops her " medications. Isak reports that her anxiety is worse on some days more than others but that Buspar is helping.      Isak and Radha report that her pressure ulcer on her sacrum is currently healed. Patient is very sedentary and wears incontinence briefs. Encouraged offloading and meticulous perineal care to promote skin integrity.    Fall precautions and safety discussed today due to profound weakness and immobility due to tremors.      Isak checks Radha's BG daily and reports stable blood sugar levels. He administers Omaa's insulin without difficulty. Patient doesn't follow and ADA diet.        VSS. Denies fever, chest pain, shortness of breath, nausea, vomiting, diarrhea. Denies any acute issues, concerns or complaints to address on today's visit. Reports taking all medications as prescribed. No other needs identified at this time. Risks of environmental exposure to coronavirus discussed including: social distancing, hand hygiene, and limiting departures from the home for necessities only.  Reports understanding and willingness to comply.          Goals of care discussed today. Radha wishes to be a full code and to be cared for at home by spouse. Isak agrees that the best choice is for Radha to be cared for at home. Advanced directives were discussed on last visit and forms were left for completion at that time. Isak has not completed yet.    Review of Systems   Constitutional: Negative for activity change, appetite change, fatigue and unexpected weight change.   HENT: Negative for congestion, facial swelling, sinus pressure, sinus pain and sneezing.         Runny nose, clear nose    Eyes: Negative for visual disturbance.   Respiratory: Negative for cough, chest tightness, shortness of breath and wheezing.    Cardiovascular: Positive for leg swelling. Negative for chest pain and palpitations.   Gastrointestinal: Negative for abdominal pain, blood in stool, constipation, diarrhea, nausea and vomiting.        BM nearly  "daily.    Genitourinary: Positive for enuresis. Negative for dysuria, flank pain, frequency, hematuria and pelvic pain.   Musculoskeletal: Positive for arthralgias, back pain and gait problem. Negative for joint swelling, myalgias, neck pain and neck stiffness.   Skin: Positive for wound. Negative for rash.   Neurological: Positive for tremors and weakness. Negative for dizziness, light-headedness and headaches.   Hematological: Negative for adenopathy. Does not bruise/bleed easily.   Psychiatric/Behavioral: Negative for dysphoric mood and sleep disturbance. The patient is not nervous/anxious today but does report getting anxious more frequently than she used to.     Assessments:  · Environmental: lives with spouse Isak in a single story home that is clean with adequate lighting and temperature  · Functional Status: can feed self finger foods but requires assistance with all other ADLs due to tremors from Parkinson's.  · Safety: fall risk, recently slid out of chair  · Nutritional: has adequate access, reports "good" appetite  · Home Health/DME/Supplies: [facility].DME: wheelchair, rollator, glucometer     History:  Past Medical History:   Diagnosis Date    Anemia     Atrial fibrillation     Chronic constipation     Deep vein thrombosis of left lower extremity     Diabetes mellitus     TYPE 2    Diverticulosis     Hypertension     Obesity     Parkinson disease     Peptic ulcer of stomach     Prolapse of female bladder, acquired     Rectocele     Uterine cancer 11-1-2011    STAGE 1-A GRADE 2     Family History   Problem Relation Age of Onset    Heart disease Mother     Diabetes Mother     Hypertension Father     Heart disease Sister     Diabetes Maternal Grandmother     Hypertension Maternal Grandmother     No Known Problems Brother     No Known Problems Daughter     No Known Problems Son     No Known Problems Maternal Aunt     No Known Problems Maternal Uncle     No Known Problems Paternal " Aunt     No Known Problems Paternal Uncle     No Known Problems Maternal Grandfather     No Known Problems Paternal Grandmother     No Known Problems Paternal Grandfather     Breast cancer Neg Hx     Ovarian cancer Neg Hx     Uterine cancer Neg Hx     Colon cancer Neg Hx     Cancer Neg Hx     Cervical cancer Neg Hx     Endometrial cancer Neg Hx     Vaginal cancer Neg Hx     Colon polyps Neg Hx     Crohn's disease Neg Hx     Ulcerative colitis Neg Hx      Past Surgical History:   Procedure Laterality Date    COLONOSCOPY  prior to 2000    COLONOSCOPY N/A 9/28/2016    Procedure: COLONOSCOPY;  Surgeon: Baljinder Ospina MD;  Location: [facility];  Service: Endoscopy;  Laterality: N/A;    FLEXIBLE SIGMOIDOSCOPY  06/05/2016    at [facility]- in media section: poor prep, diverticulosis noted with old clots, internal hemorrhoids otherwise normal findings- recommend outpatient colonoscopy    FOREARM SURGERY      right forearm surgery    HERNIA REPAIR      ROBOTIC ASST    HYSTERECTOMY  11/1/2001    [facility]/BSO--cancer    PELVIC AND PARA-AORTIC LYMPH NODE DISSECTION      TX REMOVAL OF OVARY/TUBE(S)      ROBOTIC ASST    TUBAL LIGATION      UPPER GASTROINTESTINAL ENDOSCOPY       Review of patient's allergies indicates:   Allergen Reactions    Glucophage  [metformin]      Other reaction(s): Diarrhea  Other reaction(s): Diarrhea    Sulfa (sulfonamide antibiotics) Itching     Other reaction(s): Itching  Other reaction(s): Itching       Medications:    Current Outpatient Medications:     ACETAMINOPHEN (TYLENOL ARTHRITIS ORAL), Take by mouth., Disp: , Rfl:     busPIRone (BUSPAR) 7.5 MG tablet, Take 1 tablet (7.5 mg total) by mouth 3 (three) times daily., Disp: 90 tablet, Rfl: 11    CARTIA  mg Cp24, Take one capsule every evening, Disp: , Rfl: 3    ELIQUIS 5 mg Tab, TK 1 T PO BID, Disp: , Rfl: 2    glimepiride (AMARYL) 4 MG tablet, TAKE 1 TABLET(4 MG) BY MOUTH DAILY WITH BREAKFAST, Disp: 90 tablet, Rfl: 3     "insulin detemir U-100 (LEVEMIR FLEXTOUCH U-100 INSULN) 100 unit/mL (3 mL) InPn pen, Inject 20 Units into the skin every evening., Disp: 6 mL, Rfl: 0    ketorolac 0.5% (ACULAR) 0.5 % Drop, 1 DROP TO AFFECTED EYE QID FOR 15 DAYS, Disp: , Rfl:     lisinopriL (PRINIVIL,ZESTRIL) 20 MG tablet, 1 TABLET ONCE DAILY, Disp: 90 tablet, Rfl: 3    ofloxacin (OCUFLOX) 0.3 % ophthalmic solution, INT 1 GTT INTO OU QID UTD, Disp: , Rfl:     omeprazole (PRILOSEC) 40 MG capsule, Take 1 capsule (40 mg total) by mouth once daily., Disp: 30 capsule, Rfl: 11    pen needle, diabetic (BD ULTRA-FINE MINI PEN NEEDLE) 31 gauge x 3/16" Ndle, USE DAILY TO ADMINISTER INSULIN, Disp: 300 each, Rfl: 3    polyethylene glycol (GLYCOLAX) 17 gram/dose powder, TK 17 GRAMS PO QD UTD, Disp: , Rfl:     prednisoLONE acetate (PRED FORTE) 1 % DrpS, , Disp: , Rfl:     rasagiline (AZILECT) 0.5 MG Tab, Take 1 tablet (0.5 mg total) by mouth once daily., Disp: 30 tablet, Rfl: 2    TRUE METRIX GLUCOSE TEST STRIP Strp, Test glucose levels 2-3 daily for diabetes, Disp: 200 strip, Rfl: 3    24h Oral Morphine Equivalents (OME):  N/A    Objective:     Physical Exam:  Vitals:    12/11/20 1200   BP: 128/72   Pulse: 74   Resp: 16   Temp: 97.9 °F (36.6 °C)   TempSrc: Temporal   SpO2: 99%   PainSc: 0-No pain     There is no height or weight on file to calculate BMI.    Physical Exam  Constitutional:       General: She is not in acute distress.     Appearance: She is well-developed. She is obese. She is not diaphoretic.   HENT:      Head: Normocephalic and atraumatic.      Right Ear: External ear normal.      Left Ear: External ear normal.   Eyes:      Pupils: Pupils are equal, round, and reactive to light.   Neck:      Musculoskeletal: Neck supple. No muscular tenderness.   Cardiovascular:      Rate and Rhythm: Normal rate. Rhythm irregular.      Heart sounds: No friction rub. No gallop.    Pulmonary:      Effort: Pulmonary effort is normal. No respiratory distress. "      Breath sounds: Normal breath sounds. No wheezing.   Chest:      Chest wall: No tenderness.   Abdominal:      General: Bowel sounds are normal. There is no distension.      Palpations: Abdomen is soft.      Tenderness: There is no abdominal tenderness.   Musculoskeletal: Normal range of motion.         General: Swelling and deformity present. No tenderness.      Comments: Deformity noted to bilateral hands, L hand contracture worse   Trace pitting pedal edema to BLE   Skin:     General: Skin is warm and dry.      Capillary Refill: Capillary refill takes less than 2 seconds.      Findings:  No bruising. No lesion present.    Neurological:      Mental Status: She is alert and oriented to person, place, and time.      Sensory: No sensory deficit.      Motor: Weakness present.      Gait: Gait abnormal.      Comments: Decreased strength RUE 4/5, LUE 3/5  BLE 3/5   Psychiatric:         Behavior: Behavior normal.         Thought Content: Thought content normal.         Judgment: Judgment normal.            Review of Symptoms     Symptom Assessment (ESAS 0-10 Scale)  Pain:  0  Dyspnea:  0  Anxiety:  4  Nausea:  0  Depression:  2  Anorexia:  0  Fatigue:  4  Insomnia:  2  Restlessness:  1  Agitation:  1      CAM / Delirium:  Negative  Constipation:  Positive  Diarrhea:  Negative     Bowel Management Plan (BMP):  No       Comments:  Normally has BM qd- qod, incontinence reported due to weakness and inability to get to toilet in time frequently        Performance Status:  50     ECOG Performance Status Grade:  3 - Confined to bed or chair 50% of waking hours     Living Arrangements:  Lives with spouse     Psychosocial/Cultural: Retired teacher     Spiritual:  F - Grace and Belief:  Yazdanism  I - Importance:  Very important  C - Community:  Does not attend Jainism since Covid  A - Address in Care:  No issues identified/reported     Time-Based Charting:  Yes  Chart Review: 10 minutes  Face to Face: 20 minutes  Symptom  Assessment: 10 minutes  Coordination of Care: 5 minutes  Advance Care Plannin minutes  Goals of Care: 10 minutes    Total Time Spent: 60 minutes        Advance Care Planning   Advance Directives:   Living Will: No    [place]: No    Do Not Resuscitate Status: No    Medical Power of : Yes (pt verbalizes her spouse Isak has HPOA)       Decision Making:  Patient answered questions and Family answered questions         Labs:  CBC:   WBC   Date Value Ref Range Status   2020 6.00 3.90 - 12.70 K/uL Final     MCV   Date Value Ref Range Status   2020 98 82 - 98 fL Final            Hematocrit   Date Value Ref Range Status   2020 43.5 37.0 - 48.5 % Final                       Albumin:   Albumin   Date Value Ref Range Status   2020 3.7 3.5 - 5.2 g/dL Final     Protein:   Total Protein   Date Value Ref Range Status   2020 7.5 6.0 - 8.4 g/dL Final       Radiology:  I have reviewed all pertinent imaging results/findings within the past 24 hours.      Assessment:     1. Palliative care encounter    2. History of recent fall    3. Primary osteoarthritis involving multiple joints    4. Chronic constipation    5. Type 2 diabetes mellitus with diabetic neuropathy, with long-term current use of insulin    6. Mixed urge and stress incontinence    7. Hypertension associated with diabetes    8. Anxiety    9. Parkinson disease        Plan:   Radha was seen today for follow-up and tremors.    Diagnoses and all orders for this visit:    Palliative care encounter    History of recent fall    Primary osteoarthritis involving multiple joints    Chronic constipation    Type 2 diabetes mellitus with diabetic neuropathy, with long-term current use of insulin    Mixed urge and stress incontinence    Hypertension associated with diabetes    Anxiety    Parkinson disease      Problem List Items Addressed This Visit        Neuro    Parkinson disease     Chronic.  Followed by neurology, last visit > 1 year ago,  diagnosed in 2014  - severe bradykinesia and resting tremors  - requires assistance with all ADLs and IADLs except feeding.   - not currently taking azilect due to s/e of stiffness   - instructed  to make f/u appt with neurology asap to discuss alternative treatment.            Psychiatric    Anxiety     Stable on Buspar.  Reports some anxiety due to not being as independent as she used to be.  Emotional support provided.            Cardiac/Vascular    Hypertension associated with diabetes     BP stable today, on Lisinopril.  Serial BP checks recommended to Isak.  Low salt diet discussed.  BG controlled.  ADA diet recommended.            Renal/    Mixed urge and stress incontinence     Chronic  - complicated by Parkinson, wears adult diapers  - stable, cont as now   Discussed meticulous perineal care to prevent UTI and preserve skin integrity            Endocrine    Type 2 diabetes mellitus with diabetic neuropathy, with long-term current use of insulin     Chronic and stable.  - DM well controlled, A1c 6.1  - fbg , ppg 150-180  - insulin on ss, administered by   - stable, cont as now   Encouraged to follow ADA diet            GI    Chronic constipation     Currently well controlled  - BM almost daily  - stable, cont as now   Miralax prn            Orthopedic    Primary osteoarthritis involving multiple joints     Chronic.  - mostly affecting bilateral knees and back, limiting mobility in addition to Parkinsons.  - takes acetaminophen 500mg in the am and 1000mg in the pm  - pain currently 0/10  - cont as now             Other    Palliative care encounter - Primary     Currently full code. No documents in Epic. Discussed and reviewed Advanced Directives with spouse and patient on last visit.  HPOA, Living Will, [place] forms left for completion on last visit, spouse has not completed yet.          History of recent fall     No injury reported. Spouse reports patient slid out of chair.  Patient  "gets upset today reporting "I can walk but he won't let me".  She is clearly very weak and spouse reminds her she cannot walk.  Currently has PT in the home.  Fall precautions and safety advised.                  Were controlled substances prescribed?  No    > 50% of 60 min visit spent in chart review, face to face discussion of goals of care,  symptom assessment, coordination of care and emotional support. Topics discussed today: fall precautions and safety, Parkinson's, ADL assistance, anxiety, maintaining skin integrity, OA, DM.    Follow Up Appointments:   Future Appointments   Date Time Provider Department Center   2/24/2021  1:30 PM [facility]   Verbal consent for visit obtained from patient and spouse Isak today.     Signature:  Rosa Collins NP     Attestation:   Screening criteria to assess the level of the patient's risk for infection with COVID-19 as recommended by the CDC at the time of the above documented home visit concluded appropriateness to proceed. Universal precautions were maintained at all times, including provider wearing a mask and gloves during entire visit and use of 60% alcohol gel hand  immediately prior to entry and upon departure of patient's home as well as cleaning of equipment used in home visit with antibacterial/germicidal disposable wipes.      "

## 2020-12-15 PROBLEM — Z91.81 HISTORY OF RECENT FALL: Status: ACTIVE | Noted: 2020-12-15

## 2020-12-15 PROBLEM — Z78.9 IMPAIRED MOBILITY AND ACTIVITIES OF DAILY LIVING: Status: ACTIVE | Noted: 2020-12-15

## 2020-12-15 PROBLEM — Z74.09 IMPAIRED MOBILITY AND ACTIVITIES OF DAILY LIVING: Status: ACTIVE | Noted: 2020-12-15

## 2020-12-15 NOTE — ASSESSMENT & PLAN NOTE
Chronic and stable.  - DM well controlled, A1c 6.1  - fbg , ppg 150-180  - insulin on ss, administered by   - stable, cont as now   Encouraged to follow ADA diet

## 2020-12-15 NOTE — ASSESSMENT & PLAN NOTE
Currently full code. No documents in Epic. Discussed and reviewed Advanced Directives with spouse and patient on last visit.  HPOA, Living Will, [place] forms left for completion on last visit, spouse has not completed yet.

## 2020-12-15 NOTE — PATIENT INSTRUCTIONS
Patient Instructions:  - [facility] Nurse Practitioner to schedule home follow-up visit with patient in 6-8 weeks or as needed.  - Continue all medications, treatments and therapies as ordered.   - Follow all instructions, recommendations as discussed.  - Maintain Safety Precautions at all times.  - Attend all medical appointments as scheduled.  - For worsening symptoms: call Primary Care Physician or Nurse Practitioner.  - For emergencies, call 911 or immediately report to the nearest emergency room.  - Limit Risks of environmental exposure to coronavirus/COVID-19 as discussed including: social distancing, hand hygiene, and limiting departures from the home for necessities only.       Exercise for Parkinson's Disease  These exercises can help strengthen your muscles and keep them loose and flexible. Ask your healthcare provider whether theyre right for you. Your healthcare provider or physical therapist may also suggest other exercises.  Do the exercises once a day at first, then build up to several times a day. Exercise slowly, and rest if you feel pain.              Body twist Seated march Back stretch   Body twist  Follow these steps:  · Sit in a chair, facing forward. Place your hands on your shoulders.  · Turn your head and body to the side as far as possible, as if you were trying to look behind you.  · Return to starting position, then turn to the other side.  · Repeat 10 times.  Seated march  Follow these steps:  · Sit in a chair, facing forward.  · Slowly lift one knee as high as you can, then lower your foot to the floor.  · Do the same with your other leg.  · Repeat 10 times with each leg.  Back stretch  Follow these steps:  · Stand or sit with your back straight.  · Hold your arms in front of you. Put your hands and elbows together, hands pointing toward the ceiling.  · Move your arms apart as far as possible, pushing your shoulder blades together.  · Slowly move your hands back  together.  · Repeat 10 times.  Date Last Reviewed: 10/11/2015  © 3229-5890 [employer], [employer] [address] [place] [address], [place] [address]. All rights reserved. This information is not intended as a substitute for professional medical care. Always follow your healthcare professional's instructions.        Parkinsons Disease: Coping Tips for Caregivers  In the early stages of Parkinsons disease, your loved one can likely handle most tasks without help. But over time, you may find yourself taking on more and more responsibilities. Keep in mind that you cant take good care of someone else if you dont take care of yourself, too. So be sure to take breaks when you need them. Its not being selfish. Its vital.    Give yourself a break  Its OK to spend time outside your role as caregiver. It may not feel right at first. But even simple things can help ease stress. Try these tips:  · Go to a movie or concert.  · Have a meal with friends.  · Read a book or write in a journal.  · Take a walk.  · Pursue a hobby.  · Take a long bath.  If you feel depressed  Taking care of your loved one may leave you feeling frustrated, sad, or worn out. This isnt a sign youre doing something wrong. Its completely normal. However, be on the lookout for signs of depression. These include feeling sad, guilty, tired, or helpless most of the time. Other signs include trouble sleeping, and loss of interest in activities or food. If you notice these signs in yourself, or in your loved one, talk to a doctor. Depression can and should be treated.  Accept help  Caring for someone with Parkinsons disease takes more than 1 person. So learn to accept help when its offered. And be willing to ask for help when you need it. People who care about you and your loved one really do want to help. You can also talk to the doctor or a  about options for respite care (temporary help). This can range from adult day care to a home health  aide who cares for your loved one a few times each month.  Date Last Reviewed: 11/9/2015  © 9957-8337 [employer]. [address] [place] [address], [place] [address]. All rights reserved. This information is not intended as a substitute for professional medical care. Always follow your healthcare professional's instructions.        Preventing Pressure Sores (Pressure Ulcers)  Pressure sores can develop quickly, even in healthy skin. Thats why taking steps to prevent them is so important. Taking pressure off your skin is the first step. That means changing positions often, supporting your body, and avoiding rubbing and sliding. Keeping your skin clean, eating well, and stretching your joints and muscles can also help prevent pressure sores. Be sure to check your skin daily, too.  Change positions often  Changing positions often allows blood to get to your skin and keep the tissue healthy.  In a chair  · Shift weight from side to side at least once an hour--every 15 minutes if possible.  · Ask about pads and cushions that can reduce pressure on your skin.  In bed  · Change positions at least every 2 hours, more often if possible.  · Use lightweight sheets and blankets to reduce pressure from above.  · Ask about special pads and mattresses that spread pressure over a larger area of your body.  Support your body  Supporting your body spreads pressure over a larger area.  In a chair  · Lightly cushion your back and buttocks. Dont use doughnut-type cushions. They can cut off the blood supply to your skin.  · Lightly pad the footrest on your wheelchair.  In bed  · When lying on your back, put pillows under your lower calves and ankles. Keep your elbows slightly bent.  · When lying on your side, put pillows behind your back, between your legs, and between your ankles. Keep elbows and knees slightly bent.  Avoid rubbing and sliding  Rubbing (friction) and sliding (shear) cause the skin to break down more easily.  In a  chair  · Keep your feet on a footrest, so your thighs are horizontal. This keeps your buttocks from sliding forward.  · Support your shoulder blades and back with a pillow.  In bed  · Keep your sheets smooth, dry, and free of crumbs. Use a sheepskin pad to prevent rubbing.  · Keep your feet and head slightly raised to avoid sliding. When on bed rest, dont raise your head more than 30 degrees, except when needed for some medical conditions or for eating.   Keep your skin clean  Keeping your skin clean and dry also helps prevent pressure sores.  · Keep your skin free of sweat, urine, or wound drainage.  · Apply protective creams and use absorbent pads if you don't have bladder or bowel control.  · Check your skin twice a day for signs of breakdown.  Eat healthy and move around  If you are in a bed or a wheelchair most or all of the time, you need to:  · Eat enough calories to stay at a stable weight.  · Get plenty of protein, vitamins, and iron, and drink lots of fluids each day.  · Get out of your bed or chair as much as possible.  Check your skin twice a day  Do skin checks each day as part of your daily routine. Skin breakdown starts with slight changes, but can progress very quickly.   · Look for redness, bruises, cuts, and other irritations, especially over bony areas.  · Look for scabbing, blistering, or open areas on the surface of your skin. These are more serious and must be treated right away.     Date Last Reviewed: 8/13/2015  © 9000-6985 [employer], [employer]. [address] [place] [address], [place] [address]. All rights reserved. This information is not intended as a substitute for professional medical care. Always follow your healthcare professional's instructions.

## 2020-12-15 NOTE — ASSESSMENT & PLAN NOTE
BP stable today, on Lisinopril.  Serial BP checks recommended to Isak.  Low salt diet discussed.  BG controlled.  ADA diet recommended.

## 2020-12-15 NOTE — ASSESSMENT & PLAN NOTE
Stable on Buspar.  Reports some anxiety due to not being as independent as she used to be.  Emotional support provided.

## 2020-12-15 NOTE — ASSESSMENT & PLAN NOTE
Chronic.  - mostly affecting bilateral knees and back, limiting mobility in addition to Parkinsons.  - takes acetaminophen 500mg in the am and 1000mg in the pm  - pain currently 0/10  - cont as now

## 2020-12-15 NOTE — ASSESSMENT & PLAN NOTE
Chronic  - complicated by Parkinson, wears adult diapers  - stable, cont as now   Discussed meticulous perineal care to prevent UTI and preserve skin integrity

## 2020-12-15 NOTE — ASSESSMENT & PLAN NOTE
Chronic.  Followed by neurology, last visit > 1 year ago, diagnosed in 2014  - severe bradykinesia and resting tremors  - requires assistance with all ADLs and IADLs except feeding.   - not currently taking azilect due to s/e of stiffness   - instructed  to make f/u appt with neurology asap to discuss alternative treatment.

## 2020-12-15 NOTE — ASSESSMENT & PLAN NOTE
Chronic and related to Parkinsons and OA.  Fall precautions and safety advised.  Spouse provides assistance with all ADLs.

## 2020-12-15 NOTE — ASSESSMENT & PLAN NOTE
"No injury reported. Spouse reports patient slid out of chair.  Patient gets upset today reporting "I can walk but he won't let me".  She is clearly very weak and spouse reminds her she cannot walk.  Currently has PT in the home.  Fall precautions and safety advised.   "

## 2020-12-16 DIAGNOSIS — Z79.4 CONTROLLED TYPE 2 DIABETES MELLITUS WITH BOTH EYES AFFECTED BY MODERATE NONPROLIFERATIVE RETINOPATHY WITHOUT MACULAR EDEMA, WITH LONG-TERM CURRENT USE OF INSULIN: ICD-10-CM

## 2020-12-16 DIAGNOSIS — E11.3393 CONTROLLED TYPE 2 DIABETES MELLITUS WITH BOTH EYES AFFECTED BY MODERATE NONPROLIFERATIVE RETINOPATHY WITHOUT MACULAR EDEMA, WITH LONG-TERM CURRENT USE OF INSULIN: ICD-10-CM

## 2020-12-17 RX ORDER — GLIMEPIRIDE 4 MG/1
TABLET ORAL
Qty: 90 TABLET | Refills: 3 | Status: SHIPPED | OUTPATIENT
Start: 2020-12-17 | End: 2021-01-20 | Stop reason: SDUPTHER

## 2020-12-22 ENCOUNTER — DOCUMENT SCAN (OUTPATIENT)
Dept: HOME HEALTH SERVICES | Facility: HOSPITAL | Age: 80
End: 2020-12-22
Payer: MEDICARE

## 2020-12-23 NOTE — TELEPHONE ENCOUNTER
----- Message from Jessica Jennings sent at 3/1/2019  2:37 PM CST -----  Contact: Patient  Type: Needs Medical Advice    Who Called:  Patient  Symptoms (please be specific):  na  How long has patient had these symptoms:  neri  Pharmacy name and phone #:  neri  Best Call Back Number: [phone] (home)    Additional Information: Patient states that she is calling to give the office the correct plan number to the office for [employer]. This is for a walk in bath tub and physical therapy. The plan number [id number].     No

## 2021-01-04 DIAGNOSIS — K21.9 GERD WITHOUT ESOPHAGITIS: ICD-10-CM

## 2021-01-05 RX ORDER — OMEPRAZOLE 40 MG/1
40 CAPSULE, DELAYED RELEASE ORAL DAILY
Qty: 30 CAPSULE | Refills: 11
Start: 2021-01-05 | End: 2021-01-19 | Stop reason: SDUPTHER

## 2021-01-07 DIAGNOSIS — G20.A1 PARKINSON DISEASE: ICD-10-CM

## 2021-01-07 RX ORDER — RASAGILINE 0.5 MG/1
0.5 TABLET ORAL DAILY
Qty: 30 TABLET | Refills: 2 | Status: SHIPPED | OUTPATIENT
Start: 2021-01-07 | End: 2021-03-31 | Stop reason: SDUPTHER

## 2021-01-10 ENCOUNTER — IMMUNIZATION (OUTPATIENT)
Dept: PRIMARY CARE CLINIC | Facility: CLINIC | Age: 81
End: 2021-01-10
Payer: MEDICARE

## 2021-01-10 DIAGNOSIS — Z23 NEED FOR VACCINATION: ICD-10-CM

## 2021-01-10 PROCEDURE — 0001A COVID-19, MRNA, LNP-S, PF, 30 MCG/0.3 ML DOSE VACCINE: CPT | Mod: S$GLB,,, | Performed by: FAMILY MEDICINE

## 2021-01-10 PROCEDURE — 0001A COVID-19, MRNA, LNP-S, PF, 30 MCG/0.3 ML DOSE VACCINE: ICD-10-PCS | Mod: S$GLB,,, | Performed by: FAMILY MEDICINE

## 2021-01-10 PROCEDURE — 91300 COVID-19, MRNA, LNP-S, PF, 30 MCG/0.3 ML DOSE VACCINE: ICD-10-PCS | Mod: S$GLB,,, | Performed by: FAMILY MEDICINE

## 2021-01-10 PROCEDURE — 91300 COVID-19, MRNA, LNP-S, PF, 30 MCG/0.3 ML DOSE VACCINE: CPT | Mod: S$GLB,,, | Performed by: FAMILY MEDICINE

## 2021-01-28 RX ORDER — BUSPIRONE HYDROCHLORIDE 7.5 MG/1
7.5 TABLET ORAL 3 TIMES DAILY
Qty: 90 TABLET | Refills: 11 | Status: SHIPPED | OUTPATIENT
Start: 2021-01-28 | End: 2021-03-03 | Stop reason: SDUPTHER

## 2021-01-31 ENCOUNTER — IMMUNIZATION (OUTPATIENT)
Dept: PRIMARY CARE CLINIC | Facility: CLINIC | Age: 81
End: 2021-01-31
Payer: MEDICARE

## 2021-01-31 DIAGNOSIS — Z23 NEED FOR VACCINATION: Primary | ICD-10-CM

## 2021-01-31 PROCEDURE — 91300 COVID-19, MRNA, LNP-S, PF, 30 MCG/0.3 ML DOSE VACCINE: CPT | Mod: S$GLB,,, | Performed by: FAMILY MEDICINE

## 2021-01-31 PROCEDURE — 0002A COVID-19, MRNA, LNP-S, PF, 30 MCG/0.3 ML DOSE VACCINE: CPT | Mod: S$GLB,,, | Performed by: FAMILY MEDICINE

## 2021-01-31 PROCEDURE — 91300 COVID-19, MRNA, LNP-S, PF, 30 MCG/0.3 ML DOSE VACCINE: ICD-10-PCS | Mod: S$GLB,,, | Performed by: FAMILY MEDICINE

## 2021-01-31 PROCEDURE — 0002A COVID-19, MRNA, LNP-S, PF, 30 MCG/0.3 ML DOSE VACCINE: ICD-10-PCS | Mod: S$GLB,,, | Performed by: FAMILY MEDICINE

## 2021-02-17 ENCOUNTER — TELEPHONE (OUTPATIENT)
Dept: CARDIOLOGY | Facility: CLINIC | Age: 81
End: 2021-02-17

## 2021-03-02 ENCOUNTER — CARE AT HOME (OUTPATIENT)
Dept: HOME HEALTH SERVICES | Facility: CLINIC | Age: 81
End: 2021-03-02
Payer: MEDICARE

## 2021-03-02 VITALS
RESPIRATION RATE: 16 BRPM | HEART RATE: 75 BPM | OXYGEN SATURATION: 97 % | TEMPERATURE: 98 F | SYSTOLIC BLOOD PRESSURE: 128 MMHG | DIASTOLIC BLOOD PRESSURE: 70 MMHG

## 2021-03-02 DIAGNOSIS — G20.A1 PARKINSON DISEASE: Primary | ICD-10-CM

## 2021-03-02 DIAGNOSIS — M15.9 PRIMARY OSTEOARTHRITIS INVOLVING MULTIPLE JOINTS: ICD-10-CM

## 2021-03-02 DIAGNOSIS — Z78.9 IMPAIRED MOBILITY AND ACTIVITIES OF DAILY LIVING: ICD-10-CM

## 2021-03-02 DIAGNOSIS — Z51.5 PALLIATIVE CARE ENCOUNTER: ICD-10-CM

## 2021-03-02 DIAGNOSIS — F41.9 ANXIETY: ICD-10-CM

## 2021-03-02 DIAGNOSIS — Z86.718 HISTORY OF DVT (DEEP VEIN THROMBOSIS): ICD-10-CM

## 2021-03-02 DIAGNOSIS — N39.46 MIXED URGE AND STRESS INCONTINENCE: ICD-10-CM

## 2021-03-02 DIAGNOSIS — Z79.4 TYPE 2 DIABETES MELLITUS WITH DIABETIC NEUROPATHY, WITH LONG-TERM CURRENT USE OF INSULIN: ICD-10-CM

## 2021-03-02 DIAGNOSIS — Z74.09 IMPAIRED MOBILITY AND ACTIVITIES OF DAILY LIVING: ICD-10-CM

## 2021-03-02 DIAGNOSIS — E11.59 HYPERTENSION ASSOCIATED WITH DIABETES: ICD-10-CM

## 2021-03-02 DIAGNOSIS — Z74.09 NEED FOR ASSISTANCE DUE TO REDUCED MOBILITY: ICD-10-CM

## 2021-03-02 DIAGNOSIS — F43.21 GRIEF: ICD-10-CM

## 2021-03-02 DIAGNOSIS — I15.2 HYPERTENSION ASSOCIATED WITH DIABETES: ICD-10-CM

## 2021-03-02 DIAGNOSIS — I48.20 CHRONIC ATRIAL FIBRILLATION: ICD-10-CM

## 2021-03-02 DIAGNOSIS — E11.40 TYPE 2 DIABETES MELLITUS WITH DIABETIC NEUROPATHY, WITH LONG-TERM CURRENT USE OF INSULIN: ICD-10-CM

## 2021-03-02 DIAGNOSIS — K59.09 CHRONIC CONSTIPATION: ICD-10-CM

## 2021-03-02 PROCEDURE — 3078F PR MOST RECENT DIASTOLIC BLOOD PRESSURE < 80 MM HG: ICD-10-PCS | Mod: CPTII,S$GLB,, | Performed by: NURSE PRACTITIONER

## 2021-03-02 PROCEDURE — 1159F MED LIST DOCD IN RCRD: CPT | Mod: S$GLB,,, | Performed by: NURSE PRACTITIONER

## 2021-03-02 PROCEDURE — 99350 HOME/RES VST EST HIGH MDM 60: CPT | Mod: S$GLB,,, | Performed by: NURSE PRACTITIONER

## 2021-03-02 PROCEDURE — 3074F PR MOST RECENT SYSTOLIC BLOOD PRESSURE < 130 MM HG: ICD-10-PCS | Mod: CPTII,S$GLB,, | Performed by: NURSE PRACTITIONER

## 2021-03-02 PROCEDURE — 3078F DIAST BP <80 MM HG: CPT | Mod: CPTII,S$GLB,, | Performed by: NURSE PRACTITIONER

## 2021-03-02 PROCEDURE — 3074F SYST BP LT 130 MM HG: CPT | Mod: CPTII,S$GLB,, | Performed by: NURSE PRACTITIONER

## 2021-03-02 PROCEDURE — 1159F PR MEDICATION LIST DOCUMENTED IN MEDICAL RECORD: ICD-10-PCS | Mod: S$GLB,,, | Performed by: NURSE PRACTITIONER

## 2021-03-02 PROCEDURE — 99350 PR HOME VISIT,ESTAB PATIENT,LEVEL IV: ICD-10-PCS | Mod: S$GLB,,, | Performed by: NURSE PRACTITIONER

## 2021-03-03 ENCOUNTER — PATIENT MESSAGE (OUTPATIENT)
Dept: PRIMARY CARE CLINIC | Facility: CLINIC | Age: 81
End: 2021-03-03

## 2021-03-03 RX ORDER — BUSPIRONE HYDROCHLORIDE 7.5 MG/1
7.5 TABLET ORAL 3 TIMES DAILY
Qty: 90 TABLET | Refills: 11 | Status: SHIPPED | OUTPATIENT
Start: 2021-03-03 | End: 2021-06-17 | Stop reason: SDUPTHER

## 2021-03-04 PROBLEM — L89.152 PRESSURE INJURY OF SACRAL REGION, STAGE 2: Status: RESOLVED | Noted: 2020-10-15 | Resolved: 2021-03-04

## 2021-03-05 ENCOUNTER — TELEPHONE (OUTPATIENT)
Dept: PRIMARY CARE CLINIC | Facility: CLINIC | Age: 81
End: 2021-03-05

## 2021-03-05 DIAGNOSIS — F41.9 ANXIETY: Primary | ICD-10-CM

## 2021-03-05 PROBLEM — F43.21 GRIEF: Status: ACTIVE | Noted: 2021-03-05

## 2021-03-31 ENCOUNTER — EXTERNAL VISIT (OUTPATIENT)
Dept: PRIMARY CARE CLINIC | Facility: CLINIC | Age: 81
End: 2021-03-31
Payer: MEDICARE

## 2021-03-31 DIAGNOSIS — Z79.4 TYPE 2 DIABETES MELLITUS WITH DIABETIC NEUROPATHY, WITH LONG-TERM CURRENT USE OF INSULIN: ICD-10-CM

## 2021-03-31 DIAGNOSIS — I15.2 HYPERTENSION ASSOCIATED WITH DIABETES: Primary | ICD-10-CM

## 2021-03-31 DIAGNOSIS — E11.40 TYPE 2 DIABETES MELLITUS WITH DIABETIC NEUROPATHY, WITH LONG-TERM CURRENT USE OF INSULIN: ICD-10-CM

## 2021-03-31 DIAGNOSIS — E11.59 HYPERTENSION ASSOCIATED WITH DIABETES: Primary | ICD-10-CM

## 2021-03-31 DIAGNOSIS — G20.A1 PARKINSON DISEASE: ICD-10-CM

## 2021-03-31 DIAGNOSIS — I48.20 CHRONIC ATRIAL FIBRILLATION: ICD-10-CM

## 2021-03-31 PROCEDURE — 99350 PR HOME VISIT,ESTAB PATIENT,LEVEL IV: ICD-10-PCS | Mod: S$GLB,,, | Performed by: NURSE PRACTITIONER

## 2021-03-31 PROCEDURE — 99350 HOME/RES VST EST HIGH MDM 60: CPT | Mod: S$GLB,,, | Performed by: NURSE PRACTITIONER

## 2021-03-31 RX ORDER — DILTIAZEM HYDROCHLORIDE 120 MG/1
120 CAPSULE, EXTENDED RELEASE ORAL NIGHTLY
Qty: 90 CAPSULE | Refills: 3 | Status: SHIPPED | OUTPATIENT
Start: 2021-03-31 | End: 2022-03-30 | Stop reason: SDUPTHER

## 2021-03-31 RX ORDER — RASAGILINE 0.5 MG/1
0.5 TABLET ORAL DAILY
Qty: 90 TABLET | Refills: 3 | Status: SHIPPED | OUTPATIENT
Start: 2021-03-31 | End: 2021-10-04 | Stop reason: SDUPTHER

## 2021-04-08 ENCOUNTER — TELEPHONE (OUTPATIENT)
Dept: PRIMARY CARE CLINIC | Facility: CLINIC | Age: 81
End: 2021-04-08

## 2021-04-08 VITALS — DIASTOLIC BLOOD PRESSURE: 65 MMHG | SYSTOLIC BLOOD PRESSURE: 120 MMHG | HEART RATE: 72 BPM | OXYGEN SATURATION: 96 %

## 2021-04-26 ENCOUNTER — OFFICE VISIT (OUTPATIENT)
Dept: CARDIOLOGY | Facility: CLINIC | Age: 81
End: 2021-04-26
Payer: MEDICARE

## 2021-04-26 VITALS
SYSTOLIC BLOOD PRESSURE: 118 MMHG | RESPIRATION RATE: 16 BRPM | HEART RATE: 77 BPM | DIASTOLIC BLOOD PRESSURE: 80 MMHG | BODY MASS INDEX: 25.09 KG/M2 | OXYGEN SATURATION: 97 % | HEIGHT: 68 IN

## 2021-04-26 DIAGNOSIS — E11.59 HYPERTENSION ASSOCIATED WITH DIABETES: ICD-10-CM

## 2021-04-26 DIAGNOSIS — E11.69 HYPERLIPIDEMIA ASSOCIATED WITH TYPE 2 DIABETES MELLITUS: ICD-10-CM

## 2021-04-26 DIAGNOSIS — I48.20 CHRONIC ATRIAL FIBRILLATION: Primary | ICD-10-CM

## 2021-04-26 DIAGNOSIS — I15.2 HYPERTENSION ASSOCIATED WITH DIABETES: ICD-10-CM

## 2021-04-26 DIAGNOSIS — E78.5 HYPERLIPIDEMIA ASSOCIATED WITH TYPE 2 DIABETES MELLITUS: ICD-10-CM

## 2021-04-26 PROCEDURE — 1126F AMNT PAIN NOTED NONE PRSNT: CPT | Mod: S$GLB,,, | Performed by: INTERNAL MEDICINE

## 2021-04-26 PROCEDURE — 1159F MED LIST DOCD IN RCRD: CPT | Mod: S$GLB,,, | Performed by: INTERNAL MEDICINE

## 2021-04-26 PROCEDURE — 3288F PR FALLS RISK ASSESSMENT DOCUMENTED: ICD-10-PCS | Mod: CPTII,S$GLB,, | Performed by: INTERNAL MEDICINE

## 2021-04-26 PROCEDURE — 1101F PR PT FALLS ASSESS DOC 0-1 FALLS W/OUT INJ PAST YR: ICD-10-PCS | Mod: CPTII,S$GLB,, | Performed by: INTERNAL MEDICINE

## 2021-04-26 PROCEDURE — 1159F PR MEDICATION LIST DOCUMENTED IN MEDICAL RECORD: ICD-10-PCS | Mod: S$GLB,,, | Performed by: INTERNAL MEDICINE

## 2021-04-26 PROCEDURE — 99214 OFFICE O/P EST MOD 30 MIN: CPT | Mod: S$GLB,,, | Performed by: INTERNAL MEDICINE

## 2021-04-26 PROCEDURE — 3288F FALL RISK ASSESSMENT DOCD: CPT | Mod: CPTII,S$GLB,, | Performed by: INTERNAL MEDICINE

## 2021-04-26 PROCEDURE — 1101F PT FALLS ASSESS-DOCD LE1/YR: CPT | Mod: CPTII,S$GLB,, | Performed by: INTERNAL MEDICINE

## 2021-04-26 PROCEDURE — 99214 PR OFFICE/OUTPT VISIT, EST, LEVL IV, 30-39 MIN: ICD-10-PCS | Mod: S$GLB,,, | Performed by: INTERNAL MEDICINE

## 2021-04-26 PROCEDURE — 1126F PR PAIN SEVERITY QUANTIFIED, NO PAIN PRESENT: ICD-10-PCS | Mod: S$GLB,,, | Performed by: INTERNAL MEDICINE

## 2021-05-10 ENCOUNTER — PATIENT MESSAGE (OUTPATIENT)
Dept: HOME HEALTH SERVICES | Facility: CLINIC | Age: 81
End: 2021-05-10

## 2021-05-21 ENCOUNTER — CARE AT HOME (OUTPATIENT)
Dept: HOME HEALTH SERVICES | Facility: CLINIC | Age: 81
End: 2021-05-21
Payer: MEDICARE

## 2021-05-21 DIAGNOSIS — Z79.4 TYPE 2 DIABETES MELLITUS WITH DIABETIC NEUROPATHY, WITH LONG-TERM CURRENT USE OF INSULIN: ICD-10-CM

## 2021-05-21 DIAGNOSIS — Z51.5 PALLIATIVE CARE ENCOUNTER: ICD-10-CM

## 2021-05-21 DIAGNOSIS — N39.46 MIXED URGE AND STRESS INCONTINENCE: ICD-10-CM

## 2021-05-21 DIAGNOSIS — G20.A1 PARKINSON DISEASE: Primary | ICD-10-CM

## 2021-05-21 DIAGNOSIS — M15.9 PRIMARY OSTEOARTHRITIS INVOLVING MULTIPLE JOINTS: ICD-10-CM

## 2021-05-21 DIAGNOSIS — E11.40 TYPE 2 DIABETES MELLITUS WITH DIABETIC NEUROPATHY, WITH LONG-TERM CURRENT USE OF INSULIN: ICD-10-CM

## 2021-05-21 DIAGNOSIS — E11.59 HYPERTENSION ASSOCIATED WITH DIABETES: ICD-10-CM

## 2021-05-21 DIAGNOSIS — I48.20 CHRONIC ATRIAL FIBRILLATION: ICD-10-CM

## 2021-05-21 DIAGNOSIS — I15.2 HYPERTENSION ASSOCIATED WITH DIABETES: ICD-10-CM

## 2021-05-21 DIAGNOSIS — Z74.09 IMPAIRED MOBILITY AND ACTIVITIES OF DAILY LIVING: ICD-10-CM

## 2021-05-21 DIAGNOSIS — Z78.9 IMPAIRED MOBILITY AND ACTIVITIES OF DAILY LIVING: ICD-10-CM

## 2021-05-21 DIAGNOSIS — F41.9 ANXIETY: ICD-10-CM

## 2021-05-21 PROCEDURE — 1159F MED LIST DOCD IN RCRD: CPT | Mod: S$GLB,,, | Performed by: NURSE PRACTITIONER

## 2021-05-21 PROCEDURE — 99350 PR HOME VISIT,ESTAB PATIENT,LEVEL IV: ICD-10-PCS | Mod: S$GLB,,, | Performed by: NURSE PRACTITIONER

## 2021-05-21 PROCEDURE — 1159F PR MEDICATION LIST DOCUMENTED IN MEDICAL RECORD: ICD-10-PCS | Mod: S$GLB,,, | Performed by: NURSE PRACTITIONER

## 2021-05-21 PROCEDURE — 99350 HOME/RES VST EST HIGH MDM 60: CPT | Mod: S$GLB,,, | Performed by: NURSE PRACTITIONER

## 2021-05-31 VITALS
HEART RATE: 77 BPM | OXYGEN SATURATION: 98 % | DIASTOLIC BLOOD PRESSURE: 88 MMHG | TEMPERATURE: 98 F | RESPIRATION RATE: 16 BRPM | SYSTOLIC BLOOD PRESSURE: 148 MMHG

## 2021-05-31 PROBLEM — F43.21 GRIEF: Status: RESOLVED | Noted: 2021-03-05 | Resolved: 2021-05-31

## 2021-05-31 RX ORDER — GLIMEPIRIDE 4 MG/1
4 TABLET ORAL
COMMUNITY
End: 2021-08-12 | Stop reason: SDUPTHER

## 2021-06-17 RX ORDER — BUSPIRONE HYDROCHLORIDE 7.5 MG/1
7.5 TABLET ORAL 3 TIMES DAILY
Qty: 90 TABLET | Refills: 11 | Status: SHIPPED | OUTPATIENT
Start: 2021-06-17 | End: 2022-06-21 | Stop reason: SDUPTHER

## 2021-07-23 ENCOUNTER — CARE AT HOME (OUTPATIENT)
Dept: HOME HEALTH SERVICES | Facility: CLINIC | Age: 81
End: 2021-07-23
Payer: MEDICARE

## 2021-07-23 DIAGNOSIS — R53.1 WEAKNESS: ICD-10-CM

## 2021-07-23 DIAGNOSIS — E11.40 TYPE 2 DIABETES MELLITUS WITH DIABETIC NEUROPATHY, WITH LONG-TERM CURRENT USE OF INSULIN: ICD-10-CM

## 2021-07-23 DIAGNOSIS — E11.59 HYPERTENSION ASSOCIATED WITH DIABETES: ICD-10-CM

## 2021-07-23 DIAGNOSIS — Z79.4 TYPE 2 DIABETES MELLITUS WITH DIABETIC NEUROPATHY, WITH LONG-TERM CURRENT USE OF INSULIN: ICD-10-CM

## 2021-07-23 DIAGNOSIS — Z78.9 IMPAIRED MOBILITY AND ACTIVITIES OF DAILY LIVING: ICD-10-CM

## 2021-07-23 DIAGNOSIS — G20.A1 PARKINSON DISEASE: Primary | ICD-10-CM

## 2021-07-23 DIAGNOSIS — Z74.09 NEED FOR ASSISTANCE DUE TO REDUCED MOBILITY: ICD-10-CM

## 2021-07-23 DIAGNOSIS — Z74.09 IMPAIRED MOBILITY AND ACTIVITIES OF DAILY LIVING: ICD-10-CM

## 2021-07-23 DIAGNOSIS — I15.2 HYPERTENSION ASSOCIATED WITH DIABETES: ICD-10-CM

## 2021-07-23 PROCEDURE — 99442 PR PHYSICIAN TELEPHONE EVALUATION 11-20 MIN: CPT | Mod: 95,,, | Performed by: NURSE PRACTITIONER

## 2021-07-23 PROCEDURE — 99442 PR PHYSICIAN TELEPHONE EVALUATION 11-20 MIN: ICD-10-PCS | Mod: 95,,, | Performed by: NURSE PRACTITIONER

## 2021-07-25 PROBLEM — R53.1 WEAKNESS: Status: ACTIVE | Noted: 2021-07-25

## 2021-07-27 PROCEDURE — G0179 MD RECERTIFICATION HHA PT: HCPCS | Mod: ,,, | Performed by: NURSE PRACTITIONER

## 2021-07-27 PROCEDURE — G0179 PR HOME HEALTH MD RECERTIFICATION: ICD-10-PCS | Mod: ,,, | Performed by: NURSE PRACTITIONER

## 2021-08-06 ENCOUNTER — EXTERNAL HOME HEALTH (OUTPATIENT)
Dept: HOME HEALTH SERVICES | Facility: HOSPITAL | Age: 81
End: 2021-08-06
Payer: MEDICARE

## 2021-08-12 ENCOUNTER — EXTERNAL VISIT (OUTPATIENT)
Dept: PRIMARY CARE CLINIC | Facility: CLINIC | Age: 81
End: 2021-08-12
Payer: MEDICARE

## 2021-08-12 VITALS — OXYGEN SATURATION: 96 % | SYSTOLIC BLOOD PRESSURE: 140 MMHG | DIASTOLIC BLOOD PRESSURE: 88 MMHG | HEART RATE: 72 BPM

## 2021-08-12 DIAGNOSIS — K21.9 GERD WITHOUT ESOPHAGITIS: ICD-10-CM

## 2021-08-12 DIAGNOSIS — E55.9 VITAMIN D DEFICIENCY: ICD-10-CM

## 2021-08-12 DIAGNOSIS — Z79.4 TYPE 2 DIABETES MELLITUS WITH DIABETIC NEUROPATHY, WITH LONG-TERM CURRENT USE OF INSULIN: ICD-10-CM

## 2021-08-12 DIAGNOSIS — E11.40 TYPE 2 DIABETES MELLITUS WITH DIABETIC NEUROPATHY, WITH LONG-TERM CURRENT USE OF INSULIN: ICD-10-CM

## 2021-08-12 DIAGNOSIS — G20.A1 PARKINSON DISEASE: Primary | ICD-10-CM

## 2021-08-12 DIAGNOSIS — Z79.4 CONTROLLED TYPE 2 DIABETES MELLITUS WITH BOTH EYES AFFECTED BY MODERATE NONPROLIFERATIVE RETINOPATHY WITHOUT MACULAR EDEMA, WITH LONG-TERM CURRENT USE OF INSULIN: ICD-10-CM

## 2021-08-12 DIAGNOSIS — I15.2 HYPERTENSION ASSOCIATED WITH DIABETES: ICD-10-CM

## 2021-08-12 DIAGNOSIS — E11.3393 CONTROLLED TYPE 2 DIABETES MELLITUS WITH BOTH EYES AFFECTED BY MODERATE NONPROLIFERATIVE RETINOPATHY WITHOUT MACULAR EDEMA, WITH LONG-TERM CURRENT USE OF INSULIN: ICD-10-CM

## 2021-08-12 DIAGNOSIS — E11.59 HYPERTENSION ASSOCIATED WITH DIABETES: ICD-10-CM

## 2021-08-12 PROCEDURE — 99215 OFFICE O/P EST HI 40 MIN: CPT | Mod: S$GLB,,, | Performed by: NURSE PRACTITIONER

## 2021-08-12 PROCEDURE — 99215 PR OFFICE/OUTPT VISIT, EST, LEVL V, 40-54 MIN: ICD-10-PCS | Mod: S$GLB,,, | Performed by: NURSE PRACTITIONER

## 2021-08-12 RX ORDER — GLIMEPIRIDE 4 MG/1
4 TABLET ORAL
Qty: 90 TABLET | Refills: 3 | Status: SHIPPED | OUTPATIENT
Start: 2021-08-12 | End: 2022-02-07 | Stop reason: SDUPTHER

## 2021-08-12 RX ORDER — APIXABAN 5 MG/1
TABLET, FILM COATED ORAL
Qty: 180 TABLET | Refills: 3 | Status: SHIPPED | OUTPATIENT
Start: 2021-08-12 | End: 2022-03-30 | Stop reason: SDUPTHER

## 2021-08-12 RX ORDER — LISINOPRIL 20 MG/1
TABLET ORAL
Qty: 90 TABLET | Refills: 3 | Status: SHIPPED | OUTPATIENT
Start: 2021-08-12 | End: 2022-07-05 | Stop reason: SDUPTHER

## 2021-08-23 DIAGNOSIS — E11.3393 CONTROLLED TYPE 2 DIABETES MELLITUS WITH BOTH EYES AFFECTED BY MODERATE NONPROLIFERATIVE RETINOPATHY WITHOUT MACULAR EDEMA, WITH LONG-TERM CURRENT USE OF INSULIN: ICD-10-CM

## 2021-08-23 DIAGNOSIS — Z79.4 CONTROLLED TYPE 2 DIABETES MELLITUS WITH BOTH EYES AFFECTED BY MODERATE NONPROLIFERATIVE RETINOPATHY WITHOUT MACULAR EDEMA, WITH LONG-TERM CURRENT USE OF INSULIN: ICD-10-CM

## 2021-08-24 ENCOUNTER — PATIENT MESSAGE (OUTPATIENT)
Dept: PRIMARY CARE CLINIC | Facility: CLINIC | Age: 81
End: 2021-08-24

## 2021-08-24 ENCOUNTER — PATIENT MESSAGE (OUTPATIENT)
Dept: HOME HEALTH SERVICES | Facility: CLINIC | Age: 81
End: 2021-08-24

## 2021-08-24 RX ORDER — INSULIN DETEMIR 100 [IU]/ML
20 INJECTION, SOLUTION SUBCUTANEOUS NIGHTLY
Qty: 6 ML | Refills: 0 | Status: SHIPPED | OUTPATIENT
Start: 2021-08-24 | End: 2021-08-25 | Stop reason: SDUPTHER

## 2021-08-25 ENCOUNTER — LAB VISIT (OUTPATIENT)
Dept: LAB | Facility: HOSPITAL | Age: 81
End: 2021-08-25
Attending: NURSE PRACTITIONER
Payer: MEDICARE

## 2021-08-25 ENCOUNTER — PATIENT MESSAGE (OUTPATIENT)
Dept: HOME HEALTH SERVICES | Facility: CLINIC | Age: 81
End: 2021-08-25

## 2021-08-25 DIAGNOSIS — Z79.4 CONTROLLED TYPE 2 DIABETES MELLITUS WITH BOTH EYES AFFECTED BY MODERATE NONPROLIFERATIVE RETINOPATHY WITHOUT MACULAR EDEMA, WITH LONG-TERM CURRENT USE OF INSULIN: ICD-10-CM

## 2021-08-25 DIAGNOSIS — E11.9 DIABETES MELLITUS WITHOUT COMPLICATION: Primary | ICD-10-CM

## 2021-08-25 DIAGNOSIS — E11.3393 CONTROLLED TYPE 2 DIABETES MELLITUS WITH BOTH EYES AFFECTED BY MODERATE NONPROLIFERATIVE RETINOPATHY WITHOUT MACULAR EDEMA, WITH LONG-TERM CURRENT USE OF INSULIN: ICD-10-CM

## 2021-08-25 LAB
ALBUMIN SERPL BCP-MCNC: 3.5 G/DL (ref 3.5–5.2)
ALP SERPL-CCNC: 78 U/L (ref 55–135)
ALT SERPL W/O P-5'-P-CCNC: 12 U/L (ref 10–44)
ANION GAP SERPL CALC-SCNC: 11 MMOL/L (ref 8–16)
AST SERPL-CCNC: 18 U/L (ref 10–40)
BASOPHILS # BLD AUTO: 0.03 K/UL (ref 0–0.2)
BASOPHILS NFR BLD: 0.5 % (ref 0–1.9)
BILIRUB SERPL-MCNC: 0.5 MG/DL (ref 0.1–1)
BUN SERPL-MCNC: 11 MG/DL (ref 8–23)
CALCIUM SERPL-MCNC: 9.5 MG/DL (ref 8.7–10.5)
CHLORIDE SERPL-SCNC: 103 MMOL/L (ref 95–110)
CO2 SERPL-SCNC: 28 MMOL/L (ref 23–29)
CREAT SERPL-MCNC: 0.7 MG/DL (ref 0.5–1.4)
DIFFERENTIAL METHOD: NORMAL
EOSINOPHIL # BLD AUTO: 0.1 K/UL (ref 0–0.5)
EOSINOPHIL NFR BLD: 2.2 % (ref 0–8)
ERYTHROCYTE [DISTWIDTH] IN BLOOD BY AUTOMATED COUNT: 12.4 % (ref 11.5–14.5)
EST. GFR  (AFRICAN AMERICAN): >60 ML/MIN/1.73 M^2
EST. GFR  (NON AFRICAN AMERICAN): >60 ML/MIN/1.73 M^2
ESTIMATED AVG GLUCOSE: 131 MG/DL (ref 68–131)
GLUCOSE SERPL-MCNC: 81 MG/DL (ref 70–110)
HBA1C MFR BLD: 6.2 % (ref 4–5.6)
HCT VFR BLD AUTO: 42.1 % (ref 37–48.5)
HGB BLD-MCNC: 13.7 G/DL (ref 12–16)
IMM GRANULOCYTES # BLD AUTO: 0.02 K/UL (ref 0–0.04)
IMM GRANULOCYTES NFR BLD AUTO: 0.3 % (ref 0–0.5)
LYMPHOCYTES # BLD AUTO: 2.2 K/UL (ref 1–4.8)
LYMPHOCYTES NFR BLD: 35.6 % (ref 18–48)
MAGNESIUM SERPL-MCNC: 1.8 MG/DL (ref 1.6–2.6)
MCH RBC QN AUTO: 30.9 PG (ref 27–31)
MCHC RBC AUTO-ENTMCNC: 32.5 G/DL (ref 32–36)
MCV RBC AUTO: 95 FL (ref 82–98)
MONOCYTES # BLD AUTO: 0.4 K/UL (ref 0.3–1)
MONOCYTES NFR BLD: 6.7 % (ref 4–15)
NEUTROPHILS # BLD AUTO: 3.4 K/UL (ref 1.8–7.7)
NEUTROPHILS NFR BLD: 54.7 % (ref 38–73)
NRBC BLD-RTO: 0 /100 WBC
PLATELET # BLD AUTO: 310 K/UL (ref 150–450)
PMV BLD AUTO: 10.2 FL (ref 9.2–12.9)
POTASSIUM SERPL-SCNC: 4.5 MMOL/L (ref 3.5–5.1)
PROT SERPL-MCNC: 7.3 G/DL (ref 6–8.4)
RBC # BLD AUTO: 4.44 M/UL (ref 4–5.4)
SODIUM SERPL-SCNC: 142 MMOL/L (ref 136–145)
WBC # BLD AUTO: 6.23 K/UL (ref 3.9–12.7)

## 2021-08-25 PROCEDURE — 83036 HEMOGLOBIN GLYCOSYLATED A1C: CPT | Performed by: NURSE PRACTITIONER

## 2021-08-25 PROCEDURE — 85025 COMPLETE CBC W/AUTO DIFF WBC: CPT | Mod: PO | Performed by: NURSE PRACTITIONER

## 2021-08-25 PROCEDURE — 80053 COMPREHEN METABOLIC PANEL: CPT | Performed by: NURSE PRACTITIONER

## 2021-08-25 PROCEDURE — 83735 ASSAY OF MAGNESIUM: CPT | Performed by: NURSE PRACTITIONER

## 2021-08-25 RX ORDER — INSULIN DETEMIR 100 [IU]/ML
20 INJECTION, SOLUTION SUBCUTANEOUS NIGHTLY
Qty: 6 ML | Refills: 0 | Status: SHIPPED | OUTPATIENT
Start: 2021-08-25 | End: 2021-11-29

## 2021-09-14 ENCOUNTER — DOCUMENT SCAN (OUTPATIENT)
Dept: HOME HEALTH SERVICES | Facility: HOSPITAL | Age: 81
End: 2021-09-14
Payer: MEDICARE

## 2021-09-15 ENCOUNTER — DOCUMENT SCAN (OUTPATIENT)
Dept: HOME HEALTH SERVICES | Facility: HOSPITAL | Age: 81
End: 2021-09-15
Payer: MEDICARE

## 2021-09-24 ENCOUNTER — CARE AT HOME (OUTPATIENT)
Dept: HOME HEALTH SERVICES | Facility: CLINIC | Age: 81
End: 2021-09-24
Payer: MEDICARE

## 2021-09-24 DIAGNOSIS — Z79.4 TYPE 2 DIABETES MELLITUS WITH DIABETIC NEUROPATHY, WITH LONG-TERM CURRENT USE OF INSULIN: ICD-10-CM

## 2021-09-24 DIAGNOSIS — Z74.09 IMPAIRED MOBILITY AND ACTIVITIES OF DAILY LIVING: ICD-10-CM

## 2021-09-24 DIAGNOSIS — G20.A1 PARKINSON DISEASE: Primary | ICD-10-CM

## 2021-09-24 DIAGNOSIS — E11.40 TYPE 2 DIABETES MELLITUS WITH DIABETIC NEUROPATHY, WITH LONG-TERM CURRENT USE OF INSULIN: ICD-10-CM

## 2021-09-24 DIAGNOSIS — E11.59 HYPERTENSION ASSOCIATED WITH DIABETES: ICD-10-CM

## 2021-09-24 DIAGNOSIS — Z74.09 NEED FOR ASSISTANCE DUE TO REDUCED MOBILITY: ICD-10-CM

## 2021-09-24 DIAGNOSIS — I15.2 HYPERTENSION ASSOCIATED WITH DIABETES: ICD-10-CM

## 2021-09-24 DIAGNOSIS — Z78.9 IMPAIRED MOBILITY AND ACTIVITIES OF DAILY LIVING: ICD-10-CM

## 2021-09-24 PROCEDURE — 99443 PR PHYSICIAN TELEPHONE EVALUATION 21-30 MIN: ICD-10-PCS | Mod: 95,,, | Performed by: NURSE PRACTITIONER

## 2021-09-24 PROCEDURE — 99443 PR PHYSICIAN TELEPHONE EVALUATION 21-30 MIN: CPT | Mod: 95,,, | Performed by: NURSE PRACTITIONER

## 2021-09-27 ENCOUNTER — EXTERNAL HOME HEALTH (OUTPATIENT)
Dept: HOME HEALTH SERVICES | Facility: HOSPITAL | Age: 81
End: 2021-09-27
Payer: MEDICARE

## 2021-10-04 DIAGNOSIS — G20.A1 PARKINSON DISEASE: ICD-10-CM

## 2021-10-05 RX ORDER — RASAGILINE 0.5 MG/1
0.5 TABLET ORAL DAILY
Qty: 90 TABLET | Refills: 3 | Status: SHIPPED | OUTPATIENT
Start: 2021-10-05 | End: 2021-10-11 | Stop reason: SDUPTHER

## 2021-10-11 DIAGNOSIS — G20.A1 PARKINSON DISEASE: ICD-10-CM

## 2021-10-11 RX ORDER — RASAGILINE 0.5 MG/1
0.5 TABLET ORAL DAILY
Qty: 90 TABLET | Refills: 3 | Status: SHIPPED | OUTPATIENT
Start: 2021-10-11 | End: 2022-03-30 | Stop reason: SDUPTHER

## 2021-11-08 ENCOUNTER — CARE AT HOME (OUTPATIENT)
Dept: HOME HEALTH SERVICES | Facility: CLINIC | Age: 81
End: 2021-11-08
Payer: MEDICARE

## 2021-11-08 VITALS
TEMPERATURE: 99 F | RESPIRATION RATE: 16 BRPM | SYSTOLIC BLOOD PRESSURE: 122 MMHG | OXYGEN SATURATION: 95 % | DIASTOLIC BLOOD PRESSURE: 78 MMHG | HEART RATE: 72 BPM

## 2021-11-08 DIAGNOSIS — E11.40 TYPE 2 DIABETES MELLITUS WITH DIABETIC NEUROPATHY, WITH LONG-TERM CURRENT USE OF INSULIN: ICD-10-CM

## 2021-11-08 DIAGNOSIS — E11.59 HYPERTENSION ASSOCIATED WITH DIABETES: ICD-10-CM

## 2021-11-08 DIAGNOSIS — Z74.09 NEED FOR ASSISTANCE DUE TO REDUCED MOBILITY: ICD-10-CM

## 2021-11-08 DIAGNOSIS — G20.A1 PARKINSON DISEASE: Primary | ICD-10-CM

## 2021-11-08 DIAGNOSIS — R53.1 WEAKNESS: ICD-10-CM

## 2021-11-08 DIAGNOSIS — Z51.5 PALLIATIVE CARE ENCOUNTER: ICD-10-CM

## 2021-11-08 DIAGNOSIS — I15.2 HYPERTENSION ASSOCIATED WITH DIABETES: ICD-10-CM

## 2021-11-08 DIAGNOSIS — Z79.4 TYPE 2 DIABETES MELLITUS WITH DIABETIC NEUROPATHY, WITH LONG-TERM CURRENT USE OF INSULIN: ICD-10-CM

## 2021-11-08 DIAGNOSIS — Z74.09 IMPAIRED MOBILITY AND ACTIVITIES OF DAILY LIVING: ICD-10-CM

## 2021-11-08 DIAGNOSIS — Z78.9 IMPAIRED MOBILITY AND ACTIVITIES OF DAILY LIVING: ICD-10-CM

## 2021-11-08 PROCEDURE — 99350 PR HOME VISIT,ESTAB PATIENT,LEVEL IV: ICD-10-PCS | Mod: S$GLB,,, | Performed by: NURSE PRACTITIONER

## 2021-11-08 PROCEDURE — 99350 HOME/RES VST EST HIGH MDM 60: CPT | Mod: S$GLB,,, | Performed by: NURSE PRACTITIONER

## 2021-12-28 ENCOUNTER — PATIENT MESSAGE (OUTPATIENT)
Dept: PRIMARY CARE CLINIC | Facility: CLINIC | Age: 81
End: 2021-12-28
Payer: MEDICARE

## 2022-01-10 ENCOUNTER — PATIENT MESSAGE (OUTPATIENT)
Dept: PRIMARY CARE CLINIC | Facility: CLINIC | Age: 82
End: 2022-01-10
Payer: MEDICARE

## 2022-01-11 RX ORDER — PEN NEEDLE, DIABETIC 30 GX3/16"
NEEDLE, DISPOSABLE MISCELLANEOUS
Qty: 300 EACH | Refills: 3 | Status: CANCELLED | OUTPATIENT
Start: 2022-01-11

## 2022-01-11 RX ORDER — PEN NEEDLE, DIABETIC 30 GX3/16"
NEEDLE, DISPOSABLE MISCELLANEOUS
Qty: 300 EACH | Refills: 3 | Status: SHIPPED | OUTPATIENT
Start: 2022-01-11 | End: 2022-01-12

## 2022-02-07 RX ORDER — GLIMEPIRIDE 4 MG/1
4 TABLET ORAL
Qty: 90 TABLET | Refills: 3 | Status: SHIPPED | OUTPATIENT
Start: 2022-02-07 | End: 2023-04-05 | Stop reason: SDUPTHER

## 2022-02-25 ENCOUNTER — PES CALL (OUTPATIENT)
Dept: ADMINISTRATIVE | Facility: CLINIC | Age: 82
End: 2022-02-25
Payer: MEDICARE

## 2022-03-10 ENCOUNTER — OFFICE VISIT (OUTPATIENT)
Dept: CARDIOLOGY | Facility: CLINIC | Age: 82
End: 2022-03-10
Payer: MEDICARE

## 2022-03-10 VITALS
RESPIRATION RATE: 16 BRPM | SYSTOLIC BLOOD PRESSURE: 126 MMHG | BODY MASS INDEX: 24.86 KG/M2 | WEIGHT: 164 LBS | HEART RATE: 79 BPM | OXYGEN SATURATION: 97 % | DIASTOLIC BLOOD PRESSURE: 80 MMHG | HEIGHT: 68 IN

## 2022-03-10 DIAGNOSIS — I15.2 HYPERTENSION ASSOCIATED WITH DIABETES: ICD-10-CM

## 2022-03-10 DIAGNOSIS — I48.20 CHRONIC ATRIAL FIBRILLATION: ICD-10-CM

## 2022-03-10 DIAGNOSIS — Z86.718 HISTORY OF DVT (DEEP VEIN THROMBOSIS): ICD-10-CM

## 2022-03-10 DIAGNOSIS — E11.59 HYPERTENSION ASSOCIATED WITH DIABETES: ICD-10-CM

## 2022-03-10 DIAGNOSIS — E11.40 TYPE 2 DIABETES MELLITUS WITH DIABETIC NEUROPATHY, WITH LONG-TERM CURRENT USE OF INSULIN: ICD-10-CM

## 2022-03-10 DIAGNOSIS — K21.9 GERD WITHOUT ESOPHAGITIS: ICD-10-CM

## 2022-03-10 DIAGNOSIS — E78.5 HYPERLIPIDEMIA ASSOCIATED WITH TYPE 2 DIABETES MELLITUS: ICD-10-CM

## 2022-03-10 DIAGNOSIS — E11.69 HYPERLIPIDEMIA ASSOCIATED WITH TYPE 2 DIABETES MELLITUS: ICD-10-CM

## 2022-03-10 DIAGNOSIS — Z79.4 TYPE 2 DIABETES MELLITUS WITH DIABETIC NEUROPATHY, WITH LONG-TERM CURRENT USE OF INSULIN: ICD-10-CM

## 2022-03-10 PROCEDURE — 1101F PR PT FALLS ASSESS DOC 0-1 FALLS W/OUT INJ PAST YR: ICD-10-PCS | Mod: CPTII,S$GLB,, | Performed by: INTERNAL MEDICINE

## 2022-03-10 PROCEDURE — 1159F PR MEDICATION LIST DOCUMENTED IN MEDICAL RECORD: ICD-10-PCS | Mod: CPTII,S$GLB,, | Performed by: INTERNAL MEDICINE

## 2022-03-10 PROCEDURE — 3288F FALL RISK ASSESSMENT DOCD: CPT | Mod: CPTII,S$GLB,, | Performed by: INTERNAL MEDICINE

## 2022-03-10 PROCEDURE — 3074F PR MOST RECENT SYSTOLIC BLOOD PRESSURE < 130 MM HG: ICD-10-PCS | Mod: CPTII,S$GLB,, | Performed by: INTERNAL MEDICINE

## 2022-03-10 PROCEDURE — 1101F PT FALLS ASSESS-DOCD LE1/YR: CPT | Mod: CPTII,S$GLB,, | Performed by: INTERNAL MEDICINE

## 2022-03-10 PROCEDURE — 99214 PR OFFICE/OUTPT VISIT, EST, LEVL IV, 30-39 MIN: ICD-10-PCS | Mod: S$GLB,,, | Performed by: INTERNAL MEDICINE

## 2022-03-10 PROCEDURE — 1126F PR PAIN SEVERITY QUANTIFIED, NO PAIN PRESENT: ICD-10-PCS | Mod: CPTII,S$GLB,, | Performed by: INTERNAL MEDICINE

## 2022-03-10 PROCEDURE — 3288F PR FALLS RISK ASSESSMENT DOCUMENTED: ICD-10-PCS | Mod: CPTII,S$GLB,, | Performed by: INTERNAL MEDICINE

## 2022-03-10 PROCEDURE — 3079F PR MOST RECENT DIASTOLIC BLOOD PRESSURE 80-89 MM HG: ICD-10-PCS | Mod: CPTII,S$GLB,, | Performed by: INTERNAL MEDICINE

## 2022-03-10 PROCEDURE — 1160F PR REVIEW ALL MEDS BY PRESCRIBER/CLIN PHARMACIST DOCUMENTED: ICD-10-PCS | Mod: CPTII,S$GLB,, | Performed by: INTERNAL MEDICINE

## 2022-03-10 PROCEDURE — 3074F SYST BP LT 130 MM HG: CPT | Mod: CPTII,S$GLB,, | Performed by: INTERNAL MEDICINE

## 2022-03-10 PROCEDURE — 1126F AMNT PAIN NOTED NONE PRSNT: CPT | Mod: CPTII,S$GLB,, | Performed by: INTERNAL MEDICINE

## 2022-03-10 PROCEDURE — 3079F DIAST BP 80-89 MM HG: CPT | Mod: CPTII,S$GLB,, | Performed by: INTERNAL MEDICINE

## 2022-03-10 PROCEDURE — 1160F RVW MEDS BY RX/DR IN RCRD: CPT | Mod: CPTII,S$GLB,, | Performed by: INTERNAL MEDICINE

## 2022-03-10 PROCEDURE — 1159F MED LIST DOCD IN RCRD: CPT | Mod: CPTII,S$GLB,, | Performed by: INTERNAL MEDICINE

## 2022-03-10 PROCEDURE — 99214 OFFICE O/P EST MOD 30 MIN: CPT | Mod: S$GLB,,, | Performed by: INTERNAL MEDICINE

## 2022-03-10 NOTE — ASSESSMENT & PLAN NOTE
Patient is doing very well at this time.  I have encouraged her to continue on same medication rate is controlled continue on Cartia  mg daily and Eliquis 5 mg twice a day.  Or there is no obvious bleeding tendencies encouraged to continue the same to exercise fall precautions.

## 2022-03-10 NOTE — ASSESSMENT & PLAN NOTE
Blood pressure is stable at 120 6/80 mmHg and a continue on Cartia as well as lisinopril 20 mg once daily.

## 2022-03-10 NOTE — ASSESSMENT & PLAN NOTE
She has history of dyslipidemia has not been any medicines lately.  Not sure if this is some intolerance

## 2022-03-10 NOTE — ASSESSMENT & PLAN NOTE
Diabetes is under control she is on Levemir FlexTouch insulin and also on glipizide Amaryl 4 mg before breakfast.

## 2022-03-10 NOTE — PROGRESS NOTES
Subjective:    Patient ID:  Radha Alas is a 81 y.o. female patient here for evaluation Atrial Fibrillation and Hypertension      History of Present Illness:       Patient is 81-year-old lady with history of for atrial fibrillation arterial hypertension and [place]'s type of [place] and Parkinson's disease is seeking to reestablish cardiovascular care with me.  She has no new symptoms of any palpitations no significant shortness of breath is noted she has been doing fairly well her mobility is markedly limited because of his advanced neurologic condition.  She mostly uses a Rollator where she has help to move from room to room are.  But should uses wheelchair any thing outside the house        Review of patient's allergies indicates:   Allergen Reactions    Glucophage  [metformin]      Other reaction(s): Diarrhea  Other reaction(s): Diarrhea    Sulfa (sulfonamide antibiotics) Itching     Other reaction(s): Itching  Other reaction(s): Itching       Past Medical History:   Diagnosis Date    Anemia     Atrial fibrillation     Chronic constipation     Deep vein thrombosis of left lower extremity     Diabetes mellitus     TYPE 2    Diverticulosis     Hypertension     Obesity     Parkinson disease     Peptic ulcer of stomach     Prolapse of female bladder, acquired     Rectocele     Uterine cancer 11-1-2011    STAGE 1-A GRADE 2     Past Surgical History:   Procedure Laterality Date    COLONOSCOPY  prior to 2000    COLONOSCOPY N/A 9/28/2016    Procedure: COLONOSCOPY;  Surgeon: Baljinder Ospina MD;  [facility];  Service: Endoscopy;  Laterality: N/A;    FLEXIBLE SIGMOIDOSCOPY  06/05/2016    [facility]- in media section: poor prep, diverticulosis noted with old clots, internal hemorrhoids otherwise normal findings- recommend outpatient colonoscopy    FOREARM SURGERY      right forearm surgery    HERNIA REPAIR      ROBOTIC ASST    HYSTERECTOMY  11/1/2001    [facility]/BSO--cancer    PELVIC AND  PARA-AORTIC LYMPH NODE DISSECTION      MN REMOVAL OF OVARY/TUBE(S)      ROBOTIC ASST    TUBAL LIGATION      UPPER GASTROINTESTINAL ENDOSCOPY       Social History     Tobacco Use    Smoking status: Never Smoker    Smokeless tobacco: Never Used   Substance Use Topics    Alcohol use: No     Alcohol/week: 0.0 standard drinks    Drug use: No        Review of Systems:    As noted in HPI in addition      REVIEW OF SYSTEMS  CARDIOVASCULAR: No recent chest pain, palpitations, arm, neck, or jaw pain  RESPIRATORY: No recent fever, cough chills, SOB or congestion  : No blood in the urine  GI: No Nausea, vomiting, constipation, diarrhea, blood, or reflux.  MUSCULOSKELETAL: No myalgias  NEURO: No lightheadedness or dizziness  EYES: No Double vision, blurry, vision or headache              Objective        Vitals:    03/10/22 1508   BP: 126/80   Pulse: 79   Resp: 16       LIPIDS - LAST 2   Lab Results   Component Value Date    CHOL 172 08/12/2020    CHOL 161 10/04/2019    HDL 62 08/12/2020    HDL 70 10/04/2019    LDLCALC 85.0 08/12/2020    LDLCALC 77.0 10/04/2019    TRIG 125 08/12/2020    TRIG 70 10/04/2019    CHOLHDL 36.0 08/12/2020    CHOLHDL 43.5 10/04/2019       CBC - LAST 2  Lab Results   Component Value Date    WBC 6.23 08/25/2021    WBC 6.00 08/12/2020    RBC 4.44 08/25/2021    RBC 4.46 08/12/2020    HGB 13.7 08/25/2021    HGB 13.8 08/12/2020    HCT 42.1 08/25/2021    HCT 43.5 08/12/2020    MCV 95 08/25/2021    MCV 98 08/12/2020    MCH 30.9 08/25/2021    MCH 30.9 08/12/2020    MCHC 32.5 08/25/2021    MCHC 31.7 (L) 08/12/2020    RDW 12.4 08/25/2021    RDW 12.8 08/12/2020     08/25/2021     08/12/2020    MPV 10.2 08/25/2021    MPV 11.0 08/12/2020    GRAN 3.4 08/25/2021    GRAN 54.7 08/25/2021    LYMPH 2.2 08/25/2021    LYMPH 35.6 08/25/2021    MONO 0.4 08/25/2021    MONO 6.7 08/25/2021    BASO 0.03 08/25/2021    BASO 0.03 08/12/2020    NRBC 0 08/25/2021    NRBC 0 08/12/2020       CHEMISTRY & LIVER  FUNCTION - LAST 2  Lab Results   Component Value Date     08/25/2021     08/12/2020    K 4.5 08/25/2021    K 3.6 08/12/2020     08/25/2021     08/12/2020    CO2 28 08/25/2021    CO2 26 08/12/2020    ANIONGAP 11 08/25/2021    ANIONGAP 13 08/12/2020    BUN 11 08/25/2021    BUN 10 08/12/2020    CREATININE 0.7 08/25/2021    CREATININE 0.7 08/12/2020    GLU 81 08/25/2021    GLU 71 08/12/2020    CALCIUM 9.5 08/25/2021    CALCIUM 9.3 08/12/2020    MG 1.8 08/25/2021    MG 1.9 04/22/2020    ALBUMIN 3.5 08/25/2021    ALBUMIN 3.7 08/12/2020    PROT 7.3 08/25/2021    PROT 7.5 08/12/2020    ALKPHOS 78 08/25/2021    ALKPHOS 87 08/12/2020    ALT 12 08/25/2021    ALT 10 08/12/2020    AST 18 08/25/2021    AST 14 08/12/2020    BILITOT 0.5 08/25/2021    BILITOT 0.5 08/12/2020        CARDIAC PROFILE - LAST 2  No results found for: BNP, CPK, CPKMB, LDH, TROPONINI     COAGULATION - LAST 2  Lab Results   Component Value Date    LABPT 19.1 (H) 04/20/2020    INR 1.7 04/20/2020    INR 1.5 (H) 10/06/2016    APTT 24.0 04/20/2020       ENDOCRINE & PSA - LAST 2  Lab Results   Component Value Date    HGBA1C 6.2 (H) 08/25/2021    HGBA1C 6.1 (H) 08/12/2020    MICROALBUR 4.0 09/12/2017    TSH 2.350 04/20/2020    TSH 0.666 10/04/2019        ECHOCARDIOGRAM RESULTS  No results found for this or any previous visit.      CURRENT/PREVIOUS VISIT EKG  Results for orders placed or performed during the hospital encounter of 04/20/20   EKG 12-lead    Collection Time: 04/20/20  8:09 AM    Narrative    Test Reason : R00.0,    Vent. Rate : 110 BPM     Atrial Rate : 110 BPM     P-R Int : 000 ms          QRS Dur : 082 ms      QT Int : 302 ms       P-R-T Axes : 000 017 062 degrees     QTc Int : 408 ms    Atrial fibrillation with rapid ventricular response  Nonspecific ST abnormality  Abnormal ECG  When compared with ECG of 06-OCT-2011 12:06,  Nonspecific T wave abnormality no longer evident in Inferior leads  Nonspecific T wave abnormality,  "improved in Lateral leads  Confirmed by Anoop Vail MD ([id number]) on 4/21/2020 11:10:19 AM    Referred By:             Confirmed By:Anoop Vail MD     No valid procedures specified.   No results found for this or any previous visit.    No valid procedures specified.    PHYSICAL EXAM  CONSTITUTIONAL: Well built, well nourished in no apparent distress  NECK: no carotid bruit, no JVD  LUNGS: CTA  CHEST WALL: no tenderness  HEART: regular rate and rhythm, S1, S2 normal, no murmur, click, rub or gallop   ABDOMEN: soft, non-tender; bowel sounds normal; no masses,  no organomegaly  EXTREMITIES: Extremities normal, no edema, no calf tenderness noted  NEURO: AAO X 3    I HAVE REVIEWED :    The vital signs, nurses notes, and all the pertinent radiology and labs.        Current Outpatient Medications   Medication Instructions    ACETAMINOPHEN (TYLENOL ARTHRITIS ORAL) Oral    busPIRone (BUSPAR) 7.5 mg, Oral, 3 times daily    CARTIA  mg, Oral, Nightly    DROPLET PEN NEEDLE 31 gauge x 3/16" Ndle USE  TO  ADMINISTER  INSULIN ONE TIME DAILY    ELIQUIS 5 mg Tab TK 1 T PO BID    glimepiride (AMARYL) 4 mg, Oral, Before breakfast    LEVEMIR FLEXTOUCH U-100 INSULN 20 Units, Subcutaneous, Nightly    lisinopriL (PRINIVIL,ZESTRIL) 20 MG tablet 1 TABLET ONCE DAILY    omeprazole (PRILOSEC) 40 mg, Oral, Daily    polyethylene glycol (GLYCOLAX) 17 gram/dose powder TK 17 GRAMS PO QD UTD    rasagiline (AZILECT) 0.5 mg, Oral, Daily    TRUE METRIX GLUCOSE TEST STRIP Strp Test glucose levels 2-3 daily for diabetes          Assessment & Plan     Chronic atrial fibrillation  Patient is doing very well at this time.  I have encouraged her to continue on same medication rate is controlled continue on Cartia  mg daily and Eliquis 5 mg twice a day.  Or there is no obvious bleeding tendencies encouraged to continue the same to exercise fall precautions.    Hypertension associated with diabetes  Blood pressure is stable at 120 6/80 " mmHg and a continue on Cartia as well as lisinopril 20 mg once daily.    Hyperlipidemia associated with type 2 diabetes mellitus  She has history of dyslipidemia has not been any medicines lately.  Not sure if this is some intolerance    History of DVT (deep vein thrombosis)  She has remote history of deep vein thrombosis and has been on are Eliquis long-term.    Type 2 diabetes mellitus with diabetic neuropathy, with long-term current use of insulin  Diabetes is under control she is on Levemir FlexTouch insulin and also on glipizide Amaryl 4 mg before breakfast.    GERD without esophagitis  She is using omeprazole 40 mg a day for the same continue this      Most recent blood work about 6 months ago have all reflected within normal range with a hemoglobin A1c of 6.2.    No follow-ups on file.

## 2022-03-21 ENCOUNTER — TELEPHONE (OUTPATIENT)
Dept: CARDIOLOGY | Facility: CLINIC | Age: 82
End: 2022-03-21
Payer: MEDICARE

## 2022-03-21 ENCOUNTER — PATIENT MESSAGE (OUTPATIENT)
Dept: PRIMARY CARE CLINIC | Facility: CLINIC | Age: 82
End: 2022-03-21
Payer: MEDICARE

## 2022-03-21 NOTE — TELEPHONE ENCOUNTER
----- Message from Fanny Wood sent at 3/19/2022 11:07 AM CDT -----  Contact: patient  Type: Needs Medical Advice  Who Called:  patient   Symptoms (please be specific):  trouble walking  How long has patient had these symptoms:    Pharmacy name and phone #:   Best Call Back Number:[phone]     Additional Information: requesting to have physical therapy in the home to help her walk

## 2022-03-21 NOTE — TELEPHONE ENCOUNTER
Spoke with pt, states she wants home health physical therapy to help wit ambulating. No referrals entered and no reference in last office visit note. Pt verbalized understanding and will call PCP for referral.

## 2022-03-23 ENCOUNTER — PATIENT MESSAGE (OUTPATIENT)
Dept: FAMILY MEDICINE | Facility: CLINIC | Age: 82
End: 2022-03-23
Payer: MEDICARE

## 2022-03-25 NOTE — TELEPHONE ENCOUNTER
MD Eduardo Alston Staff 12 minutes ago (12:46 PM)         She will need to come in for a visit, 40 min.   Can't order HH without being seen recently.     Message text         Call placed to patient. Spoke to patient and  (Isak) as phone was on speaker phone. Both were notified of need for appointment related to request for home health/PT orders. Also advised appointment necessary to establish care with new PCP in Dr Salguero's absence. Patient and Mr Isak verbalized understanding. States unable to schedule appointment at time of call. Will call back when able to schedule appointment.

## 2022-03-29 ENCOUNTER — PATIENT MESSAGE (OUTPATIENT)
Dept: FAMILY MEDICINE | Facility: CLINIC | Age: 82
End: 2022-03-29
Payer: MEDICARE

## 2022-03-29 DIAGNOSIS — E11.3393 CONTROLLED TYPE 2 DIABETES MELLITUS WITH BOTH EYES AFFECTED BY MODERATE NONPROLIFERATIVE RETINOPATHY WITHOUT MACULAR EDEMA, WITH LONG-TERM CURRENT USE OF INSULIN: ICD-10-CM

## 2022-03-29 DIAGNOSIS — Z79.4 CONTROLLED TYPE 2 DIABETES MELLITUS WITH BOTH EYES AFFECTED BY MODERATE NONPROLIFERATIVE RETINOPATHY WITHOUT MACULAR EDEMA, WITH LONG-TERM CURRENT USE OF INSULIN: ICD-10-CM

## 2022-03-30 DIAGNOSIS — E11.3393 CONTROLLED TYPE 2 DIABETES MELLITUS WITH BOTH EYES AFFECTED BY MODERATE NONPROLIFERATIVE RETINOPATHY WITHOUT MACULAR EDEMA, WITH LONG-TERM CURRENT USE OF INSULIN: ICD-10-CM

## 2022-03-30 DIAGNOSIS — Z79.4 CONTROLLED TYPE 2 DIABETES MELLITUS WITH BOTH EYES AFFECTED BY MODERATE NONPROLIFERATIVE RETINOPATHY WITHOUT MACULAR EDEMA, WITH LONG-TERM CURRENT USE OF INSULIN: ICD-10-CM

## 2022-03-30 DIAGNOSIS — G20.A1 PARKINSON DISEASE: ICD-10-CM

## 2022-03-30 RX ORDER — INSULIN DETEMIR 100 [IU]/ML
20 INJECTION, SOLUTION SUBCUTANEOUS NIGHTLY
Qty: 5 EACH | Refills: 3 | Status: CANCELLED | OUTPATIENT
Start: 2022-03-30 | End: 2022-04-29

## 2022-03-30 RX ORDER — CALCIUM CITRATE/VITAMIN D3 200MG-6.25
TABLET ORAL
Qty: 200 STRIP | Refills: 3 | Status: CANCELLED | OUTPATIENT
Start: 2022-03-30

## 2022-03-30 RX ORDER — APIXABAN 5 MG/1
TABLET, FILM COATED ORAL
Qty: 180 TABLET | Refills: 3 | Status: CANCELLED | OUTPATIENT
Start: 2022-03-30

## 2022-03-30 RX ORDER — DILTIAZEM HYDROCHLORIDE 120 MG/1
120 CAPSULE, EXTENDED RELEASE ORAL NIGHTLY
Qty: 90 CAPSULE | Refills: 3 | Status: CANCELLED | OUTPATIENT
Start: 2022-03-30

## 2022-03-30 NOTE — TELEPHONE ENCOUNTER
"Pt will est care with you on 04/29/2022 . Per my chart message :   "[employer] has suggested switching from Rasagiline Mesylate  to Selegiline HCL.  Therefore the prescription for Selegiline HCL will be a new prescription."   Please advise, thank you ! ( Mail order pharmacy)     "

## 2022-03-30 NOTE — TELEPHONE ENCOUNTER
Care Due:                  Date            Visit Type   Department     Provider  --------------------------------------------------------------------------------    Last Visit: None Found      None         None Found  Next Visit: None Scheduled  None         None Found                                                            Last  Test          Frequency    Reason                     Performed    Due Date  --------------------------------------------------------------------------------    Office Visit  12 months..  glimepiride..............  Not Found    Overdue    HBA1C.......  6 months...  glimepiride..............  08- 02-    Powered by [employer]. Reference number: [id number].   3/30/2022 3:30:07 PM CDT

## 2022-03-31 RX ORDER — DILTIAZEM HYDROCHLORIDE 120 MG/1
120 CAPSULE, EXTENDED RELEASE ORAL NIGHTLY
Qty: 90 CAPSULE | Refills: 3 | Status: SHIPPED | OUTPATIENT
Start: 2022-03-31 | End: 2022-04-04 | Stop reason: SDUPTHER

## 2022-03-31 RX ORDER — CALCIUM CITRATE/VITAMIN D3 200MG-6.25
TABLET ORAL
Qty: 200 STRIP | Refills: 3 | Status: SHIPPED | OUTPATIENT
Start: 2022-03-31 | End: 2022-08-30 | Stop reason: SDUPTHER

## 2022-03-31 RX ORDER — INSULIN DETEMIR 100 [IU]/ML
20 INJECTION, SOLUTION SUBCUTANEOUS NIGHTLY
Qty: 5 EACH | Refills: 3 | Status: SHIPPED | OUTPATIENT
Start: 2022-03-31 | End: 2022-09-08

## 2022-03-31 RX ORDER — RASAGILINE 0.5 MG/1
0.5 TABLET ORAL DAILY
Qty: 90 TABLET | Refills: 3 | Status: SHIPPED | OUTPATIENT
Start: 2022-03-31 | End: 2022-04-01

## 2022-03-31 RX ORDER — APIXABAN 5 MG/1
TABLET, FILM COATED ORAL
Qty: 180 TABLET | Refills: 3 | Status: SHIPPED | OUTPATIENT
Start: 2022-03-31 | End: 2023-04-19

## 2022-04-01 ENCOUNTER — PATIENT MESSAGE (OUTPATIENT)
Dept: FAMILY MEDICINE | Facility: CLINIC | Age: 82
End: 2022-04-01
Payer: MEDICARE

## 2022-04-01 DIAGNOSIS — G20.A1 PARKINSON DISEASE: Primary | ICD-10-CM

## 2022-04-01 RX ORDER — SELEGILINE HYDROCHLORIDE 5 MG/1
5 CAPSULE ORAL 2 TIMES DAILY
Qty: 60 CAPSULE | Refills: 11 | Status: SHIPPED | OUTPATIENT
Start: 2022-04-01 | End: 2022-04-29 | Stop reason: SDUPTHER

## 2022-04-04 RX ORDER — DILTIAZEM HYDROCHLORIDE 120 MG/1
120 CAPSULE, COATED, EXTENDED RELEASE ORAL NIGHTLY
Qty: 90 CAPSULE | Refills: 3 | Status: SHIPPED | OUTPATIENT
Start: 2022-04-04 | End: 2023-01-23 | Stop reason: SDUPTHER

## 2022-04-04 NOTE — TELEPHONE ENCOUNTER
Rx was sent to [facility] pharmacy for Brand Cartia XT on 3/31/22.   Received fax today advising that Cartia XT is no longer available. Pharmacy requesting for med to be changed to generic diltiazem er 120 mg.  Phoned pharmacy and confirmed that diltiazem er 120 mg is what has been dispensed to the patient in the past.  Advised pharmacy that change to generic is ok. Please sign phone in order.

## 2022-04-04 NOTE — TELEPHONE ENCOUNTER
No new care gaps identified.  Powered by [employer]. Reference number: [id number].   4/04/2022 10:43:10 AM CDT

## 2022-04-29 ENCOUNTER — OFFICE VISIT (OUTPATIENT)
Dept: FAMILY MEDICINE | Facility: CLINIC | Age: 82
End: 2022-04-29
Payer: MEDICARE

## 2022-04-29 ENCOUNTER — TELEPHONE (OUTPATIENT)
Dept: PULMONOLOGY | Facility: CLINIC | Age: 82
End: 2022-04-29
Payer: MEDICARE

## 2022-04-29 ENCOUNTER — TELEPHONE (OUTPATIENT)
Dept: FAMILY MEDICINE | Facility: CLINIC | Age: 82
End: 2022-04-29

## 2022-04-29 ENCOUNTER — LAB VISIT (OUTPATIENT)
Dept: LAB | Facility: HOSPITAL | Age: 82
End: 2022-04-29
Attending: STUDENT IN AN ORGANIZED HEALTH CARE EDUCATION/TRAINING PROGRAM
Payer: MEDICARE

## 2022-04-29 VITALS
HEART RATE: 91 BPM | HEIGHT: 68 IN | WEIGHT: 211.63 LBS | SYSTOLIC BLOOD PRESSURE: 132 MMHG | DIASTOLIC BLOOD PRESSURE: 84 MMHG | OXYGEN SATURATION: 95 % | BODY MASS INDEX: 32.07 KG/M2 | RESPIRATION RATE: 18 BRPM

## 2022-04-29 DIAGNOSIS — G20.A1 PARKINSON DISEASE: ICD-10-CM

## 2022-04-29 DIAGNOSIS — E11.40 TYPE 2 DIABETES MELLITUS WITH DIABETIC NEUROPATHY, WITH LONG-TERM CURRENT USE OF INSULIN: Primary | ICD-10-CM

## 2022-04-29 DIAGNOSIS — I48.20 CHRONIC ATRIAL FIBRILLATION: ICD-10-CM

## 2022-04-29 DIAGNOSIS — E11.40 TYPE 2 DIABETES MELLITUS WITH DIABETIC NEUROPATHY, WITH LONG-TERM CURRENT USE OF INSULIN: ICD-10-CM

## 2022-04-29 DIAGNOSIS — Z79.4 TYPE 2 DIABETES MELLITUS WITH DIABETIC NEUROPATHY, WITH LONG-TERM CURRENT USE OF INSULIN: ICD-10-CM

## 2022-04-29 DIAGNOSIS — I15.2 HYPERTENSION ASSOCIATED WITH DIABETES: ICD-10-CM

## 2022-04-29 DIAGNOSIS — Z79.4 TYPE 2 DIABETES MELLITUS WITH DIABETIC NEUROPATHY, WITH LONG-TERM CURRENT USE OF INSULIN: Primary | ICD-10-CM

## 2022-04-29 DIAGNOSIS — E11.59 HYPERTENSION ASSOCIATED WITH DIABETES: ICD-10-CM

## 2022-04-29 LAB
ALBUMIN SERPL BCP-MCNC: 3.3 G/DL (ref 3.5–5.2)
ALP SERPL-CCNC: 81 U/L (ref 55–135)
ALT SERPL W/O P-5'-P-CCNC: 11 U/L (ref 10–44)
ANION GAP SERPL CALC-SCNC: 10 MMOL/L (ref 8–16)
AST SERPL-CCNC: 16 U/L (ref 10–40)
BILIRUB SERPL-MCNC: 0.5 MG/DL (ref 0.1–1)
BUN SERPL-MCNC: 13 MG/DL (ref 8–23)
CALCIUM SERPL-MCNC: 9.4 MG/DL (ref 8.7–10.5)
CHLORIDE SERPL-SCNC: 103 MMOL/L (ref 95–110)
CO2 SERPL-SCNC: 26 MMOL/L (ref 23–29)
CREAT SERPL-MCNC: 0.7 MG/DL (ref 0.5–1.4)
EST. GFR  (AFRICAN AMERICAN): >60 ML/MIN/1.73 M^2
EST. GFR  (NON AFRICAN AMERICAN): >60 ML/MIN/1.73 M^2
ESTIMATED AVG GLUCOSE: 163 MG/DL (ref 68–131)
GLUCOSE SERPL-MCNC: 170 MG/DL (ref 70–110)
HBA1C MFR BLD: 7.3 % (ref 4–5.6)
POTASSIUM SERPL-SCNC: 4 MMOL/L (ref 3.5–5.1)
PROT SERPL-MCNC: 7.2 G/DL (ref 6–8.4)
SODIUM SERPL-SCNC: 139 MMOL/L (ref 136–145)

## 2022-04-29 PROCEDURE — 3075F PR MOST RECENT SYSTOLIC BLOOD PRESS GE 130-139MM HG: ICD-10-PCS | Mod: CPTII,S$GLB,, | Performed by: STUDENT IN AN ORGANIZED HEALTH CARE EDUCATION/TRAINING PROGRAM

## 2022-04-29 PROCEDURE — 3079F PR MOST RECENT DIASTOLIC BLOOD PRESSURE 80-89 MM HG: ICD-10-PCS | Mod: CPTII,S$GLB,, | Performed by: STUDENT IN AN ORGANIZED HEALTH CARE EDUCATION/TRAINING PROGRAM

## 2022-04-29 PROCEDURE — 3051F PR MOST RECENT HEMOGLOBIN A1C LEVEL 7.0 - < 8.0%: ICD-10-PCS | Mod: CPTII,S$GLB,, | Performed by: STUDENT IN AN ORGANIZED HEALTH CARE EDUCATION/TRAINING PROGRAM

## 2022-04-29 PROCEDURE — 1159F MED LIST DOCD IN RCRD: CPT | Mod: CPTII,S$GLB,, | Performed by: STUDENT IN AN ORGANIZED HEALTH CARE EDUCATION/TRAINING PROGRAM

## 2022-04-29 PROCEDURE — 99214 OFFICE O/P EST MOD 30 MIN: CPT | Mod: S$GLB,,, | Performed by: STUDENT IN AN ORGANIZED HEALTH CARE EDUCATION/TRAINING PROGRAM

## 2022-04-29 PROCEDURE — 99214 PR OFFICE/OUTPT VISIT, EST, LEVL IV, 30-39 MIN: ICD-10-PCS | Mod: S$GLB,,, | Performed by: STUDENT IN AN ORGANIZED HEALTH CARE EDUCATION/TRAINING PROGRAM

## 2022-04-29 PROCEDURE — 3051F HG A1C>EQUAL 7.0%<8.0%: CPT | Mod: CPTII,S$GLB,, | Performed by: STUDENT IN AN ORGANIZED HEALTH CARE EDUCATION/TRAINING PROGRAM

## 2022-04-29 PROCEDURE — 3075F SYST BP GE 130 - 139MM HG: CPT | Mod: CPTII,S$GLB,, | Performed by: STUDENT IN AN ORGANIZED HEALTH CARE EDUCATION/TRAINING PROGRAM

## 2022-04-29 PROCEDURE — 1126F AMNT PAIN NOTED NONE PRSNT: CPT | Mod: CPTII,S$GLB,, | Performed by: STUDENT IN AN ORGANIZED HEALTH CARE EDUCATION/TRAINING PROGRAM

## 2022-04-29 PROCEDURE — 1159F PR MEDICATION LIST DOCUMENTED IN MEDICAL RECORD: ICD-10-PCS | Mod: CPTII,S$GLB,, | Performed by: STUDENT IN AN ORGANIZED HEALTH CARE EDUCATION/TRAINING PROGRAM

## 2022-04-29 PROCEDURE — 36415 COLL VENOUS BLD VENIPUNCTURE: CPT | Mod: PO | Performed by: STUDENT IN AN ORGANIZED HEALTH CARE EDUCATION/TRAINING PROGRAM

## 2022-04-29 PROCEDURE — 1160F RVW MEDS BY RX/DR IN RCRD: CPT | Mod: CPTII,S$GLB,, | Performed by: STUDENT IN AN ORGANIZED HEALTH CARE EDUCATION/TRAINING PROGRAM

## 2022-04-29 PROCEDURE — 1101F PT FALLS ASSESS-DOCD LE1/YR: CPT | Mod: CPTII,S$GLB,, | Performed by: STUDENT IN AN ORGANIZED HEALTH CARE EDUCATION/TRAINING PROGRAM

## 2022-04-29 PROCEDURE — 3288F PR FALLS RISK ASSESSMENT DOCUMENTED: ICD-10-PCS | Mod: CPTII,S$GLB,, | Performed by: STUDENT IN AN ORGANIZED HEALTH CARE EDUCATION/TRAINING PROGRAM

## 2022-04-29 PROCEDURE — 1160F PR REVIEW ALL MEDS BY PRESCRIBER/CLIN PHARMACIST DOCUMENTED: ICD-10-PCS | Mod: CPTII,S$GLB,, | Performed by: STUDENT IN AN ORGANIZED HEALTH CARE EDUCATION/TRAINING PROGRAM

## 2022-04-29 PROCEDURE — 83036 HEMOGLOBIN GLYCOSYLATED A1C: CPT | Performed by: STUDENT IN AN ORGANIZED HEALTH CARE EDUCATION/TRAINING PROGRAM

## 2022-04-29 PROCEDURE — 99999 PR PBB SHADOW E&M-EST. PATIENT-LVL V: CPT | Mod: PBBFAC,,, | Performed by: STUDENT IN AN ORGANIZED HEALTH CARE EDUCATION/TRAINING PROGRAM

## 2022-04-29 PROCEDURE — 1126F PR PAIN SEVERITY QUANTIFIED, NO PAIN PRESENT: ICD-10-PCS | Mod: CPTII,S$GLB,, | Performed by: STUDENT IN AN ORGANIZED HEALTH CARE EDUCATION/TRAINING PROGRAM

## 2022-04-29 PROCEDURE — 99999 PR PBB SHADOW E&M-EST. PATIENT-LVL V: ICD-10-PCS | Mod: PBBFAC,,, | Performed by: STUDENT IN AN ORGANIZED HEALTH CARE EDUCATION/TRAINING PROGRAM

## 2022-04-29 PROCEDURE — 3288F FALL RISK ASSESSMENT DOCD: CPT | Mod: CPTII,S$GLB,, | Performed by: STUDENT IN AN ORGANIZED HEALTH CARE EDUCATION/TRAINING PROGRAM

## 2022-04-29 PROCEDURE — 3079F DIAST BP 80-89 MM HG: CPT | Mod: CPTII,S$GLB,, | Performed by: STUDENT IN AN ORGANIZED HEALTH CARE EDUCATION/TRAINING PROGRAM

## 2022-04-29 PROCEDURE — 1101F PR PT FALLS ASSESS DOC 0-1 FALLS W/OUT INJ PAST YR: ICD-10-PCS | Mod: CPTII,S$GLB,, | Performed by: STUDENT IN AN ORGANIZED HEALTH CARE EDUCATION/TRAINING PROGRAM

## 2022-04-29 PROCEDURE — 85025 COMPLETE CBC W/AUTO DIFF WBC: CPT | Performed by: STUDENT IN AN ORGANIZED HEALTH CARE EDUCATION/TRAINING PROGRAM

## 2022-04-29 PROCEDURE — 80053 COMPREHEN METABOLIC PANEL: CPT | Performed by: STUDENT IN AN ORGANIZED HEALTH CARE EDUCATION/TRAINING PROGRAM

## 2022-04-29 RX ORDER — CARBIDOPA AND LEVODOPA 25; 100 MG/1; MG/1
1 TABLET, EXTENDED RELEASE ORAL 2 TIMES DAILY
Qty: 60 TABLET | Refills: 11 | Status: SHIPPED | OUTPATIENT
Start: 2022-04-29 | End: 2022-09-19

## 2022-04-29 RX ORDER — SELEGILINE HYDROCHLORIDE 5 MG/1
5 CAPSULE ORAL 2 TIMES DAILY
Qty: 60 CAPSULE | Refills: 11 | Status: SHIPPED | OUTPATIENT
Start: 2022-04-29 | End: 2023-04-19

## 2022-04-29 NOTE — TELEPHONE ENCOUNTER
Pt seen in clinic today by Dr. Clark, can you please assist with an appointment. Thank you so much !

## 2022-04-29 NOTE — TELEPHONE ENCOUNTER
----- Message from Doron Garcia sent at 4/29/2022  6:14 AM CDT -----  Contact: Patient  The following request was sent from the pt portal    Appointment Request From: Radha Alas    With Provider: Desi Salguero MD [[facility]]    Preferred Date Range: Any    Preferred Times: Any Time    Reason for visit: Primary Care    Comments:  Having headaches    The pt can be reached at [phone] or [phone]

## 2022-04-30 LAB
BASOPHILS # BLD AUTO: 0.04 K/UL (ref 0–0.2)
BASOPHILS NFR BLD: 0.5 % (ref 0–1.9)
DIFFERENTIAL METHOD: ABNORMAL
EOSINOPHIL # BLD AUTO: 0.1 K/UL (ref 0–0.5)
EOSINOPHIL NFR BLD: 0.8 % (ref 0–8)
ERYTHROCYTE [DISTWIDTH] IN BLOOD BY AUTOMATED COUNT: 12.7 % (ref 11.5–14.5)
HCT VFR BLD AUTO: 42.1 % (ref 37–48.5)
HGB BLD-MCNC: 13.4 G/DL (ref 12–16)
IMM GRANULOCYTES # BLD AUTO: 0.02 K/UL (ref 0–0.04)
IMM GRANULOCYTES NFR BLD AUTO: 0.2 % (ref 0–0.5)
LYMPHOCYTES # BLD AUTO: 2.8 K/UL (ref 1–4.8)
LYMPHOCYTES NFR BLD: 32.8 % (ref 18–48)
MCH RBC QN AUTO: 29.5 PG (ref 27–31)
MCHC RBC AUTO-ENTMCNC: 31.8 G/DL (ref 32–36)
MCV RBC AUTO: 93 FL (ref 82–98)
MONOCYTES # BLD AUTO: 0.5 K/UL (ref 0.3–1)
MONOCYTES NFR BLD: 5.7 % (ref 4–15)
NEUTROPHILS # BLD AUTO: 5.1 K/UL (ref 1.8–7.7)
NEUTROPHILS NFR BLD: 60 % (ref 38–73)
NRBC BLD-RTO: 0 /100 WBC
PLATELET # BLD AUTO: 327 K/UL (ref 150–450)
PMV BLD AUTO: 10.1 FL (ref 9.2–12.9)
RBC # BLD AUTO: 4.54 M/UL (ref 4–5.4)
WBC # BLD AUTO: 8.54 K/UL (ref 3.9–12.7)

## 2022-05-02 NOTE — PROGRESS NOTES
[facility] MEDICINE CLINIC NOTE    Patient Name: Radha Alas  YOB: 1940    PRESENTING HISTORY   Chief Complaint:   Chief Complaint   Patient presents with    Establish Care        History of Present Illness:  Ms. Radha Alas is a 81 y.o. female here to establish care.     Hx Parkinson's Disease which has been debilitating. Has not seen a neurologist in some time.   Previously tried PT without significant improvement.   Her goal is to walk, which she has not done in some time.     I was not able to perform complete skin exam today but family reports no sig skin breakdown at this time.       PD regimen is selegeline monotherapy. I will try resuming Sinemet. Discussed that missed doses are dangerous and they agree to adhere to schedule.     Would like her to resume seeing a neurologist.      T2DM- goal A1c will be under 8 due to debility. Family titrates her insulin and she gets no low readings.                                              Review of Systems   All other systems reviewed and are negative.        PAST HISTORY:     Past Medical History:   Diagnosis Date    Anemia     Atrial fibrillation     Chronic constipation     Deep vein thrombosis of left lower extremity     Diabetes mellitus     TYPE 2    Diverticulosis     Hypertension     Obesity     Parkinson disease     Peptic ulcer of stomach     Prolapse of female bladder, acquired     Rectocele     Uterine cancer 11-1-2011    STAGE 1-A GRADE 2       Past Surgical History:   Procedure Laterality Date    COLONOSCOPY  prior to 2000    COLONOSCOPY N/A 9/28/2016    Procedure: COLONOSCOPY;  Surgeon: Baljinder Ospina MD;  Location: [facility];  Service: Endoscopy;  Laterality: N/A;    FLEXIBLE SIGMOIDOSCOPY  06/05/2016    at [facility]- in media section: poor prep, diverticulosis noted with old clots, internal hemorrhoids otherwise normal findings- recommend outpatient colonoscopy    FOREARM SURGERY      right forearm surgery     HERNIA REPAIR      ROBOTIC ASST    HYSTERECTOMY  11/1/2001    [facility]--cancer    PELVIC AND PARA-AORTIC LYMPH NODE DISSECTION      WI REMOVAL OF OVARY/TUBE(S)      ROBOTIC ASST    TUBAL LIGATION      UPPER GASTROINTESTINAL ENDOSCOPY         Family History   Problem Relation Age of Onset    Heart disease Mother     Diabetes Mother     Hypertension Father     Heart disease Sister     Diabetes Maternal Grandmother     Hypertension Maternal Grandmother     No Known Problems Brother     No Known Problems Daughter     No Known Problems Son     No Known Problems Maternal Aunt     No Known Problems Maternal Uncle     No Known Problems Paternal Aunt     No Known Problems Paternal Uncle     No Known Problems Maternal Grandfather     No Known Problems Paternal Grandmother     No Known Problems Paternal Grandfather     Breast cancer Neg Hx     Ovarian cancer Neg Hx     Uterine cancer Neg Hx     Colon cancer Neg Hx     Cancer Neg Hx     Cervical cancer Neg Hx     Endometrial cancer Neg Hx     Vaginal cancer Neg Hx     Colon polyps Neg Hx     Crohn's disease Neg Hx     Ulcerative colitis Neg Hx        Social History     Socioeconomic History    Marital status:      Spouse name: Isak Alas   Occupational History    Occupation: retired teacher   Tobacco Use    Smoking status: Never Smoker    Smokeless tobacco: Never Used   Substance and Sexual Activity    Alcohol use: No     Alcohol/week: 0.0 standard drinks    Drug use: No    Sexual activity: Not Currently     Social Determinants of Health     Financial Resource Strain: Low Risk     Difficulty of Paying Living Expenses: Not hard at all   Food Insecurity: No Food Insecurity    Worried About Running Out of Food in the Last Year: Never true    Ran Out of Food in the Last Year: Never true   Transportation Needs: No Transportation Needs    Lack of Transportation (Medical): No    Lack of Transportation (Non-Medical): No   Physical  "Activity: Inactive    Days of Exercise per Week: 0 days    Minutes of Exercise per Session: 0 min   Stress: No Stress Concern Present    Feeling of Stress : Not at all   Social Connections: Unknown    Frequency of Communication with Friends and Family: More than three times a week    Frequency of Social Gatherings with Friends and Family: Twice a week    Active Member of Clubs or Organizations: Yes    Attends Club or Organization Meetings: 1 to 4 times per year    Marital Status:    Housing Stability: Low Risk     Unable to Pay for Housing in the Last Year: No    Number of Places Lived in the Last Year: 1    Unstable Housing in the Last Year: No       MEDICATIONS & ALLERGIES:     Current Outpatient Medications on File Prior to Visit   Medication Sig    ACETAMINOPHEN (TYLENOL ARTHRITIS ORAL) Take by mouth.    busPIRone (BUSPAR) 7.5 MG tablet Take 1 tablet (7.5 mg total) by mouth 3 (three) times daily.    diltiaZEM (CARTIA XT) 120 MG Cp24 Take 1 capsule (120 mg total) by mouth every evening.    DROPLET PEN NEEDLE 31 gauge x 3/16" Ndle USE  TO  ADMINISTER  INSULIN ONE TIME DAILY    ELIQUIS 5 mg Tab TK 1 T PO BID    glimepiride (AMARYL) 4 MG tablet Take 1 tablet (4 mg total) by mouth before breakfast.    insulin detemir U-100 (LEVEMIR FLEXTOUCH U-100 INSULN) 100 unit/mL (3 mL) InPn pen Inject 20 Units into the skin every evening.    lisinopriL (PRINIVIL,ZESTRIL) 20 MG tablet 1 TABLET ONCE DAILY    polyethylene glycol (GLYCOLAX) 17 gram/dose powder TK 17 GRAMS PO QD UTD    TRUE METRIX GLUCOSE TEST STRIP Strp Test glucose levels 2-3 daily for diabetes    omeprazole (PRILOSEC) 40 MG capsule Take 1 capsule (40 mg total) by mouth once daily.     No current facility-administered medications on file prior to visit.       Review of patient's allergies indicates:   Allergen Reactions    Glucophage  [metformin]      Other reaction(s): Diarrhea  Other reaction(s): Diarrhea    Sulfa (sulfonamide " "antibiotics) Itching     Other reaction(s): Itching  Other reaction(s): Itching       OBJECTIVE:   Vital Signs:  Vitals:    04/29/22 1500   BP: 132/84   Pulse: 91   Resp: 18   SpO2: 95%   Weight: 96 kg (211 lb 10.3 oz)   Height: 5' 8" (1.727 m)       No results found for this or any previous visit (from the past 24 hour(s)).      Physical Exam  Vitals and nursing note reviewed.   Constitutional:       Appearance: She is obese.      Comments: Comes to clinic in a wheelchair.    HENT:      Head: Normocephalic and atraumatic.      Right Ear: External ear normal.      Left Ear: External ear normal.   Eyes:      General: No scleral icterus.     Conjunctiva/sclera: Conjunctivae normal.      Pupils: Pupils are equal, round, and reactive to light.   Neck:      Thyroid: No thyromegaly.      Vascular: No carotid bruit.   Cardiovascular:      Rate and Rhythm: Normal rate. Rhythm irregular.      Heart sounds: Normal heart sounds. No murmur heard.  Pulmonary:      Effort: Pulmonary effort is normal. No respiratory distress.      Breath sounds: Normal breath sounds. No wheezing or rales.   Musculoskeletal:         General: No tenderness or deformity. Normal range of motion.      Cervical back: Normal range of motion and neck supple.      Right lower leg: No edema.      Left lower leg: No edema.   Lymphadenopathy:      Cervical: No cervical adenopathy.   Skin:     General: Skin is warm and dry.      Findings: No erythema or rash.   Neurological:      Mental Status: She is alert.      Gait: Gait is intact.      Comments: Markedly hypertonic. + Cogwheeling. Moderate resting tremor.    Psychiatric:         Mood and Affect: Affect normal.         Cognition and Memory: Memory normal.         ASSESSMENT & PLAN:     Type 2 diabetes mellitus with diabetic neuropathy, with long-term current use of insulin  -     CBC Auto Differential; Future; Expected date: 04/29/2022  -     Hemoglobin A1C; Future; Expected date: 04/29/2022  -     " Comprehensive Metabolic Panel; Future; Expected date: 04/29/2022  -     Microalbumin/Creatinine Ratio, Urine; Future; Expected date: 04/29/2022    Parkinson disease  -     selegiline (ELDEPRYL) 5 mg Cap; Take 1 capsule (5 mg total) by mouth 2 (two) times daily.  Dispense: 60 capsule; Refill: 11  -     carbidopa-levodopa  mg (SINEMET CR)  mg TbSR; Take 1 tablet by mouth 2 (two) times daily.  Dispense: 60 tablet; Refill: 11  -     Ambulatory referral/consult to Neurology; Future; Expected date: 05/06/2022  -     Ambulatory referral/consult to [facility]; Future; Expected date: 05/06/2022  -     CBC Auto Differential; Future; Expected date: 04/29/2022    Chronic atrial fibrillation  Continue current medicines    Hypertension associated with diabetes  Well controlled, Continue current medicines           Prabhu Clark MD   Internal Medicine    This note was created using Dragon voice recognition software that occasionally misinterprets phrases or words.

## 2022-05-04 ENCOUNTER — PES CALL (OUTPATIENT)
Dept: ADMINISTRATIVE | Facility: CLINIC | Age: 82
End: 2022-05-04
Payer: MEDICARE

## 2022-05-04 ENCOUNTER — LAB VISIT (OUTPATIENT)
Dept: LAB | Facility: HOSPITAL | Age: 82
End: 2022-05-04
Attending: STUDENT IN AN ORGANIZED HEALTH CARE EDUCATION/TRAINING PROGRAM
Payer: MEDICARE

## 2022-05-04 DIAGNOSIS — Z79.4 TYPE 2 DIABETES MELLITUS WITH DIABETIC NEUROPATHY, WITH LONG-TERM CURRENT USE OF INSULIN: ICD-10-CM

## 2022-05-04 DIAGNOSIS — E11.40 TYPE 2 DIABETES MELLITUS WITH DIABETIC NEUROPATHY, WITH LONG-TERM CURRENT USE OF INSULIN: ICD-10-CM

## 2022-05-04 LAB
ALBUMIN/CREAT UR: NORMAL UG/MG (ref 0–30)
CREAT UR-MCNC: 39 MG/DL (ref 15–325)
MICROALBUMIN UR DL<=1MG/L-MCNC: <5 UG/ML

## 2022-05-04 PROCEDURE — 82570 ASSAY OF URINE CREATININE: CPT | Performed by: STUDENT IN AN ORGANIZED HEALTH CARE EDUCATION/TRAINING PROGRAM

## 2022-05-06 ENCOUNTER — TELEPHONE (OUTPATIENT)
Dept: NEUROLOGY | Facility: CLINIC | Age: 82
End: 2022-05-06
Payer: MEDICARE

## 2022-05-06 NOTE — TELEPHONE ENCOUNTER
----- Message from Tamara Palma sent at 5/6/2022  2:24 PM CDT -----  Regarding: return call  Type:  Patient Returning Call    Who Called:( vince  )ERASTO WEST [[id number]]    Who Left Message for Patient:Jessica    Does the patient know what this is regarding?appt    Best Call Back Number:[phone]

## 2022-05-06 NOTE — TELEPHONE ENCOUNTER
Attempted to contact patient to get her scheduled with a movement provider on the [place] unable to leave message voicemail not set up.

## 2022-05-06 NOTE — TELEPHONE ENCOUNTER
LM for patient spouse to contact the clinic to get patient scheduled for a visit with a movement provider. Left contact information on the voicemail.

## 2022-05-09 NOTE — TELEPHONE ENCOUNTER
Called and spoke with patient  Isak and scheduled patient for a virtual visit with Dr. Rodriguez. Patient  stated he was aware of how to do a virtual visit and accepted appointment.

## 2022-05-11 ENCOUNTER — CARE AT HOME (OUTPATIENT)
Dept: HOME HEALTH SERVICES | Facility: CLINIC | Age: 82
End: 2022-05-11
Payer: MEDICARE

## 2022-05-11 DIAGNOSIS — H61.22 HEARING LOSS OF LEFT EAR DUE TO CERUMEN IMPACTION: ICD-10-CM

## 2022-05-11 DIAGNOSIS — Z78.9 IMPAIRED MOBILITY AND ACTIVITIES OF DAILY LIVING: ICD-10-CM

## 2022-05-11 DIAGNOSIS — Z74.09 IMPAIRED MOBILITY AND ACTIVITIES OF DAILY LIVING: ICD-10-CM

## 2022-05-11 DIAGNOSIS — R53.1 WEAKNESS: Primary | ICD-10-CM

## 2022-05-11 DIAGNOSIS — F41.9 ANXIETY: ICD-10-CM

## 2022-05-11 DIAGNOSIS — F43.21 SITUATIONAL DEPRESSION: ICD-10-CM

## 2022-05-11 DIAGNOSIS — G20.A1 PARKINSON DISEASE: ICD-10-CM

## 2022-05-11 PROCEDURE — 99350 PR HOME VISIT,ESTAB PATIENT,LEVEL IV: ICD-10-PCS | Mod: 25,S$GLB,, | Performed by: NURSE PRACTITIONER

## 2022-05-11 PROCEDURE — 96127 BRIEF EMOTIONAL/BEHAV ASSMT: CPT | Mod: S$GLB,,, | Performed by: NURSE PRACTITIONER

## 2022-05-11 PROCEDURE — 99350 HOME/RES VST EST HIGH MDM 60: CPT | Mod: 25,S$GLB,, | Performed by: NURSE PRACTITIONER

## 2022-05-11 PROCEDURE — 96127 PR BRIEF EMOTIONAL/BEHAV ASSMT: ICD-10-PCS | Mod: S$GLB,,, | Performed by: NURSE PRACTITIONER

## 2022-05-12 ENCOUNTER — PATIENT MESSAGE (OUTPATIENT)
Dept: FAMILY MEDICINE | Facility: CLINIC | Age: 82
End: 2022-05-12
Payer: MEDICARE

## 2022-05-12 VITALS
SYSTOLIC BLOOD PRESSURE: 156 MMHG | HEART RATE: 63 BPM | OXYGEN SATURATION: 96 % | DIASTOLIC BLOOD PRESSURE: 82 MMHG | RESPIRATION RATE: 16 BRPM | TEMPERATURE: 97 F

## 2022-05-12 PROBLEM — H61.22 HEARING LOSS OF LEFT EAR DUE TO CERUMEN IMPACTION: Status: ACTIVE | Noted: 2022-05-12

## 2022-05-12 NOTE — PATIENT INSTRUCTIONS
Instructions for the patient:  - Continue all medications, treatments and therapies as ordered.   - Follow all instructions, recommendations as discussed.  - Maintain Safety Precautions at all times.  - Attend all medical appointments as scheduled.  - For worsening symptoms: call Primary Care Physician or Nurse Practitioner.  - For emergencies, call 911 or immediately report to the nearest emergency room.  - Limit Risks of environmental exposure to coronavirus/COVID-19 as discussed including: social distancing, hand hygiene, and limiting departures from the home for necessities only.

## 2022-05-12 NOTE — PROGRESS NOTES
"[facility]  Palliative Care Home Visit    Visit Date: 5/11/2022  Encounter Provider: Rosa Collins NP  PCP:  Prabhu Clark MD    Subjective:      Patient ID: Radha Alas is a 81 y.o. female.    Consult Requested By: Shahnaz Iqbal NP  Reason for Consult:  Palliative Care    Radha is being seen at home due to physical debility that presents a taxing effort to leave the home, to mitigate high risk of hospital readmission and/or due to the limited availability of reliable or safe options for transportation to the point of access to the provider. Prior to treatment on this visit the chart was reviewed and patient verbal consent was obtained.      Chief Complaint: Follow-up, Parkinson's    Radha is an 81 year old female with parkinson disease (2014), T2DM, a-fib, htn, hld, hx of dvt, chronic constipation, GERD, osteoarthritis and prolapse bladder.      Radha is being seen today to for a follow up Palliative Care visit. With this visit today Radha is found at home with spouse Isak present. Rdaha is found sitting in her recliner chair AAO x 3, pleasant, flat affect, slow to responsd and with moderate tremors noted in both arms/hands. She states that she has been doing "ok" and has no complaints today. Isak reports Radha's weakness is worsening and she has a strong desire to want to walk more. Both he and patient are requesting  PT/OT services to build strength. Patient is dependent on spouse for all ADLs and gets sad and frustrated at the loss of her independence due to Parkinson's. Spouse reports recent resumption of carbidopa causes confusion and hallucinations. On low dose. To follow up with Neurology. Anxiety stable with buspar.    Appetite is reported to be "good" and no swallowing issues reported. Weight appears to be stable, cannot get patient onto a scale safely.      Bowel and bladder incontinence most of the time however if she is near the toilet she can use it.        VSS. Denies fever, chest pain, " shortness of breath, nausea, vomiting, diarrhea. Denies any acute issues, concerns or complaints to address on today's visit. Reports taking all medications as prescribed. No other needs identified at this time. Risks of environmental exposure to coronavirus discussed including: social distancing, hand hygiene, and limiting departures from the home for necessities only.  Reports understanding and willingness to comply.          Goals of care discussed today. Radha wishes to be a full code and to be cared for at home by spouse. Isak agrees that the best choice is for Radha to be cared for at home. Advanced directives were discussed on last visit and forms were left for completion at that time. Isak has not completed yet.     Review of Systems   Constitutional: Negative for activity change, appetite change, fatigue and unexpected weight change.   HENT: Negative for congestion, facial swelling, sinus pressure, sinus pain and sneezing.    Eyes: Negative for visual disturbance.   Respiratory: Negative for cough, chest tightness, shortness of breath and wheezing.    Cardiovascular: Positive for leg swelling. Negative for chest pain and palpitations.   Gastrointestinal: Negative for abdominal pain, blood in stool, constipation, diarrhea, nausea and vomiting.        BM nearly daily.    Genitourinary: Positive for enuresis. Negative for dysuria, flank pain, frequency, hematuria and pelvic pain. Bowel and bladder incontinence.  Musculoskeletal: Positive for arthralgias, back pain and gait problem. Negative for joint swelling, myalgias, neck pain and neck stiffness.   Skin: Negative for rash, wound.  Neurological: Positive for tremors and weakness. Negative for dizziness, light-headedness and headaches.   Hematological: Negative for adenopathy. Does not bruise/bleed easily.   Psychiatric/Behavioral: Negative for dysphoric mood and sleep disturbance. Positive for anxiety at times.     Assessments:  · Environmental: lives with  "spouse Isak in a single story home that is clean with adequate lighting and temperature  · Functional Status: can feed self finger foods but requires assistance with all other ADLs due to tremors from Parkinson's.  · Safety: fall risk, recently slid out of chair  · Nutritional: has adequate access, reports "good" appetite  · Home Health/DME/Supplies: No HH.DME: wheelchair, rollator, glucometer              History:  Past Medical History:   Diagnosis Date    Anemia     Atrial fibrillation     Chronic constipation     Deep vein thrombosis of left lower extremity     Diabetes mellitus     TYPE 2    Diverticulosis     Hypertension     Obesity     Parkinson disease     Peptic ulcer of stomach     Prolapse of female bladder, acquired     Rectocele     Uterine cancer 11-1-2011    STAGE 1-A GRADE 2     Family History   Problem Relation Age of Onset    Heart disease Mother     Diabetes Mother     Hypertension Father     Heart disease Sister     Diabetes Maternal Grandmother     Hypertension Maternal Grandmother     No Known Problems Brother     No Known Problems Daughter     No Known Problems Son     No Known Problems Maternal Aunt     No Known Problems Maternal Uncle     No Known Problems Paternal Aunt     No Known Problems Paternal Uncle     No Known Problems Maternal Grandfather     No Known Problems Paternal Grandmother     No Known Problems Paternal Grandfather     Breast cancer Neg Hx     Ovarian cancer Neg Hx     Uterine cancer Neg Hx     Colon cancer Neg Hx     Cancer Neg Hx     Cervical cancer Neg Hx     Endometrial cancer Neg Hx     Vaginal cancer Neg Hx     Colon polyps Neg Hx     Crohn's disease Neg Hx     Ulcerative colitis Neg Hx      Past Surgical History:   Procedure Laterality Date    COLONOSCOPY  prior to 2000    COLONOSCOPY N/A 9/28/2016    Procedure: COLONOSCOPY;  Surgeon: Baljinder Ospina MD;  Location: [facility];  Service: Endoscopy;  Laterality: N/A;    " "FLEXIBLE SIGMOIDOSCOPY  06/05/2016    at [facility]- in media section: poor prep, diverticulosis noted with old clots, internal hemorrhoids otherwise normal findings- recommend outpatient colonoscopy    FOREARM SURGERY      right forearm surgery    HERNIA REPAIR      ROBOTIC ASST    HYSTERECTOMY  11/1/2001    [facility]--cancer    PELVIC AND PARA-AORTIC LYMPH NODE DISSECTION      UT REMOVAL OF OVARY/TUBE(S)      ROBOTIC ASST    TUBAL LIGATION      UPPER GASTROINTESTINAL ENDOSCOPY       Review of patient's allergies indicates:   Allergen Reactions    Glucophage  [metformin]      Other reaction(s): Diarrhea  Other reaction(s): Diarrhea    Sulfa (sulfonamide antibiotics) Itching     Other reaction(s): Itching  Other reaction(s): Itching       Medications:    Current Outpatient Medications:     ACETAMINOPHEN (TYLENOL ARTHRITIS ORAL), Take by mouth., Disp: , Rfl:     busPIRone (BUSPAR) 7.5 MG tablet, Take 1 tablet (7.5 mg total) by mouth 3 (three) times daily., Disp: 90 tablet, Rfl: 11    carbidopa-levodopa  mg (SINEMET CR)  mg TbSR, Take 1 tablet by mouth 2 (two) times daily., Disp: 60 tablet, Rfl: 11    diltiaZEM (CARTIA XT) 120 MG Cp24, Take 1 capsule (120 mg total) by mouth every evening., Disp: 90 capsule, Rfl: 3    DROPLET PEN NEEDLE 31 gauge x 3/16" Ndle, USE  TO  ADMINISTER  INSULIN ONE TIME DAILY, Disp: 100 each, Rfl: 11    ELIQUIS 5 mg Tab, TK 1 T PO BID, Disp: 180 tablet, Rfl: 3    glimepiride (AMARYL) 4 MG tablet, Take 1 tablet (4 mg total) by mouth before breakfast., Disp: 90 tablet, Rfl: 3    insulin detemir U-100 (LEVEMIR FLEXTOUCH U-100 INSULN) 100 unit/mL (3 mL) InPn pen, Inject 20 Units into the skin every evening., Disp: 5 each, Rfl: 3    lisinopriL (PRINIVIL,ZESTRIL) 20 MG tablet, 1 TABLET ONCE DAILY, Disp: 90 tablet, Rfl: 3    omeprazole (PRILOSEC) 40 MG capsule, Take 1 capsule (40 mg total) by mouth once daily., Disp: 90 capsule, Rfl: 3    polyethylene glycol (GLYCOLAX) 17 " gram/dose powder, TK 17 GRAMS PO QD UTD, Disp: , Rfl:     selegiline (ELDEPRYL) 5 mg Cap, Take 1 capsule (5 mg total) by mouth 2 (two) times daily., Disp: 60 capsule, Rfl: 11    TRUE METRIX GLUCOSE TEST STRIP Strp, Test glucose levels 2-3 daily for diabetes, Disp: 200 strip, Rfl: 3    24h Oral Morphine Equivalents (OME):  N/A    Objective:     Physical Exam:  Vitals:    05/11/22 1420   BP: (!) 156/82   Pulse: 63   Resp: 16   Temp: 97.3 °F (36.3 °C)   TempSrc: Temporal   SpO2: 96%   PainSc: 0-No pain     There is no height or weight on file to calculate BMI.    Physical Exam  Constitutional:       General: She is not in acute distress.     Appearance: She is well-developed. She is obese. She is not diaphoretic.   HENT:      Head: Normocephalic and atraumatic.      Right Ear: External ear normal.      Left Ear: cerumen impaction.   Eyes:      Pupils: Pupils are equal, round, and reactive to light.   Neck:      Musculoskeletal: Neck supple. No muscular tenderness.   Cardiovascular:      Rate and Rhythm: Normal rate. Rhythm irregular.      Heart sounds: No friction rub. No gallop.    Pulmonary:      Effort: Pulmonary effort is normal. No respiratory distress.      Breath sounds: Normal breath sounds. No wheezing.   Chest:      Chest wall: No tenderness.   Abdominal:      General: Bowel sounds are normal. There is no distension.      Palpations: Abdomen is soft.      Tenderness: There is no abdominal tenderness.   Musculoskeletal: Normal range of motion.         General: Swelling and deformity present. No tenderness.      Comments: Deformity noted to bilateral hands, L hand contracture worse   Trace pitting pedal edema to BLE   Skin:     General: Skin is warm and dry.      Capillary Refill: Capillary refill takes less than 2 seconds.      Findings:  No bruising. No lesion present.    Neurological:      Mental Status: She is alert and oriented to person, place, and time.      Sensory: No sensory deficit.      Motor:  Weakness present.      Gait: Gait abnormal.      Comments: Decreased strength RUE 4/5, LUE 3/5  BLE 3/5   Psychiatric:         Behavior: Behavior normal. Flat affect.        Thought Content: Thought content normal.         Judgment: Judgment normal.            Review of Symptoms     Symptom Assessment (ESAS 0-10 Scale)  Pain:  0  Dyspnea:  0  Anxiety:  2  Nausea:  0  Depression:  0  Anorexia:  0  Fatigue:  5  Insomnia:  0  Restlessness:  1  Agitation:  1      CAM / Delirium:  Negative  Constipation:  Positive  Diarrhea:  Negative     Bowel Management Plan (BMP):  No       Comments:  Normally has BM qd- qod, incontinence reported due to weakness and inability to get to toilet in time frequently        Performance Status:  50     ECOG Performance Status Grade:  3 - Confined to bed or chair 50% of waking hours     Living Arrangements:  Lives with spouse     Psychosocial/Cultural: Retired teacher     Spiritual:  F - Grace and Belief:  [employer]ism  I - Importance:  Very important  C - Community:  Does not attend [employer] since Covid  A - Address in Care:  No issues identified/reported     Time-Based Charting:  Yes  Chart Review: 10 minutes  Face to Face: 20 minutes  Symptom Assessment: 10 minutes  Coordination of Care: 5 minutes  Advance Care Plannin minutes  Goals of Care: 10 minutes    Total Time Spent: 60 minutes        Advance Care Planning   Advance Directives:   Living Will: No    LaPOST: No    Do Not Resuscitate Status: No    Medical Power of : Yes (pt verbalizes her spouse Isak has HPOA)       Decision Making:  Patient answered questions and Family answered questions            Labs:  CBC:   WBC   Date Value Ref Range Status   2022 8.54 3.90 - 12.70 K/uL Final       Hemoglobin   Date Value Ref Range Status   2022 13.4 12.0 - 16.0 g/dL Final         Hematocrit   Date Value Ref Range Status   2022 42.1 37.0 - 48.5 % Final       MCV   Date Value Ref Range Status   2022 93 82 - 98 fL  Final         Platelets   Date Value Ref Range Status   04/29/2022 327 150 - 450 K/uL Final       LFT:   Lab Results   Component Value Date    AST 16 04/29/2022    ALKPHOS 81 04/29/2022    BILITOT 0.5 04/29/2022       Albumin:   Albumin   Date Value Ref Range Status   04/29/2022 3.3 (L) 3.5 - 5.2 g/dL Final     Protein:   Total Protein   Date Value Ref Range Status   04/29/2022 7.2 6.0 - 8.4 g/dL Final       Radiology:  I have reviewed all pertinent imaging results/findings within the past 24 hours.      Assessment:     1. Weakness    2. Parkinson disease    3. Anxiety    4. Situational depression    5. Hearing loss of left ear due to cerumen impaction    6. Impaired mobility and activities of daily living        Plan:   Radha was seen today for follow-up and tremors.    Diagnoses and all orders for this visit:    Weakness  -     Ambulatory referral/consult to Home Health; Future    Parkinson disease  -     Ambulatory referral/consult to [facility]  -     Ambulatory referral/consult to Home Health; Future    Anxiety    Situational depression    Hearing loss of left ear due to cerumen impaction    Impaired mobility and activities of daily living  -     Ambulatory referral/consult to Home Health; Future      Problem List Items Addressed This Visit        Neuro    Parkinson disease     Chronic and stable.  diagnosed in 2014, followed by Neurology  severe bradykinesia and resting tremors  Sensitive to carbidopa with increased confusion/hallucinations  Increased weakness and barely ambulatory-asking for PT/OT           Relevant Orders    Ambulatory referral/consult to Home Health       Psychiatric    Situational depression     Related to loss of independence.  PHQ9 score 6.  Denies SI.  Continue to monitor.   Does not want antidepressants.           Anxiety     Chronic.  Stable on buspar.  Anxiety related to Parkinson's.              ENT    Hearing loss of left ear due to cerumen impaction      Spouse to obtain OTC ear wax removal.              Other    Weakness - Primary     Chronic and related to Parkinson's.  Will order HH PT.  Fall precautions and safety advised.           Relevant Orders    Ambulatory referral/consult to Home Health    Impaired mobility and activities of daily living     Chronic and related to Parkinsons and OA.  Fall precautions and safety advised.  Spouse provides assistance with all ADLs.  HH PT/OT ordered.           Relevant Orders    Ambulatory referral/consult to Home Health            Were controlled substances prescribed?  No    > 50% of 60 min visit spent in chart review, face to face discussion of goals of care,  symptom assessment, coordination of care and emotional support.    8 minutes of visit spent discussing depression and performing PHQ9. Score 6.    Follow Up Appointments:   Future Appointments   Date Time Provider Department Center   9/19/2022  1:15 PM [facility]       Signature:  Rosa Collins NP

## 2022-05-16 ENCOUNTER — OFFICE VISIT (OUTPATIENT)
Dept: NEUROLOGY | Facility: CLINIC | Age: 82
End: 2022-05-16
Payer: MEDICARE

## 2022-05-16 DIAGNOSIS — G20.A1 PARKINSON DISEASE: ICD-10-CM

## 2022-05-16 PROCEDURE — 99204 OFFICE O/P NEW MOD 45 MIN: CPT | Mod: 95,,, | Performed by: PSYCHIATRY & NEUROLOGY

## 2022-05-16 PROCEDURE — 99204 PR OFFICE/OUTPT VISIT, NEW, LEVL IV, 45-59 MIN: ICD-10-PCS | Mod: 95,,, | Performed by: PSYCHIATRY & NEUROLOGY

## 2022-05-16 NOTE — ASSESSMENT & PLAN NOTE
B/L profound PDism since 4-8 years.  She is unfortunately acutely sensitive to Ldopa - confusions and hallucinations with small doses 25/100 CR.  Suggested try 1/2 dose. Ask cardiologist if donepezil would be tolerated.  If so, try donepezil 5mg QHS and then try carbidopa/levodopa 25/100mg 1 tab PO TID

## 2022-05-28 ENCOUNTER — PATIENT MESSAGE (OUTPATIENT)
Dept: NEUROLOGY | Facility: CLINIC | Age: 82
End: 2022-05-28
Payer: MEDICARE

## 2022-05-28 DIAGNOSIS — G20.A1 PARKINSON DISEASE: Primary | ICD-10-CM

## 2022-06-06 RX ORDER — DONEPEZIL HYDROCHLORIDE 5 MG/1
5 TABLET, FILM COATED ORAL NIGHTLY
Qty: 30 TABLET | Refills: 5 | Status: SHIPPED | OUTPATIENT
Start: 2022-06-06 | End: 2022-10-04

## 2022-06-06 NOTE — ASSESSMENT & PLAN NOTE
Chronic and stable.  diagnosed in 2014, followed by Neurology  severe bradykinesia and resting tremors  Sensitive to carbidopa with increased confusion/hallucinations  Increased weakness and barely ambulatory-asking for PT/OT

## 2022-06-06 NOTE — ASSESSMENT & PLAN NOTE
Chronic and related to Parkinsons and OA.  Fall precautions and safety advised.  Spouse provides assistance with all ADLs.  HH PT/OT ordered.

## 2022-06-06 NOTE — ASSESSMENT & PLAN NOTE
Related to loss of independence.  PHQ9 score 6.  Denies SI.  Continue to monitor.   Does not want antidepressants.

## 2022-06-10 ENCOUNTER — PATIENT MESSAGE (OUTPATIENT)
Dept: NEUROLOGY | Facility: CLINIC | Age: 82
End: 2022-06-10
Payer: MEDICARE

## 2022-06-22 RX ORDER — BUSPIRONE HYDROCHLORIDE 7.5 MG/1
7.5 TABLET ORAL 3 TIMES DAILY
Qty: 90 TABLET | Refills: 11 | Status: SHIPPED | OUTPATIENT
Start: 2022-06-22 | End: 2023-09-13 | Stop reason: SDUPTHER

## 2022-07-05 DIAGNOSIS — Z79.4 CONTROLLED TYPE 2 DIABETES MELLITUS WITH BOTH EYES AFFECTED BY MODERATE NONPROLIFERATIVE RETINOPATHY WITHOUT MACULAR EDEMA, WITH LONG-TERM CURRENT USE OF INSULIN: ICD-10-CM

## 2022-07-05 DIAGNOSIS — E11.3393 CONTROLLED TYPE 2 DIABETES MELLITUS WITH BOTH EYES AFFECTED BY MODERATE NONPROLIFERATIVE RETINOPATHY WITHOUT MACULAR EDEMA, WITH LONG-TERM CURRENT USE OF INSULIN: ICD-10-CM

## 2022-07-05 DIAGNOSIS — I15.2 HYPERTENSION ASSOCIATED WITH DIABETES: ICD-10-CM

## 2022-07-05 DIAGNOSIS — E11.59 HYPERTENSION ASSOCIATED WITH DIABETES: ICD-10-CM

## 2022-07-05 RX ORDER — LISINOPRIL 20 MG/1
TABLET ORAL
Qty: 90 TABLET | Refills: 3 | Status: SHIPPED | OUTPATIENT
Start: 2022-07-05 | End: 2023-07-20

## 2022-07-05 NOTE — TELEPHONE ENCOUNTER
No new care gaps identified.  [facility] Embedded Care Gaps. Reference number: [id number]. 7/05/2022   11:31:07 AM TAOT

## 2022-07-06 ENCOUNTER — EXTERNAL HOME HEALTH (OUTPATIENT)
Dept: HOME HEALTH SERVICES | Facility: HOSPITAL | Age: 82
End: 2022-07-06
Payer: MEDICARE

## 2022-07-07 ENCOUNTER — CARE AT HOME (OUTPATIENT)
Dept: HOME HEALTH SERVICES | Facility: CLINIC | Age: 82
End: 2022-07-07
Payer: MEDICARE

## 2022-07-07 VITALS
OXYGEN SATURATION: 97 % | TEMPERATURE: 98 F | RESPIRATION RATE: 16 BRPM | HEART RATE: 63 BPM | SYSTOLIC BLOOD PRESSURE: 142 MMHG | DIASTOLIC BLOOD PRESSURE: 76 MMHG

## 2022-07-07 DIAGNOSIS — I15.2 HYPERTENSION ASSOCIATED WITH DIABETES: ICD-10-CM

## 2022-07-07 DIAGNOSIS — Z74.09 IMPAIRED MOBILITY AND ACTIVITIES OF DAILY LIVING: ICD-10-CM

## 2022-07-07 DIAGNOSIS — Z51.5 PALLIATIVE CARE ENCOUNTER: ICD-10-CM

## 2022-07-07 DIAGNOSIS — G20.A1 PARKINSON DISEASE: Primary | ICD-10-CM

## 2022-07-07 DIAGNOSIS — Z99.3 WHEELCHAIR CONFINEMENT: ICD-10-CM

## 2022-07-07 DIAGNOSIS — E11.40 TYPE 2 DIABETES MELLITUS WITH DIABETIC NEUROPATHY, WITH LONG-TERM CURRENT USE OF INSULIN: ICD-10-CM

## 2022-07-07 DIAGNOSIS — E11.59 HYPERTENSION ASSOCIATED WITH DIABETES: ICD-10-CM

## 2022-07-07 DIAGNOSIS — I48.20 CHRONIC ATRIAL FIBRILLATION: ICD-10-CM

## 2022-07-07 DIAGNOSIS — Z78.9 IMPAIRED MOBILITY AND ACTIVITIES OF DAILY LIVING: ICD-10-CM

## 2022-07-07 DIAGNOSIS — Z79.4 TYPE 2 DIABETES MELLITUS WITH DIABETIC NEUROPATHY, WITH LONG-TERM CURRENT USE OF INSULIN: ICD-10-CM

## 2022-07-07 PROCEDURE — 3051F HG A1C>EQUAL 7.0%<8.0%: CPT | Mod: CPTII,S$GLB,, | Performed by: NURSE PRACTITIONER

## 2022-07-07 PROCEDURE — 99350 PR HOME VISIT,ESTAB PATIENT,LEVEL IV: ICD-10-PCS | Mod: S$GLB,,, | Performed by: NURSE PRACTITIONER

## 2022-07-07 PROCEDURE — 3051F PR MOST RECENT HEMOGLOBIN A1C LEVEL 7.0 - < 8.0%: ICD-10-PCS | Mod: CPTII,S$GLB,, | Performed by: NURSE PRACTITIONER

## 2022-07-07 PROCEDURE — 99350 HOME/RES VST EST HIGH MDM 60: CPT | Mod: S$GLB,,, | Performed by: NURSE PRACTITIONER

## 2022-07-08 NOTE — PROGRESS NOTES
"[facility]  Palliative Care Home Visit    Visit Date: 7/7/2022  Encounter Provider: Rosa Collins NP  PCP:  Prabhu Clark MD    Subjective:      Patient ID: Radha Alas is a 81 y.o. female.    Consult Requested By: Shahnaz Iqbal NP(inactive)  Reason for Consult:  Palliative Care    Radha is being seen at home due to physical debility that presents a taxing effort to leave the home, to mitigate high risk of hospital readmission and/or due to the limited availability of reliable or safe options for transportation to the point of access to the provider. Prior to treatment on this visit the chart was reviewed and patient verbal consent was obtained.      Chief Complaint: Follow-up, Parkinson's    Radha is an 81 year old female with parkinson disease (2014), T2DM, a-fib, htn, hld, hx of dvt, chronic constipation, GERD, osteoarthritis and prolapse bladder.      Radha is being seen today to for a follow up Palliative Care visit. With this visit today Radha is found at home with spouse Isak present. As usual, Radha is found sitting in her recliner chair AAO x 3, pleasant, flat affect, slow to responsd and with moderate tremors noted in both arms/hands and now in the left lower leg noted today (this was not present on last visit). Isak reports patient had tried half dose of carbidopa and donepezil for about a week and starting developing increased confusion, insomnia, blisters between her thighs and some hallucinations. Carbidopa and donepezil stopped and symptoms resolved. Currently only on segiline.     She states that she has been doing "ok" and has no complaints today. Isak reports Radha's weakness is worsening and has just been discharged from Home Health PT. Isak is requesting a maegan lift to assist with moving patient as she is now chairbound, not walking.    Appetite is reported to be "good" and no swallowing issues reported. Weight appears to be stable, cannot get patient onto a scale safely.     Isak checks " "patient's BG daily today was 120. Ranges of 100-160 reported over last few weeks. Gets SS levemir "a couple time a week" depending upon what she ate.        VSS. Denies fever, chest pain, shortness of breath, nausea, vomiting, diarrhea. Denies any acute issues, concerns or complaints to address on today's visit. Reports taking all medications as prescribed. No other needs identified at this time. Risks of environmental exposure to coronavirus discussed including: social distancing, hand hygiene, and limiting departures from the home for necessities only. Reports understanding and willingness to comply.          Goals of care discussed today. Radha wishes to be a full code and to be cared for at home by spouse. Isak agrees that the best choice is for Radha to be cared for at home. Advanced directives were discussed on last visit and forms were left for completion at that time. Isak has not completed yet.     Review of Systems   Constitutional: Negative for activity change, appetite change, fatigue and unexpected weight change.   HENT: Negative for congestion, facial swelling, sinus pressure, sinus pain and sneezing.    Eyes: Negative for visual disturbance.   Respiratory: Negative for cough, chest tightness, shortness of breath and wheezing.    Cardiovascular: Negative for leg swelling, chest pain and palpitations.   Gastrointestinal: Negative for abdominal pain, blood in stool, constipation, diarrhea, nausea and vomiting.        BM nearly daily.    Genitourinary: Positive for enuresis. Negative for dysuria, flank pain, frequency, hematuria and pelvic pain. Bowel and bladder incontinence.  Musculoskeletal: Positive for arthralgias, back pain and gait problem. Negative for joint swelling, myalgias, neck pain and neck stiffness.   Skin: Negative for rash, wound.  Neurological: Positive for tremors and weakness. Negative for dizziness, light-headedness and headaches.   Hematological: Negative for adenopathy. Does not " "bruise/bleed easily.   Psychiatric/Behavioral: Negative for dysphoric mood and sleep disturbance. Positive for anxiety at times.     Assessments:  · Environmental: lives with spouse Isak in a single story home that is clean with adequate lighting and temperature  · Functional Status: can feed self finger foods but requires assistance with all other ADLs due to tremors from Parkinson's.  · Safety: fall risk, recently slid out of chair  · Nutritional: has adequate access, reports "good" appetite  · Home Health/DME/Supplies: No HH.DME: wheelchair, rollator, glucometer        History:  Past Medical History:   Diagnosis Date    Anemia     Atrial fibrillation     Chronic constipation     Deep vein thrombosis of left lower extremity     Diabetes mellitus     TYPE 2    Diverticulosis     Hypertension     Obesity     Parkinson disease     Peptic ulcer of stomach     Prolapse of female bladder, acquired     Rectocele     Uterine cancer 11-1-2011    STAGE 1-A GRADE 2     Family History   Problem Relation Age of Onset    Heart disease Mother     Diabetes Mother     Hypertension Father     Heart disease Sister     Diabetes Maternal Grandmother     Hypertension Maternal Grandmother     No Known Problems Brother     No Known Problems Daughter     No Known Problems Son     No Known Problems Maternal Aunt     No Known Problems Maternal Uncle     No Known Problems Paternal Aunt     No Known Problems Paternal Uncle     No Known Problems Maternal Grandfather     No Known Problems Paternal Grandmother     No Known Problems Paternal Grandfather     Breast cancer Neg Hx     Ovarian cancer Neg Hx     Uterine cancer Neg Hx     Colon cancer Neg Hx     Cancer Neg Hx     Cervical cancer Neg Hx     Endometrial cancer Neg Hx     Vaginal cancer Neg Hx     Colon polyps Neg Hx     Crohn's disease Neg Hx     Ulcerative colitis Neg Hx      Past Surgical History:   Procedure Laterality Date    COLONOSCOPY  " "prior to 2000    COLONOSCOPY N/A 9/28/2016    Procedure: COLONOSCOPY;  Surgeon: Baljinder Ospina MD;  Location[facility];  Service: Endoscopy;  Laterality: N/A;    FLEXIBLE SIGMOIDOSCOPY  06/05/2016    at [facility]- in media section: poor prep, diverticulosis noted with old clots, internal hemorrhoids otherwise normal findings- recommend outpatient colonoscopy    FOREARM SURGERY      right forearm surgery    HERNIA REPAIR      ROBOTIC ASST    HYSTERECTOMY  11/1/2001    [facility]--cancer    PELVIC AND PARA-AORTIC LYMPH NODE DISSECTION      AK REMOVAL OF OVARY/TUBE(S)      ROBOTIC ASST    TUBAL LIGATION      UPPER GASTROINTESTINAL ENDOSCOPY       Review of patient's allergies indicates:   Allergen Reactions    Glucophage  [metformin]      Other reaction(s): Diarrhea  Other reaction(s): Diarrhea    Sulfa (sulfonamide antibiotics) Itching     Other reaction(s): Itching  Other reaction(s): Itching       Medications:    Current Outpatient Medications:     ACETAMINOPHEN (TYLENOL ARTHRITIS ORAL), Take by mouth., Disp: , Rfl:     busPIRone (BUSPAR) 7.5 MG tablet, Take 1 tablet (7.5 mg total) by mouth 3 (three) times daily., Disp: 90 tablet, Rfl: 11    diltiaZEM (CARTIA XT) 120 MG Cp24, Take 1 capsule (120 mg total) by mouth every evening., Disp: 90 capsule, Rfl: 3    DROPLET PEN NEEDLE 31 gauge x 3/16" Ndle, USE  TO  ADMINISTER  INSULIN ONE TIME DAILY, Disp: 100 each, Rfl: 11    ELIQUIS 5 mg Tab, TK 1 T PO BID, Disp: 180 tablet, Rfl: 3    glimepiride (AMARYL) 4 MG tablet, Take 1 tablet (4 mg total) by mouth before breakfast., Disp: 90 tablet, Rfl: 3    insulin detemir U-100 (LEVEMIR FLEXTOUCH U-100 INSULN) 100 unit/mL (3 mL) InPn pen, Inject 20 Units into the skin every evening., Disp: 5 each, Rfl: 3    omeprazole (PRILOSEC) 40 MG capsule, Take 1 capsule (40 mg total) by mouth once daily., Disp: 90 capsule, Rfl: 3    polyethylene glycol (GLYCOLAX) 17 gram/dose powder, TK 17 GRAMS PO QD UTD, Disp: , Rfl:     " selegiline (ELDEPRYL) 5 mg Cap, Take 1 capsule (5 mg total) by mouth 2 (two) times daily., Disp: 60 capsule, Rfl: 11    TRUE METRIX GLUCOSE TEST STRIP Strp, Test glucose levels 2-3 daily for diabetes, Disp: 200 strip, Rfl: 3    carbidopa-levodopa  mg (SINEMET CR)  mg TbSR, Take 1 tablet by mouth 2 (two) times daily. (Patient not taking: Reported on 7/7/2022), Disp: 60 tablet, Rfl: 11    donepeziL (ARICEPT) 5 MG tablet, Take 1 tablet (5 mg total) by mouth every evening. (Patient not taking: Reported on 7/7/2022), Disp: 30 tablet, Rfl: 05    lisinopriL (PRINIVIL,ZESTRIL) 20 MG tablet, 1 TABLET ONCE DAILY, Disp: 90 tablet, Rfl: 3    24h Oral Morphine Equivalents (OME):  N/A    Objective:     Physical Exam:  Vitals:    07/07/22 1230   BP: (!) 142/76   Pulse: 63   Resp: 16   Temp: 98.3 °F (36.8 °C)   TempSrc: Temporal   SpO2: 97%   PainSc: 0-No pain     There is no height or weight on file to calculate BMI.    Physical Exam  Constitutional:       General: She is not in acute distress.     Appearance: She is well-developed. She is obese. She is not diaphoretic.   HENT:      Head: Normocephalic and atraumatic.      Right Ear: External ear normal.      Left Ear: cerumen impaction.   Eyes:      Pupils: Pupils are equal, round, and reactive to light.   Neck:      Musculoskeletal: Neck supple. No muscular tenderness.   Cardiovascular:      Rate and Rhythm: Normal rate. Rhythm irregular.      Heart sounds: No friction rub. No gallop.    Pulmonary:      Effort: Pulmonary effort is normal. No respiratory distress.      Breath sounds: Normal breath sounds. No wheezing.   Chest:      Chest wall: No tenderness.   Abdominal:      General: Bowel sounds are normal. There is no distension.      Palpations: Abdomen is soft.      Tenderness: There is no abdominal tenderness.   Musculoskeletal: Normal range of motion.         General: No tenderness. No swelling noted today.     Comments: Deformity noted to bilateral hands,  L hand contracture worse   Skin:     General: Skin is warm and dry. Intact.     Capillary Refill: Capillary refill takes less than 2 seconds.      Findings:  No bruising. No lesion present.    Neurological:      Mental Status: She is alert and oriented to person, place, and time.      Sensory: No sensory deficit.      Motor: Weakness present.      Gait: Gait abnormal.      Comments: Decreased strength RUE 4/5, LUE 3/5  BLE 3/5   Psychiatric:         Behavior: Behavior normal. Flat affect.        Thought Content: Thought content normal.         Judgment: Judgment normal.            Review of Symptoms     Symptom Assessment (ESAS 0-10 Scale)  Pain:  0  Dyspnea:  0  Anxiety:  1  Nausea:  0  Depression:  0  Anorexia:  0  Fatigue:  3  Insomnia:  0  Restlessness:  0  Agitation:  0      CAM / Delirium:  Negative  Constipation:  Positive  Diarrhea:  Negative     Bowel Management Plan (BMP):  No       Comments:  Normally has BM qd- qod, incontinence reported due to weakness and inability to get to toilet in time frequently        Performance Status:  50     ECOG Performance Status Grade:  3 - Confined to bed or chair 100% of waking hours     Living Arrangements:  Lives with spouse     Psychosocial/Cultural: Retired teacher     Spiritual:  F - Grace and Belief:  Confucianism  I - Importance:  Very important  C - Community:  Does not attend [employer] since [facility]  A - Address in Care:  No issues identified/reported     Time-Based Charting:  Yes  Chart Review: 10 minutes  Face to Face: 20 minutes  Symptom Assessment: 10 minutes  Coordination of Care: 5 minutes  Advance Care Plannin minutes  Goals of Care: 10 minutes    Total Time Spent: 60 minutes        Advance Care Planning   Advance Directives:   Living Will: No    [place]: No    Do Not Resuscitate Status: No    Medical Power of : Yes (pt verbalizes her spouse Isak has HPOA)       Decision Making:  Patient answered questions and Family answered questions             Labs:  CBC:   WBC   Date Value Ref Range Status   04/29/2022 8.54 3.90 - 12.70 K/uL Final       Hemoglobin   Date Value Ref Range Status   04/29/2022 13.4 12.0 - 16.0 g/dL Final         Hematocrit   Date Value Ref Range Status   04/29/2022 42.1 37.0 - 48.5 % Final       MCV   Date Value Ref Range Status   04/29/2022 93 82 - 98 fL Final         Platelets   Date Value Ref Range Status   04/29/2022 327 150 - 450 K/uL Final       LFT:   Lab Results   Component Value Date    AST 16 04/29/2022    ALKPHOS 81 04/29/2022    BILITOT 0.5 04/29/2022       Albumin:   Albumin   Date Value Ref Range Status   04/29/2022 3.3 (L) 3.5 - 5.2 g/dL Final     Protein:   Total Protein   Date Value Ref Range Status   04/29/2022 7.2 6.0 - 8.4 g/dL Final       Radiology:  I have reviewed all pertinent imaging results/findings within the past 24 hours.      Assessment:     1. Parkinson disease    2. Wheelchair confinement    3. Impaired mobility and activities of daily living    4. Hypertension associated with diabetes    5. Chronic atrial fibrillation    6. Type 2 diabetes mellitus with diabetic neuropathy, with long-term current use of insulin    7. Palliative care encounter        Plan:   Radha was seen today for follow-up.    Diagnoses and all orders for this visit:    Parkinson disease  -     PATIENT (TINO) LIFT FOR HOME USE    Wheelchair confinement  -     PATIENT (TINO) LIFT FOR HOME USE    Impaired mobility and activities of daily living  -     PATIENT (TINO) LIFT FOR HOME USE    Hypertension associated with diabetes    Chronic atrial fibrillation    Type 2 diabetes mellitus with diabetic neuropathy, with long-term current use of insulin    Palliative care encounter      Problem List Items Addressed This Visit        Neuro    Parkinson disease - Primary     Chronic and stable.  diagnosed in 2014, followed by Neurology  severe bradykinesia and resting tremors  Unable to tolerate donepezil and carbidopa with increased  confusion/hallucinations  Tolerating selegiline.  Increased weakness and wheelchair bound.  Spouse requesting tino lift for assistance.            Relevant Orders    PATIENT (TINO) LIFT FOR HOME USE       Cardiac/Vascular    Hypertension associated with diabetes     BP controlled on current regimen.  No edema.  BG stable on current regimen as well.               Chronic atrial fibrillation     Chronic and stable.  On eliquis and diltiazem.               Endocrine    Type 2 diabetes mellitus with diabetic neuropathy, with long-term current use of insulin     Chronic and stable on glimiperide, amaryl and levemir.                Palliative Care    Palliative care encounter     Currently full code. No documents in Epic. Discussed and reviewed Advanced Directives with spouse and patient on last visit.  HPOA, Living Will, [place] forms left for completion on last visit, spouse has not completed yet.               Other    Wheelchair confinement     Spouse requesting tino lift for assistance.  Patient just d/c from  PT-goals difficult to meet due to Parkinsons.           Relevant Orders    PATIENT (TINO) LIFT FOR HOME USE    Impaired mobility and activities of daily living     Chronic and related to Parkinsons and OA.  Fall precautions and safety advised.  Spouse provides assistance with all ADLs.           Relevant Orders    PATIENT (TINO) LIFT FOR HOME USE            Were controlled substances prescribed?  No      Follow Up Appointments:   Future Appointments   Date Time Provider Department Center   9/19/2022  1:15 PM [facility]       Signature:  Rosa Collins NP

## 2022-07-08 NOTE — ASSESSMENT & PLAN NOTE
Spouse requesting maegan lift for assistance.  Patient just d/c from  PT-goals difficult to meet due to Parkinsons.

## 2022-07-08 NOTE — ASSESSMENT & PLAN NOTE
Chronic and stable.  diagnosed in 2014, followed by Neurology  severe bradykinesia and resting tremors  Unable to tolerate donepezil and carbidopa with increased confusion/hallucinations  Tolerating selegiline.  Increased weakness and wheelchair bound.  Spouse requesting maegan lift for assistance.

## 2022-08-30 RX ORDER — CALCIUM CITRATE/VITAMIN D3 200MG-6.25
TABLET ORAL
Qty: 200 STRIP | Refills: 3 | Status: SHIPPED | OUTPATIENT
Start: 2022-08-30 | End: 2023-07-26

## 2022-08-30 NOTE — TELEPHONE ENCOUNTER
Care Due:                  Date            Visit Type   Department     Provider  --------------------------------------------------------------------------------                                EP -                              PRIMARY      [facility] FAMILY    Prabhu De Santiago  Last Visit: 04-      CARE ([facility])   [facility]       [facility]  Next Visit: None Scheduled  None         None Found                                                            Last  Test          Frequency    Reason                     Performed    Due Date  --------------------------------------------------------------------------------    HBA1C.......  6 months...  insulin..................  04-   10-    [facility] Embedded Care Gaps. Reference number: [id number]. 8/30/2022   9:30:38 AM CDT

## 2022-09-19 ENCOUNTER — OFFICE VISIT (OUTPATIENT)
Dept: CARDIOLOGY | Facility: CLINIC | Age: 82
End: 2022-09-19
Payer: MEDICARE

## 2022-09-19 VITALS
DIASTOLIC BLOOD PRESSURE: 80 MMHG | OXYGEN SATURATION: 97 % | TEMPERATURE: 98 F | HEART RATE: 74 BPM | BODY MASS INDEX: 32.18 KG/M2 | SYSTOLIC BLOOD PRESSURE: 124 MMHG | HEIGHT: 68 IN

## 2022-09-19 DIAGNOSIS — G20.A1 PARKINSON DISEASE: ICD-10-CM

## 2022-09-19 DIAGNOSIS — E78.5 HYPERLIPIDEMIA ASSOCIATED WITH TYPE 2 DIABETES MELLITUS: ICD-10-CM

## 2022-09-19 DIAGNOSIS — I48.20 CHRONIC ATRIAL FIBRILLATION: ICD-10-CM

## 2022-09-19 DIAGNOSIS — I15.2 HYPERTENSION ASSOCIATED WITH DIABETES: ICD-10-CM

## 2022-09-19 DIAGNOSIS — E11.59 HYPERTENSION ASSOCIATED WITH DIABETES: ICD-10-CM

## 2022-09-19 DIAGNOSIS — Z86.718 HISTORY OF DVT (DEEP VEIN THROMBOSIS): ICD-10-CM

## 2022-09-19 DIAGNOSIS — R53.1 WEAKNESS: ICD-10-CM

## 2022-09-19 DIAGNOSIS — F41.9 ANXIETY: ICD-10-CM

## 2022-09-19 DIAGNOSIS — E11.69 HYPERLIPIDEMIA ASSOCIATED WITH TYPE 2 DIABETES MELLITUS: ICD-10-CM

## 2022-09-19 PROCEDURE — 3074F PR MOST RECENT SYSTOLIC BLOOD PRESSURE < 130 MM HG: ICD-10-PCS | Mod: CPTII,S$GLB,, | Performed by: INTERNAL MEDICINE

## 2022-09-19 PROCEDURE — 1101F PR PT FALLS ASSESS DOC 0-1 FALLS W/OUT INJ PAST YR: ICD-10-PCS | Mod: CPTII,S$GLB,, | Performed by: INTERNAL MEDICINE

## 2022-09-19 PROCEDURE — 99214 OFFICE O/P EST MOD 30 MIN: CPT | Mod: S$GLB,,, | Performed by: INTERNAL MEDICINE

## 2022-09-19 PROCEDURE — 3288F FALL RISK ASSESSMENT DOCD: CPT | Mod: CPTII,S$GLB,, | Performed by: INTERNAL MEDICINE

## 2022-09-19 PROCEDURE — 1126F PR PAIN SEVERITY QUANTIFIED, NO PAIN PRESENT: ICD-10-PCS | Mod: CPTII,S$GLB,, | Performed by: INTERNAL MEDICINE

## 2022-09-19 PROCEDURE — 99214 PR OFFICE/OUTPT VISIT, EST, LEVL IV, 30-39 MIN: ICD-10-PCS | Mod: S$GLB,,, | Performed by: INTERNAL MEDICINE

## 2022-09-19 PROCEDURE — 3288F PR FALLS RISK ASSESSMENT DOCUMENTED: ICD-10-PCS | Mod: CPTII,S$GLB,, | Performed by: INTERNAL MEDICINE

## 2022-09-19 PROCEDURE — 1101F PT FALLS ASSESS-DOCD LE1/YR: CPT | Mod: CPTII,S$GLB,, | Performed by: INTERNAL MEDICINE

## 2022-09-19 PROCEDURE — 1159F MED LIST DOCD IN RCRD: CPT | Mod: CPTII,S$GLB,, | Performed by: INTERNAL MEDICINE

## 2022-09-19 PROCEDURE — 3079F DIAST BP 80-89 MM HG: CPT | Mod: CPTII,S$GLB,, | Performed by: INTERNAL MEDICINE

## 2022-09-19 PROCEDURE — 3079F PR MOST RECENT DIASTOLIC BLOOD PRESSURE 80-89 MM HG: ICD-10-PCS | Mod: CPTII,S$GLB,, | Performed by: INTERNAL MEDICINE

## 2022-09-19 PROCEDURE — 1159F PR MEDICATION LIST DOCUMENTED IN MEDICAL RECORD: ICD-10-PCS | Mod: CPTII,S$GLB,, | Performed by: INTERNAL MEDICINE

## 2022-09-19 PROCEDURE — 1126F AMNT PAIN NOTED NONE PRSNT: CPT | Mod: CPTII,S$GLB,, | Performed by: INTERNAL MEDICINE

## 2022-09-19 PROCEDURE — 3074F SYST BP LT 130 MM HG: CPT | Mod: CPTII,S$GLB,, | Performed by: INTERNAL MEDICINE

## 2022-09-19 NOTE — ASSESSMENT & PLAN NOTE
Dx Parkinsons approx 4 years; Sees Dr. Flanagan for PD;  is primary caregiver; Home health services

## 2022-09-19 NOTE — PROGRESS NOTES
Subjective:    Patient ID:  Radha Alas is a 82 y.o. female patient here for evaluation Atrial Fibrillation and Hypertension      History of Present Illness:   83 yo female in office for 6 month check up; has no complaints today;    is primary caregiver for pt, diagnosed PD 4 yrs ago   monitors BP periodically, usually 140/70s; eats regular diet, tries to avoid salt intake  Pt denies shortness of breath, chest pain, dizziness, palpitations, leg swelling, syncope  Chronic afib -taking medications as directed daily without problem   reports no new concerns  Pt sees Dr. Funes PCP for labs          Review of patient's allergies indicates:   Allergen Reactions    Glucophage  [metformin]      Other reaction(s): Diarrhea  Other reaction(s): Diarrhea    Sulfa (sulfonamide antibiotics) Itching     Other reaction(s): Itching  Other reaction(s): Itching       Past Medical History:   Diagnosis Date    Anemia     Atrial fibrillation     Chronic constipation     Deep vein thrombosis of left lower extremity     Diabetes mellitus     TYPE 2    Diverticulosis     Hypertension     Obesity     Parkinson disease     Peptic ulcer of stomach     Prolapse of female bladder, acquired     Rectocele     Uterine cancer 11-1-2011    STAGE 1-A GRADE 2     Past Surgical History:   Procedure Laterality Date    COLONOSCOPY  prior to 2000    COLONOSCOPY N/A 9/28/2016    Procedure: COLONOSCOPY;  Surgeon: Baljinder Ospina MD;  Location: [facility];  Service: Endoscopy;  Laterality: N/A;    FLEXIBLE SIGMOIDOSCOPY  06/05/2016    at [facility]- in media section: poor prep, diverticulosis noted with old clots, internal hemorrhoids otherwise normal findings- recommend outpatient colonoscopy    FOREARM SURGERY      right forearm surgery    HERNIA REPAIR      ROBOTIC ASST    HYSTERECTOMY  11/1/2001    TLH/BSO--cancer    PELVIC AND PARA-AORTIC LYMPH NODE DISSECTION      MA REMOVAL OF OVARY/TUBE(S)      ROBOTIC ASST    TUBAL LIGATION       UPPER GASTROINTESTINAL ENDOSCOPY       Social History     Tobacco Use    Smoking status: Never    Smokeless tobacco: Never   Substance Use Topics    Alcohol use: No     Alcohol/week: 0.0 standard drinks    Drug use: No        Review of Systems:    As noted in HPI in addition      REVIEW OF SYSTEMS  CARDIOVASCULAR: No recent chest pain, palpitations, arm, neck, or jaw pain  RESPIRATORY: No recent fever, cough chills, SOB or congestion; no recent covid infection  : No blood in the urine  GI: No Nausea, vomiting, constipation, diarrhea, blood, or reflux.  MUSCULOSKELETAL: No myalgias  NEURO: No lightheadedness or dizziness  EYES: No Double vision, blurry, vision or headache              Objective        Vitals:    09/19/22 1339   BP: 124/80   Pulse: 74   Temp: 97.9 °F (36.6 °C)       LIPIDS - LAST 2   Lab Results   Component Value Date    CHOL 172 08/12/2020    CHOL 161 10/04/2019    HDL 62 08/12/2020    HDL 70 10/04/2019    LDLCALC 85.0 08/12/2020    LDLCALC 77.0 10/04/2019    TRIG 125 08/12/2020    TRIG 70 10/04/2019    CHOLHDL 36.0 08/12/2020    CHOLHDL 43.5 10/04/2019       CBC - LAST 2  Lab Results   Component Value Date    WBC 8.54 04/29/2022    WBC 6.23 08/25/2021    RBC 4.54 04/29/2022    RBC 4.44 08/25/2021    HGB 13.4 04/29/2022    HGB 13.7 08/25/2021    HCT 42.1 04/29/2022    HCT 42.1 08/25/2021    MCV 93 04/29/2022    MCV 95 08/25/2021    MCH 29.5 04/29/2022    MCH 30.9 08/25/2021    MCHC 31.8 (L) 04/29/2022    MCHC 32.5 08/25/2021    RDW 12.7 04/29/2022    RDW 12.4 08/25/2021     04/29/2022     08/25/2021    MPV 10.1 04/29/2022    MPV 10.2 08/25/2021    GRAN 5.1 04/29/2022    GRAN 60.0 04/29/2022    LYMPH 2.8 04/29/2022    LYMPH 32.8 04/29/2022    MONO 0.5 04/29/2022    MONO 5.7 04/29/2022    BASO 0.04 04/29/2022    BASO 0.03 08/25/2021    NRBC 0 04/29/2022    NRBC 0 08/25/2021       CHEMISTRY & LIVER FUNCTION - LAST 2  Lab Results   Component Value Date     04/29/2022      08/25/2021    K 4.0 04/29/2022    K 4.5 08/25/2021     04/29/2022     08/25/2021    CO2 26 04/29/2022    CO2 28 08/25/2021    ANIONGAP 10 04/29/2022    ANIONGAP 11 08/25/2021    BUN 13 04/29/2022    BUN 11 08/25/2021    CREATININE 0.7 04/29/2022    CREATININE 0.7 08/25/2021     (H) 04/29/2022    GLU 81 08/25/2021    CALCIUM 9.4 04/29/2022    CALCIUM 9.5 08/25/2021    MG 1.8 08/25/2021    MG 1.9 04/22/2020    ALBUMIN 3.3 (L) 04/29/2022    ALBUMIN 3.5 08/25/2021    PROT 7.2 04/29/2022    PROT 7.3 08/25/2021    ALKPHOS 81 04/29/2022    ALKPHOS 78 08/25/2021    ALT 11 04/29/2022    ALT 12 08/25/2021    AST 16 04/29/2022    AST 18 08/25/2021    BILITOT 0.5 04/29/2022    BILITOT 0.5 08/25/2021        CARDIAC PROFILE - LAST 2  No results found for: BNP, CPK, CPKMB, LDH, TROPONINI     COAGULATION - LAST 2  Lab Results   Component Value Date    LABPT 19.1 (H) 04/20/2020    INR 1.7 04/20/2020    INR 1.5 (H) 10/06/2016    APTT 24.0 04/20/2020       ENDOCRINE & PSA - LAST 2  Lab Results   Component Value Date    HGBA1C 7.3 (H) 04/29/2022    HGBA1C 6.2 (H) 08/25/2021    MICROALBUR 4.0 09/12/2017    TSH 2.350 04/20/2020    TSH 0.666 10/04/2019        ECHOCARDIOGRAM RESULTS  No results found for this or any previous visit.      CURRENT/PREVIOUS VISIT EKG  Results for orders placed or performed during the hospital encounter of 04/20/20   EKG 12-lead    Collection Time: 04/20/20  8:09 AM    Narrative    Test Reason : R00.0,    Vent. Rate : 110 BPM     Atrial Rate : 110 BPM     P-R Int : 000 ms          QRS Dur : 082 ms      QT Int : 302 ms       P-R-T Axes : 000 017 062 degrees     QTc Int : 408 ms    Atrial fibrillation with rapid ventricular response  Nonspecific ST abnormality  Abnormal ECG  When compared with ECG of 06-OCT-2011 12:06,  Nonspecific T wave abnormality no longer evident in Inferior leads  Nonspecific T wave abnormality, improved in Lateral leads  Confirmed by Anoop Vail MD ([id number]) on 4/21/2020  "11:10:19 AM    Referred By:             Confirmed By:Anoop Vail MD     No valid procedures specified.   No results found for this or any previous visit.    No valid procedures specified.    PHYSICAL EXAM  CONSTITUTIONAL: Well built, well nourished in no apparent distress  NECK: no carotid bruit, no JVD  LUNGS: CTA  CHEST WALL: no tenderness  HEART: regular rate and rhythm, S1, S2 normal, no murmur, click, rub or gallop   ABDOMEN: soft, non-tender; bowel sounds normal; no masses,  no organomegaly  EXTREMITIES: Extremities normal, no edema, no calf tenderness noted  NEURO: AAO X 3    I HAVE REVIEWED :    The vital signs, nurses notes, and all the pertinent radiology and labs.        Current Outpatient Medications   Medication Instructions    ACETAMINOPHEN (TYLENOL ARTHRITIS ORAL) Oral    busPIRone (BUSPAR) 7.5 mg, Oral, 3 times daily    diltiaZEM (CARTIA XT) 120 mg, Oral, Nightly    donepeziL (ARICEPT) 5 mg, Oral, Nightly    DROPLET PEN NEEDLE 31 gauge x 3/16" Ndle USE  TO  ADMINISTER  INSULIN ONE TIME DAILY    ELIQUIS 5 mg Tab TK 1 T PO BID    glimepiride (AMARYL) 4 mg, Oral, Before breakfast    LEVEMIR FLEXTOUCH U-100 INSULN 20 Units, Subcutaneous, Nightly    lisinopriL (PRINIVIL,ZESTRIL) 20 MG tablet 1 TABLET ONCE DAILY    omeprazole (PRILOSEC) 40 mg, Oral, Daily    polyethylene glycol (GLYCOLAX) 17 gram/dose powder TK 17 GRAMS PO QD UTD    selegiline (ELDEPRYL) 5 mg, Oral, 2 times daily    TRUE METRIX GLUCOSE TEST STRIP Strp Test glucose levels 2-3 daily for diabetes          Assessment & Plan     Parkinson disease  Dx Parkinsons approx 4 years; Sees Dr. Flanagan for PD;  is primary caregiver; Home health services    Chronic atrial fibrillation  rate controlled; no bleeding in stool, urine, gums; on Eliquis, Cardiezem    Anxiety  Buspar TID; PRN medications available; has not required use in past 3 weeks;     Hypertension associated with diabetes  BP ranges 140/70 at home  BP today 124/80;   Low salt " diet;   Continue Home health PT  Continue Lisinopril    Hyperlipidemia associated with type 2 diabetes mellitus  LDL 85 in 2020  Low cholesterol diet      History of DVT (deep vein thrombosis)  Denies swelling or pain in bilateral lower extremities    Weakness  PD  Has multiple assistive devices  Home health  Last fall 2 years ago        No follow-ups on file.

## 2022-09-19 NOTE — ASSESSMENT & PLAN NOTE
BP ranges 140/70 at home  BP today 124/80;   Low salt diet;   Continue Home health PT  Continue Lisinopril

## 2022-09-21 ENCOUNTER — TELEPHONE (OUTPATIENT)
Dept: HOME HEALTH SERVICES | Facility: CLINIC | Age: 82
End: 2022-09-21
Payer: MEDICARE

## 2022-09-21 NOTE — TELEPHONE ENCOUNTER
Patient spouse canceled 9/21/22 [facility] appointment, requesting follow up in 4 weeks.

## 2022-10-04 ENCOUNTER — TELEPHONE (OUTPATIENT)
Dept: FAMILY MEDICINE | Facility: CLINIC | Age: 82
End: 2022-10-04
Payer: MEDICARE

## 2022-10-04 ENCOUNTER — HOSPITAL ENCOUNTER (OUTPATIENT)
Facility: HOSPITAL | Age: 82
Discharge: HOME | End: 2022-10-06
Attending: EMERGENCY MEDICINE | Admitting: FAMILY MEDICINE
Payer: MEDICARE

## 2022-10-04 DIAGNOSIS — R07.9 CHEST PAIN: ICD-10-CM

## 2022-10-04 DIAGNOSIS — K92.2 GI BLEED: Primary | ICD-10-CM

## 2022-10-04 LAB
ABO + RH BLD: NORMAL
ALBUMIN SERPL BCP-MCNC: 3.2 G/DL (ref 3.5–5.2)
ALBUMIN SERPL BCP-MCNC: 3.2 G/DL (ref 3.5–5.2)
ALP SERPL-CCNC: 63 U/L (ref 55–135)
ALP SERPL-CCNC: 63 U/L (ref 55–135)
ALT SERPL W/O P-5'-P-CCNC: 14 U/L (ref 10–44)
ALT SERPL W/O P-5'-P-CCNC: 14 U/L (ref 10–44)
ANION GAP SERPL CALC-SCNC: 5 MMOL/L (ref 8–16)
ANION GAP SERPL CALC-SCNC: 5 MMOL/L (ref 8–16)
APTT PPP: 28.4 SEC (ref 23.3–35.1)
AST SERPL-CCNC: 18 U/L (ref 10–40)
AST SERPL-CCNC: 18 U/L (ref 10–40)
BASOPHILS # BLD AUTO: 0.03 K/UL (ref 0–0.2)
BASOPHILS NFR BLD: 0.4 % (ref 0–1.9)
BILIRUB SERPL-MCNC: 0.3 MG/DL (ref 0.1–1)
BILIRUB SERPL-MCNC: 0.3 MG/DL (ref 0.1–1)
BLD GP AB SCN CELLS X3 SERPL QL: NORMAL
BNP SERPL-MCNC: 73 PG/ML (ref 0–99)
BUN SERPL-MCNC: 15 MG/DL (ref 8–23)
BUN SERPL-MCNC: 15 MG/DL (ref 8–23)
CALCIUM SERPL-MCNC: 9 MG/DL (ref 8.7–10.5)
CALCIUM SERPL-MCNC: 9 MG/DL (ref 8.7–10.5)
CHLORIDE SERPL-SCNC: 103 MMOL/L (ref 95–110)
CHLORIDE SERPL-SCNC: 103 MMOL/L (ref 95–110)
CK SERPL-CCNC: 48 U/L (ref 20–180)
CO2 SERPL-SCNC: 29 MMOL/L (ref 23–29)
CO2 SERPL-SCNC: 29 MMOL/L (ref 23–29)
CREAT SERPL-MCNC: 0.6 MG/DL (ref 0.5–1.4)
CREAT SERPL-MCNC: 0.6 MG/DL (ref 0.5–1.4)
DIFFERENTIAL METHOD: NORMAL
EOSINOPHIL # BLD AUTO: 0.1 K/UL (ref 0–0.5)
EOSINOPHIL NFR BLD: 1 % (ref 0–8)
ERYTHROCYTE [DISTWIDTH] IN BLOOD BY AUTOMATED COUNT: 13 % (ref 11.5–14.5)
EST. GFR  (NO RACE VARIABLE): >60 ML/MIN/1.73 M^2
EST. GFR  (NO RACE VARIABLE): >60 ML/MIN/1.73 M^2
GLUCOSE SERPL-MCNC: 151 MG/DL (ref 70–110)
GLUCOSE SERPL-MCNC: 151 MG/DL (ref 70–110)
GLUCOSE SERPL-MCNC: 91 MG/DL (ref 70–110)
HCT VFR BLD AUTO: 37.7 % (ref 37–48.5)
HGB BLD-MCNC: 12.1 G/DL (ref 12–16)
IMM GRANULOCYTES # BLD AUTO: 0.03 K/UL (ref 0–0.04)
IMM GRANULOCYTES NFR BLD AUTO: 0.4 % (ref 0–0.5)
INR PPP: 1.4
LIPASE SERPL-CCNC: 20 U/L (ref 4–60)
LYMPHOCYTES # BLD AUTO: 2.6 K/UL (ref 1–4.8)
LYMPHOCYTES NFR BLD: 32.7 % (ref 18–48)
MAGNESIUM SERPL-MCNC: 1.7 MG/DL (ref 1.6–2.6)
MCH RBC QN AUTO: 30.3 PG (ref 27–31)
MCHC RBC AUTO-ENTMCNC: 32.1 G/DL (ref 32–36)
MCV RBC AUTO: 94 FL (ref 82–98)
MONOCYTES # BLD AUTO: 0.5 K/UL (ref 0.3–1)
MONOCYTES NFR BLD: 6 % (ref 4–15)
NEUTROPHILS # BLD AUTO: 4.7 K/UL (ref 1.8–7.7)
NEUTROPHILS NFR BLD: 59.5 % (ref 38–73)
NRBC BLD-RTO: 0 /100 WBC
PLATELET # BLD AUTO: 292 K/UL (ref 150–450)
PMV BLD AUTO: 9.8 FL (ref 9.2–12.9)
POTASSIUM SERPL-SCNC: 4.2 MMOL/L (ref 3.5–5.1)
POTASSIUM SERPL-SCNC: 4.2 MMOL/L (ref 3.5–5.1)
PROT SERPL-MCNC: 6.7 G/DL (ref 6–8.4)
PROT SERPL-MCNC: 6.7 G/DL (ref 6–8.4)
PROTHROMBIN TIME: 16 SEC (ref 11.4–13.7)
RBC # BLD AUTO: 4 M/UL (ref 4–5.4)
SARS-COV-2 RDRP RESP QL NAA+PROBE: NEGATIVE
SODIUM SERPL-SCNC: 137 MMOL/L (ref 136–145)
SODIUM SERPL-SCNC: 137 MMOL/L (ref 136–145)
TROPONIN I SERPL DL<=0.01 NG/ML-MCNC: <0.03 NG/ML
TSH SERPL DL<=0.005 MIU/L-ACNC: 0.58 UIU/ML (ref 0.34–5.6)
WBC # BLD AUTO: 7.87 K/UL (ref 3.9–12.7)

## 2022-10-04 PROCEDURE — 93010 ELECTROCARDIOGRAM REPORT: CPT | Mod: ,,, | Performed by: INTERNAL MEDICINE

## 2022-10-04 PROCEDURE — 85025 COMPLETE CBC W/AUTO DIFF WBC: CPT | Performed by: EMERGENCY MEDICINE

## 2022-10-04 PROCEDURE — 83880 ASSAY OF NATRIURETIC PEPTIDE: CPT | Performed by: EMERGENCY MEDICINE

## 2022-10-04 PROCEDURE — 96375 TX/PRO/DX INJ NEW DRUG ADDON: CPT

## 2022-10-04 PROCEDURE — 63600175 PHARM REV CODE 636 W HCPCS: Performed by: FAMILY MEDICINE

## 2022-10-04 PROCEDURE — G0378 HOSPITAL OBSERVATION PER HR: HCPCS

## 2022-10-04 PROCEDURE — 83735 ASSAY OF MAGNESIUM: CPT | Performed by: EMERGENCY MEDICINE

## 2022-10-04 PROCEDURE — 82550 ASSAY OF CK (CPK): CPT | Performed by: EMERGENCY MEDICINE

## 2022-10-04 PROCEDURE — 93010 EKG 12-LEAD: ICD-10-PCS | Mod: ,,, | Performed by: INTERNAL MEDICINE

## 2022-10-04 PROCEDURE — 85610 PROTHROMBIN TIME: CPT | Performed by: EMERGENCY MEDICINE

## 2022-10-04 PROCEDURE — 82962 GLUCOSE BLOOD TEST: CPT

## 2022-10-04 PROCEDURE — 84443 ASSAY THYROID STIM HORMONE: CPT | Performed by: EMERGENCY MEDICINE

## 2022-10-04 PROCEDURE — 85730 THROMBOPLASTIN TIME PARTIAL: CPT | Performed by: EMERGENCY MEDICINE

## 2022-10-04 PROCEDURE — C9113 INJ PANTOPRAZOLE SODIUM, VIA: HCPCS | Performed by: FAMILY MEDICINE

## 2022-10-04 PROCEDURE — 99285 EMERGENCY DEPT VISIT HI MDM: CPT | Mod: 25

## 2022-10-04 PROCEDURE — U0002 COVID-19 LAB TEST NON-CDC: HCPCS | Performed by: EMERGENCY MEDICINE

## 2022-10-04 PROCEDURE — 93005 ELECTROCARDIOGRAM TRACING: CPT | Performed by: INTERNAL MEDICINE

## 2022-10-04 PROCEDURE — 25000003 PHARM REV CODE 250: Performed by: FAMILY MEDICINE

## 2022-10-04 PROCEDURE — 96365 THER/PROPH/DIAG IV INF INIT: CPT

## 2022-10-04 PROCEDURE — 84484 ASSAY OF TROPONIN QUANT: CPT | Performed by: EMERGENCY MEDICINE

## 2022-10-04 PROCEDURE — 86850 RBC ANTIBODY SCREEN: CPT | Performed by: EMERGENCY MEDICINE

## 2022-10-04 PROCEDURE — 63600175 PHARM REV CODE 636 W HCPCS: Performed by: EMERGENCY MEDICINE

## 2022-10-04 PROCEDURE — G0378 HOSPITAL OBSERVATION PER HR: HCPCS | Mod: CS

## 2022-10-04 PROCEDURE — 96366 THER/PROPH/DIAG IV INF ADDON: CPT

## 2022-10-04 PROCEDURE — 83690 ASSAY OF LIPASE: CPT | Performed by: EMERGENCY MEDICINE

## 2022-10-04 PROCEDURE — 80053 COMPREHEN METABOLIC PANEL: CPT | Performed by: EMERGENCY MEDICINE

## 2022-10-04 RX ORDER — HYDRALAZINE HYDROCHLORIDE 20 MG/ML
10 INJECTION INTRAMUSCULAR; INTRAVENOUS
Status: COMPLETED | OUTPATIENT
Start: 2022-10-04 | End: 2022-10-04

## 2022-10-04 RX ORDER — TALC
6 POWDER (GRAM) TOPICAL NIGHTLY PRN
Status: DISCONTINUED | OUTPATIENT
Start: 2022-10-04 | End: 2022-10-06 | Stop reason: HOSPADM

## 2022-10-04 RX ORDER — AMOXICILLIN 250 MG
1 CAPSULE ORAL 2 TIMES DAILY PRN
Status: DISCONTINUED | OUTPATIENT
Start: 2022-10-04 | End: 2022-10-06 | Stop reason: HOSPADM

## 2022-10-04 RX ORDER — HYDRALAZINE HYDROCHLORIDE 20 MG/ML
10 INJECTION INTRAMUSCULAR; INTRAVENOUS EVERY 4 HOURS PRN
Status: DISCONTINUED | OUTPATIENT
Start: 2022-10-04 | End: 2022-10-06 | Stop reason: HOSPADM

## 2022-10-04 RX ORDER — ONDANSETRON 2 MG/ML
4 INJECTION INTRAMUSCULAR; INTRAVENOUS EVERY 6 HOURS PRN
Status: DISCONTINUED | OUTPATIENT
Start: 2022-10-04 | End: 2022-10-06 | Stop reason: HOSPADM

## 2022-10-04 RX ORDER — MAGNESIUM SULFATE HEPTAHYDRATE 40 MG/ML
2 INJECTION, SOLUTION INTRAVENOUS ONCE
Status: COMPLETED | OUTPATIENT
Start: 2022-10-04 | End: 2022-10-05

## 2022-10-04 RX ORDER — NALOXONE HCL 0.4 MG/ML
0.02 VIAL (ML) INJECTION
Status: DISCONTINUED | OUTPATIENT
Start: 2022-10-04 | End: 2022-10-06 | Stop reason: HOSPADM

## 2022-10-04 RX ORDER — IBUPROFEN 200 MG
24 TABLET ORAL
Status: DISCONTINUED | OUTPATIENT
Start: 2022-10-04 | End: 2022-10-06 | Stop reason: HOSPADM

## 2022-10-04 RX ORDER — SODIUM CHLORIDE 0.9 % (FLUSH) 0.9 %
10 SYRINGE (ML) INJECTION
Status: DISCONTINUED | OUTPATIENT
Start: 2022-10-04 | End: 2022-10-06 | Stop reason: HOSPADM

## 2022-10-04 RX ORDER — BUSPIRONE HYDROCHLORIDE 7.5 MG/1
7.5 TABLET ORAL 3 TIMES DAILY
Status: DISCONTINUED | OUTPATIENT
Start: 2022-10-04 | End: 2022-10-04

## 2022-10-04 RX ORDER — HYDRALAZINE HYDROCHLORIDE 20 MG/ML
5 INJECTION INTRAMUSCULAR; INTRAVENOUS EVERY 4 HOURS PRN
Status: DISCONTINUED | OUTPATIENT
Start: 2022-10-04 | End: 2022-10-06 | Stop reason: HOSPADM

## 2022-10-04 RX ORDER — INSULIN ASPART 100 [IU]/ML
1-10 INJECTION, SOLUTION INTRAVENOUS; SUBCUTANEOUS
Status: DISCONTINUED | OUTPATIENT
Start: 2022-10-04 | End: 2022-10-06 | Stop reason: HOSPADM

## 2022-10-04 RX ORDER — BUSPIRONE HYDROCHLORIDE 5 MG/1
5 TABLET ORAL 2 TIMES DAILY
Status: DISCONTINUED | OUTPATIENT
Start: 2022-10-04 | End: 2022-10-06 | Stop reason: HOSPADM

## 2022-10-04 RX ORDER — DILTIAZEM HYDROCHLORIDE 120 MG/1
120 CAPSULE, COATED, EXTENDED RELEASE ORAL NIGHTLY
Status: DISCONTINUED | OUTPATIENT
Start: 2022-10-04 | End: 2022-10-06 | Stop reason: HOSPADM

## 2022-10-04 RX ORDER — PANTOPRAZOLE SODIUM 40 MG/10ML
40 INJECTION, POWDER, LYOPHILIZED, FOR SOLUTION INTRAVENOUS 2 TIMES DAILY
Status: DISCONTINUED | OUTPATIENT
Start: 2022-10-04 | End: 2022-10-05

## 2022-10-04 RX ORDER — MORPHINE SULFATE 4 MG/ML
4 INJECTION, SOLUTION INTRAMUSCULAR; INTRAVENOUS EVERY 4 HOURS PRN
Status: DISCONTINUED | OUTPATIENT
Start: 2022-10-04 | End: 2022-10-06 | Stop reason: HOSPADM

## 2022-10-04 RX ORDER — IPRATROPIUM BROMIDE AND ALBUTEROL SULFATE 2.5; .5 MG/3ML; MG/3ML
3 SOLUTION RESPIRATORY (INHALATION) EVERY 4 HOURS PRN
Status: DISCONTINUED | OUTPATIENT
Start: 2022-10-04 | End: 2022-10-06 | Stop reason: HOSPADM

## 2022-10-04 RX ORDER — IBUPROFEN 200 MG
16 TABLET ORAL
Status: DISCONTINUED | OUTPATIENT
Start: 2022-10-04 | End: 2022-10-06 | Stop reason: HOSPADM

## 2022-10-04 RX ORDER — POLYETHYLENE GLYCOL 3350 17 G/17G
17 POWDER, FOR SOLUTION ORAL 2 TIMES DAILY PRN
Status: DISCONTINUED | OUTPATIENT
Start: 2022-10-04 | End: 2022-10-06 | Stop reason: HOSPADM

## 2022-10-04 RX ORDER — GLUCAGON 1 MG
1 KIT INJECTION
Status: DISCONTINUED | OUTPATIENT
Start: 2022-10-04 | End: 2022-10-06 | Stop reason: HOSPADM

## 2022-10-04 RX ORDER — ACETAMINOPHEN 325 MG/1
650 TABLET ORAL EVERY 8 HOURS PRN
Status: DISCONTINUED | OUTPATIENT
Start: 2022-10-04 | End: 2022-10-06 | Stop reason: HOSPADM

## 2022-10-04 RX ORDER — SIMETHICONE 80 MG
1 TABLET,CHEWABLE ORAL 4 TIMES DAILY PRN
Status: DISCONTINUED | OUTPATIENT
Start: 2022-10-04 | End: 2022-10-06 | Stop reason: HOSPADM

## 2022-10-04 RX ORDER — HYDROCODONE BITARTRATE AND ACETAMINOPHEN 5; 325 MG/1; MG/1
1 TABLET ORAL EVERY 4 HOURS PRN
Status: DISCONTINUED | OUTPATIENT
Start: 2022-10-04 | End: 2022-10-06 | Stop reason: HOSPADM

## 2022-10-04 RX ADMIN — BUSPIRONE HYDROCHLORIDE 2.5 MG: 5 TABLET ORAL at 10:10

## 2022-10-04 RX ADMIN — PANTOPRAZOLE SODIUM 40 MG: 40 INJECTION, POWDER, FOR SOLUTION INTRAVENOUS at 10:10

## 2022-10-04 RX ADMIN — HYDRALAZINE HYDROCHLORIDE 10 MG: 20 INJECTION INTRAMUSCULAR; INTRAVENOUS at 04:10

## 2022-10-04 RX ADMIN — DILTIAZEM HYDROCHLORIDE 120 MG: 120 CAPSULE, COATED, EXTENDED RELEASE ORAL at 10:10

## 2022-10-04 RX ADMIN — BUSPIRONE HYDROCHLORIDE 5 MG: 5 TABLET ORAL at 10:10

## 2022-10-04 RX ADMIN — MAGNESIUM SULFATE HEPTAHYDRATE 2 G: 40 INJECTION, SOLUTION INTRAVENOUS at 10:10

## 2022-10-04 NOTE — TELEPHONE ENCOUNTER
Patient's  (Isak) reports patient experiencing moderate amount of bleeding from rectum this morning. Writer advised for patient to be seen at ER for evaluation. Mr. Parisi verbalized understanding.

## 2022-10-04 NOTE — ED PROVIDER NOTES
Encounter Date: 10/4/2022       History     Chief Complaint   Patient presents with    Rectal Bleeding     New onset of dark red blood in stool. Pt transported via EMS. Family on scene states that she had a colonoscopy a few years ago and that they nicked her colon. Pt has had gi issues since. Pt has no complaints at this time.       82-year-old female presents complaining of having blood in her stool this morning.  The patient does wear diaper.  Blood was noted in her stool this morning.  Her  reports that was quite a bit of blood.  The stool was somewhat loose and maroon-colored.  She is on Eliquis per atrial fibrillation.  He did get her the dose of Eliquis this morning.  She denies pain or discomfort.  She denies chest pain or shortness of breath.  She denies any abdominal pain nausea vomiting or diarrhea.  She denies lightheadedness syncope or near-syncope.  She denies any other problems or complaints.  Past medical history includes:  · Anemia   · Atrial fibrillation   · Chronic constipation   · Deep vein thrombosis of left lower extremity   · Diabetes mellitus    TYPE 2  · Diverticulosis   · Hypertension   · Obesity   · Parkinson disease   · Peptic ulcer of stomach   · Prolapse of female bladder, acquired   · Rectocele   · Uterine cancer 11-1-2011   STAGE 1-A GRADE 2        Review of patient's allergies indicates:   Allergen Reactions    Glucophage  [metformin]      Other reaction(s): Diarrhea  Other reaction(s): Diarrhea    Sulfa (sulfonamide antibiotics) Itching     Other reaction(s): Itching  Other reaction(s): Itching     Past Medical History:   Diagnosis Date    Anemia     Atrial fibrillation     Chronic constipation     Deep vein thrombosis of left lower extremity     Diabetes mellitus     TYPE 2    Diverticulosis     Hypertension     Obesity     Parkinson disease     Peptic ulcer of stomach     Prolapse of female bladder, acquired     Rectocele     Uterine cancer 11-1-2011    STAGE 1-A GRADE 2      Past Surgical History:   Procedure Laterality Date    COLONOSCOPY  prior to 2000    COLONOSCOPY N/A 9/28/2016    Procedure: COLONOSCOPY;  Surgeon: Baljinder Ospina MD;  Location: [facility];  Service: Endoscopy;  Laterality: N/A;    FLEXIBLE SIGMOIDOSCOPY  06/05/2016    at [facility]- in media section: poor prep, diverticulosis noted with old clots, internal hemorrhoids otherwise normal findings- recommend outpatient colonoscopy    FOREARM SURGERY      right forearm surgery    HERNIA REPAIR      ROBOTIC ASST    HYSTERECTOMY  11/1/2001    TLH/BSO--cancer    PELVIC AND PARA-AORTIC LYMPH NODE DISSECTION      VT REMOVAL OF OVARY/TUBE(S)      ROBOTIC ASST    TUBAL LIGATION      UPPER GASTROINTESTINAL ENDOSCOPY       Family History   Problem Relation Age of Onset    Heart disease Mother     Diabetes Mother     Hypertension Father     Heart disease Sister     Diabetes Maternal Grandmother     Hypertension Maternal Grandmother     No Known Problems Brother     No Known Problems Daughter     No Known Problems Son     No Known Problems Maternal Aunt     No Known Problems Maternal Uncle     No Known Problems Paternal Aunt     No Known Problems Paternal Uncle     No Known Problems Maternal Grandfather     No Known Problems Paternal Grandmother     No Known Problems Paternal Grandfather     Breast cancer Neg Hx     Ovarian cancer Neg Hx     Uterine cancer Neg Hx     Colon cancer Neg Hx     Cancer Neg Hx     Cervical cancer Neg Hx     Endometrial cancer Neg Hx     Vaginal cancer Neg Hx     Colon polyps Neg Hx     Crohn's disease Neg Hx     Ulcerative colitis Neg Hx      Social History     Tobacco Use    Smoking status: Never    Smokeless tobacco: Never   Substance Use Topics    Alcohol use: No     Alcohol/week: 0.0 standard drinks    Drug use: No     Review of Systems   Constitutional: Negative.  Negative for fever.   HENT: Negative.     Respiratory: Negative.     Cardiovascular: Negative.    Gastrointestinal:  Positive for blood in  stool and diarrhea. Negative for abdominal distention, abdominal pain, constipation and nausea.   Genitourinary: Negative.    Musculoskeletal: Negative.  Negative for back pain, myalgias, neck pain and neck stiffness.   Skin: Negative.  Negative for color change, pallor, rash and wound.   Neurological:  Positive for tremors (history of Parkinson's disease). Negative for dizziness, syncope, facial asymmetry, weakness, light-headedness and headaches.   All other systems reviewed and are negative.    Physical Exam     Initial Vitals [10/04/22 1302]   BP Pulse Resp Temp SpO2   (!) 176/96 88 18 97.9 °F (36.6 °C) 98 %      MAP       --         Physical Exam    Nursing note and vitals reviewed.  Constitutional: She is active and cooperative. She does not have a sickly appearance. She does not appear ill.   Diffuse tremors, consistent with patient's Parkinson's disease   HENT:   Head: Normocephalic and atraumatic.   Nose: Nose normal. No mucosal edema.   Mouth/Throat: Uvula is midline, oropharynx is clear and moist and mucous membranes are normal. No oral lesions. No uvula swelling. No oropharyngeal exudate, posterior oropharyngeal edema or posterior oropharyngeal erythema.   Eyes: Conjunctivae, EOM and lids are normal. Pupils are equal, round, and reactive to light. No scleral icterus.   Neck: Trachea normal and phonation normal. Neck supple. No thyroid mass and no thyromegaly present. No stridor present. No JVD present.   Normal range of motion.   Full passive range of motion without pain.     Cardiovascular:  Normal rate, regular rhythm, normal heart sounds, intact distal pulses and normal pulses.     Exam reveals no gallop and no decreased pulses.       No murmur heard.  Pulmonary/Chest: Effort normal and breath sounds normal. No accessory muscle usage or stridor. No tachypnea. No respiratory distress. She has no decreased breath sounds. She has no wheezes. She has no rhonchi. She has no rales.   Abdominal: Abdomen is  soft. Bowel sounds are normal. She exhibits no distension, no pulsatile midline mass and no mass. There is no abdominal tenderness.   Abdomen soft and completely nontender throughout.   No right CVA tenderness.  No left CVA tenderness. There is no rigidity and no guarding.   Genitourinary: Rectum:      No rectal mass, anal fissure, external hemorrhoid, internal hemorrhoid or abnormal anal tone.      Genitourinary Comments: Maroon-colored stool present     Musculoskeletal:         General: No tenderness or edema. Normal range of motion.      Right hand: Normal. Normal range of motion. Normal strength. Normal sensation. Normal capillary refill. Normal pulse.      Left hand: Normal. Normal range of motion. Normal strength. Normal sensation. Normal capillary refill. Normal pulse.      Cervical back: Normal, full passive range of motion without pain, normal range of motion and neck supple. No edema, erythema, rigidity or bony tenderness. No spinous process tenderness or muscular tenderness. Normal range of motion.      Thoracic back: Normal. No bony tenderness. Normal range of motion.      Lumbar back: Normal. No bony tenderness. Normal range of motion.      Right foot: Normal. Normal range of motion and normal capillary refill. No tenderness or bony tenderness. Normal pulse.      Left foot: Normal. Normal range of motion and normal capillary refill. No tenderness or bony tenderness. Normal pulse.      Comments: Pulses are 2+ throughout, cap refill is less than 2 sec throughout, extremities are nontender throughout with full range of motion. There is no spinal tenderness to palpation.     Neurological: She is alert and oriented to person, place, and time. She has normal strength. She displays tremor. No cranial nerve deficit or sensory deficit.   No focal deficits.  Mild diffuse tremor consistent with patient's known Parkinson's disease   Skin: Skin is warm and dry. Capillary refill takes less than 2 seconds. No  ecchymosis, no petechiae and no rash noted. No erythema. No pallor.   Sacral area with small stage II decubitus, no erythema, no cellulitis.   Psychiatric: She has a normal mood and affect. Her speech is normal and behavior is normal. Judgment and thought content normal. Cognition and memory are normal.       ED Course   Procedures  Labs Reviewed   COMPREHENSIVE METABOLIC PANEL - Abnormal; Notable for the following components:       Result Value    Glucose 151 (*)     Albumin 3.2 (*)     Anion Gap 5 (*)     All other components within normal limits    Narrative:     Recoll. [id number] by STEPHANIE at 10/04/2022 15:56, reason: grossly   hemolyzed. recollect called to [facility]   COMPREHENSIVE METABOLIC PANEL - Abnormal; Notable for the following components:    Glucose 151 (*)     Albumin 3.2 (*)     Anion Gap 5 (*)     All other components within normal limits    Narrative:     Recoll. [id number] by STEPHANIE at 10/04/2022 15:56, reason: grossly   hemolyzed. recollect called to [facility]   PROTIME-INR - Abnormal; Notable for the following components:    PT 16.0 (*)     All other components within normal limits    Narrative:     Recoll. [id number] by STEPHANIE at 10/04/2022 15:58, reason: grossly   hemolyzed. recollect called to [facility]   CBC W/ AUTO DIFFERENTIAL   APTT    Narrative:     Recoll. [id number] by STEPHANIE at 10/04/2022 15:58, reason: grossly   hemolyzed. recollect called to [facility]   LIPASE    Narrative:     Recoll. [id number] by STEPHANIE at 10/04/2022 15:56, reason: grossly   hemolyzed. recollect called to [facility]   CK    Narrative:     Recoll. [id number] by [email or web address] at 10/04/2022 15:56, reason: grossly   hemolyzed. recollect called to [facility]   B-TYPE NATRIURETIC PEPTIDE   MAGNESIUM    Narrative:     Recoll. [id number] by [email or web address] at 10/04/2022 15:56, reason: grossly   hemolyzed. recollect called to [facility]   TROPONIN I    Narrative:     Recoll. [id number] by STEPHANIE at  10/04/2022 15:56, reason: grossly   hemolyzed. recollect called to [facility]   TSH    Narrative:     Recoll. [id number] by [email or web address] at 10/04/2022 15:56, reason: grossly   hemolyzed. recollect called to [facility]   SARS-COV-2 RNA AMPLIFICATION, QUAL   URINALYSIS, REFLEX TO URINE CULTURE   TYPE & SCREEN        ECG Results              EKG 12-lead (In process)  Result time 10/04/22 15:49:24      In process by Interface, Lab In [facility] (10/04/22 15:49:24)                   Narrative:    Test Reason : K92.2,    Vent. Rate : 075 BPM     Atrial Rate : 227 BPM     P-R Int : 000 ms          QRS Dur : 080 ms      QT Int : 356 ms       P-R-T Axes : 000 -11 015 degrees     QTc Int : 397 ms    Atrial fibrillation  Abnormal ECG  When compared with ECG of 20-APR-2020 08:09,  Non-specific change in ST segment in Lateral leads  Nonspecific T wave abnormality now evident in Inferior leads  T wave amplitude has decreased in Anterior leads    Referred By: [email or web address]   SELF           Confirmed By:                                   Imaging Results              X-Ray Chest 1 View (Final result)  Result time 10/04/22 14:45:47      Final result by Danyel Mccartney MD (10/04/22 14:45:47)                   Narrative:    XR CHEST 1 VIEW    CLINICAL HISTORY:  82 years Female GI bleed    COMPARISON: None    FINDINGS: Cardiac silhouette size is borderline enlarged for portable technique. No confluent airspace disease. No large pleural effusion or pneumothorax. No acute osseous abnormality.    IMPRESSION: No acute pulmonary process.    Electronically signed by:  Danyel Mccartney MD  10/4/2022 2:45 PM CDT Workstation: [id number]                                     Medications   hydrALAZINE injection 10 mg (has no administration in time range)   hydrALAZINE injection 5 mg (has no administration in time range)   magnesium sulfate 2g in water 50mL IVPB (premix) (2 g Intravenous New Bag 10/4/22 2200)   pantoprazole injection 40 mg (40 mg  Intravenous Given 10/4/22 2200)   diltiaZEM 24 hr capsule 120 mg (120 mg Oral Given 10/4/22 2200)   sodium chloride 0.9% flush 10 mL (has no administration in time range)   albuterol-ipratropium 2.5 mg-0.5 mg/3 mL nebulizer solution 3 mL (has no administration in time range)   melatonin tablet 6 mg (has no administration in time range)   ondansetron injection 4 mg (has no administration in time range)   polyethylene glycol packet 17 g (has no administration in time range)   senna-docusate 8.6-50 mg per tablet 1 tablet (has no administration in time range)   acetaminophen tablet 650 mg (has no administration in time range)   simethicone chewable tablet 80 mg (has no administration in time range)   HYDROcodone-acetaminophen 5-325 mg per tablet 1 tablet (has no administration in time range)   morphine injection 4 mg (has no administration in time range)   naloxone 0.4 mg/mL injection 0.02 mg (has no administration in time range)   glucose chewable tablet 16 g (has no administration in time range)   glucose chewable tablet 24 g (has no administration in time range)   dextrose 50% injection 12.5 g (has no administration in time range)   dextrose 50% injection 25 g (has no administration in time range)   glucagon (human recombinant) injection 1 mg (has no administration in time range)   insulin aspart U-100 pen 1-10 Units (has no administration in time range)   busPIRone tablet 5 mg (5 mg Oral Given 10/4/22 2222)     And   busPIRone split tablet 2.5 mg (2.5 mg Oral Given 10/4/22 2222)   hydrALAZINE injection 10 mg (10 mg Intravenous Given 10/4/22 1643)     Medical Decision Making:   Clinical Tests:   Lab Tests: Reviewed  Radiological Study: Reviewed  Medical Tests: Reviewed  ED Management:  The patient has remained hemodynamically stable.  No episodes of bloody stool in the emergency room but did have a large bloody stool before coming, evidence of maroon stool on exam.  Patient is on Eliquis.  I have discussed the case  with the hospitalist provider who has assumed care will admit.                        Clinical Impression:   Final diagnoses:  [K92.2] GI bleed        ED Disposition Condition    Observation                 Stephanie Centeno MD  10/04/22 [id number]

## 2022-10-04 NOTE — TELEPHONE ENCOUNTER
----- Message from Julieth Montes sent at 10/4/2022 11:41 AM CDT -----  Contact: Isak at [phone]  Type: Needs Medical Advice  Who Called:  pt's  Isak Argueta Call Back Number: [phone]    Additional Information: Isak is calling the office requesting a call back he states she is bleeding from the rectum. Please call back and advise.

## 2022-10-05 LAB
ALBUMIN SERPL BCP-MCNC: 3.2 G/DL (ref 3.5–5.2)
ALP SERPL-CCNC: 59 U/L (ref 55–135)
ALT SERPL W/O P-5'-P-CCNC: 16 U/L (ref 10–44)
ANION GAP SERPL CALC-SCNC: 7 MMOL/L (ref 8–16)
AST SERPL-CCNC: 16 U/L (ref 10–40)
BACTERIA #/AREA URNS HPF: ABNORMAL /HPF
BASOPHILS # BLD AUTO: 0.02 K/UL (ref 0–0.2)
BASOPHILS # BLD AUTO: 0.03 K/UL (ref 0–0.2)
BASOPHILS NFR BLD: 0.2 % (ref 0–1.9)
BASOPHILS NFR BLD: 0.4 % (ref 0–1.9)
BILIRUB SERPL-MCNC: 0.9 MG/DL (ref 0.1–1)
BILIRUB UR QL STRIP: NEGATIVE
BUN SERPL-MCNC: 15 MG/DL (ref 8–23)
CALCIUM SERPL-MCNC: 8.6 MG/DL (ref 8.7–10.5)
CHLORIDE SERPL-SCNC: 103 MMOL/L (ref 95–110)
CLARITY UR: CLEAR
CO2 SERPL-SCNC: 26 MMOL/L (ref 23–29)
COLOR UR: COLORLESS
CREAT SERPL-MCNC: 0.6 MG/DL (ref 0.5–1.4)
DIFFERENTIAL METHOD: ABNORMAL
EOSINOPHIL # BLD AUTO: 0.1 K/UL (ref 0–0.5)
EOSINOPHIL NFR BLD: 1 % (ref 0–8)
EOSINOPHIL NFR BLD: 1.1 % (ref 0–8)
EOSINOPHIL NFR BLD: 1.5 % (ref 0–8)
EOSINOPHIL NFR BLD: 1.7 % (ref 0–8)
ERYTHROCYTE [DISTWIDTH] IN BLOOD BY AUTOMATED COUNT: 12.9 % (ref 11.5–14.5)
ERYTHROCYTE [DISTWIDTH] IN BLOOD BY AUTOMATED COUNT: 13 % (ref 11.5–14.5)
ERYTHROCYTE [DISTWIDTH] IN BLOOD BY AUTOMATED COUNT: 13.1 % (ref 11.5–14.5)
ERYTHROCYTE [DISTWIDTH] IN BLOOD BY AUTOMATED COUNT: 13.1 % (ref 11.5–14.5)
ERYTHROCYTE [DISTWIDTH] IN BLOOD BY AUTOMATED COUNT: 13.2 % (ref 11.5–14.5)
EST. GFR  (NO RACE VARIABLE): >60 ML/MIN/1.73 M^2
ESTIMATED AVG GLUCOSE: NORMAL MG/DL (ref 68–131)
GLUCOSE SERPL-MCNC: 126 MG/DL (ref 70–110)
GLUCOSE SERPL-MCNC: 127 MG/DL (ref 70–110)
GLUCOSE SERPL-MCNC: 129 MG/DL (ref 70–110)
GLUCOSE SERPL-MCNC: 138 MG/DL (ref 70–110)
GLUCOSE SERPL-MCNC: 165 MG/DL (ref 70–110)
GLUCOSE UR QL STRIP: NEGATIVE
HBA1C MFR BLD: NORMAL % (ref 4.5–6.2)
HCT VFR BLD AUTO: 36.3 % (ref 37–48.5)
HCT VFR BLD AUTO: 36.7 % (ref 37–48.5)
HCT VFR BLD AUTO: 38 % (ref 37–48.5)
HCT VFR BLD AUTO: 39.6 % (ref 37–48.5)
HCT VFR BLD AUTO: 40.3 % (ref 37–48.5)
HGB BLD-MCNC: 11.6 G/DL (ref 12–16)
HGB BLD-MCNC: 11.7 G/DL (ref 12–16)
HGB BLD-MCNC: 12 G/DL (ref 12–16)
HGB BLD-MCNC: 12.3 G/DL (ref 12–16)
HGB BLD-MCNC: 12.4 G/DL (ref 12–16)
HGB UR QL STRIP: NEGATIVE
HYALINE CASTS #/AREA URNS LPF: 0 /LPF
IMM GRANULOCYTES # BLD AUTO: 0.02 K/UL (ref 0–0.04)
IMM GRANULOCYTES # BLD AUTO: 0.03 K/UL (ref 0–0.04)
IMM GRANULOCYTES NFR BLD AUTO: 0.2 % (ref 0–0.5)
IMM GRANULOCYTES NFR BLD AUTO: 0.3 % (ref 0–0.5)
IMM GRANULOCYTES NFR BLD AUTO: 0.3 % (ref 0–0.5)
IMM GRANULOCYTES NFR BLD AUTO: 0.4 % (ref 0–0.5)
KETONES UR QL STRIP: NEGATIVE
LEUKOCYTE ESTERASE UR QL STRIP: ABNORMAL
LYMPHOCYTES # BLD AUTO: 2.1 K/UL (ref 1–4.8)
LYMPHOCYTES # BLD AUTO: 2.4 K/UL (ref 1–4.8)
LYMPHOCYTES # BLD AUTO: 2.5 K/UL (ref 1–4.8)
LYMPHOCYTES # BLD AUTO: 2.8 K/UL (ref 1–4.8)
LYMPHOCYTES NFR BLD: 29.3 % (ref 18–48)
LYMPHOCYTES NFR BLD: 30.4 % (ref 18–48)
LYMPHOCYTES NFR BLD: 34.5 % (ref 18–48)
LYMPHOCYTES NFR BLD: 35 % (ref 18–48)
MAGNESIUM SERPL-MCNC: 2.2 MG/DL (ref 1.6–2.6)
MCH RBC QN AUTO: 29.8 PG (ref 27–31)
MCH RBC QN AUTO: 30.1 PG (ref 27–31)
MCH RBC QN AUTO: 30.3 PG (ref 27–31)
MCHC RBC AUTO-ENTMCNC: 30.5 G/DL (ref 32–36)
MCHC RBC AUTO-ENTMCNC: 31.3 G/DL (ref 32–36)
MCHC RBC AUTO-ENTMCNC: 31.6 G/DL (ref 32–36)
MCHC RBC AUTO-ENTMCNC: 31.9 G/DL (ref 32–36)
MCHC RBC AUTO-ENTMCNC: 32 G/DL (ref 32–36)
MCV RBC AUTO: 94 FL (ref 82–98)
MCV RBC AUTO: 95 FL (ref 82–98)
MCV RBC AUTO: 95 FL (ref 82–98)
MCV RBC AUTO: 96 FL (ref 82–98)
MCV RBC AUTO: 98 FL (ref 82–98)
MICROSCOPIC COMMENT: ABNORMAL
MONOCYTES # BLD AUTO: 0.4 K/UL (ref 0.3–1)
MONOCYTES # BLD AUTO: 0.5 K/UL (ref 0.3–1)
MONOCYTES # BLD AUTO: 0.6 K/UL (ref 0.3–1)
MONOCYTES # BLD AUTO: 0.6 K/UL (ref 0.3–1)
MONOCYTES NFR BLD: 5.6 % (ref 4–15)
MONOCYTES NFR BLD: 6.6 % (ref 4–15)
MONOCYTES NFR BLD: 7.3 % (ref 4–15)
MONOCYTES NFR BLD: 8.2 % (ref 4–15)
NEUTROPHILS # BLD AUTO: 3.9 K/UL (ref 1.8–7.7)
NEUTROPHILS # BLD AUTO: 4.6 K/UL (ref 1.8–7.7)
NEUTROPHILS # BLD AUTO: 4.7 K/UL (ref 1.8–7.7)
NEUTROPHILS # BLD AUTO: 4.8 K/UL (ref 1.8–7.7)
NEUTROPHILS NFR BLD: 54.4 % (ref 38–73)
NEUTROPHILS NFR BLD: 57.4 % (ref 38–73)
NEUTROPHILS NFR BLD: 60.1 % (ref 38–73)
NEUTROPHILS NFR BLD: 63.3 % (ref 38–73)
NITRITE UR QL STRIP: NEGATIVE
NRBC BLD-RTO: 0 /100 WBC
PH UR STRIP: 6 [PH] (ref 5–8)
PLATELET # BLD AUTO: 239 K/UL (ref 150–450)
PLATELET # BLD AUTO: 289 K/UL (ref 150–450)
PLATELET # BLD AUTO: 293 K/UL (ref 150–450)
PLATELET # BLD AUTO: 294 K/UL (ref 150–450)
PLATELET # BLD AUTO: 298 K/UL (ref 150–450)
PMV BLD AUTO: 10.5 FL (ref 9.2–12.9)
PMV BLD AUTO: 9.6 FL (ref 9.2–12.9)
PMV BLD AUTO: 9.7 FL (ref 9.2–12.9)
PMV BLD AUTO: 9.8 FL (ref 9.2–12.9)
PMV BLD AUTO: 9.8 FL (ref 9.2–12.9)
POTASSIUM SERPL-SCNC: 3.8 MMOL/L (ref 3.5–5.1)
PROT SERPL-MCNC: 6.6 G/DL (ref 6–8.4)
PROT UR QL STRIP: NEGATIVE
RBC # BLD AUTO: 3.85 M/UL (ref 4–5.4)
RBC # BLD AUTO: 3.86 M/UL (ref 4–5.4)
RBC # BLD AUTO: 3.99 M/UL (ref 4–5.4)
RBC # BLD AUTO: 4.12 M/UL (ref 4–5.4)
RBC # BLD AUTO: 4.13 M/UL (ref 4–5.4)
RBC #/AREA URNS HPF: 1 /HPF (ref 0–4)
SODIUM SERPL-SCNC: 136 MMOL/L (ref 136–145)
SP GR UR STRIP: 1 (ref 1–1.03)
SQUAMOUS #/AREA URNS HPF: 2 /HPF
URN SPEC COLLECT METH UR: ABNORMAL
UROBILINOGEN UR STRIP-ACNC: NEGATIVE EU/DL
WBC # BLD AUTO: 6.93 K/UL (ref 3.9–12.7)
WBC # BLD AUTO: 7.18 K/UL (ref 3.9–12.7)
WBC # BLD AUTO: 7.19 K/UL (ref 3.9–12.7)
WBC # BLD AUTO: 7.94 K/UL (ref 3.9–12.7)
WBC # BLD AUTO: 8.23 K/UL (ref 3.9–12.7)
WBC #/AREA URNS HPF: 17 /HPF (ref 0–5)

## 2022-10-05 PROCEDURE — 96376 TX/PRO/DX INJ SAME DRUG ADON: CPT

## 2022-10-05 PROCEDURE — 25000003 PHARM REV CODE 250: Mod: GZ | Performed by: HOSPITALIST

## 2022-10-05 PROCEDURE — 99900035 HC TECH TIME PER 15 MIN (STAT)

## 2022-10-05 PROCEDURE — 81001 URINALYSIS AUTO W/SCOPE: CPT | Performed by: FAMILY MEDICINE

## 2022-10-05 PROCEDURE — 36415 COLL VENOUS BLD VENIPUNCTURE: CPT | Performed by: FAMILY MEDICINE

## 2022-10-05 PROCEDURE — 96361 HYDRATE IV INFUSION ADD-ON: CPT

## 2022-10-05 PROCEDURE — 83735 ASSAY OF MAGNESIUM: CPT | Performed by: FAMILY MEDICINE

## 2022-10-05 PROCEDURE — 85025 COMPLETE CBC W/AUTO DIFF WBC: CPT | Mod: 91 | Performed by: FAMILY MEDICINE

## 2022-10-05 PROCEDURE — 30000890 LABCORP MISCELLANEOUS TEST: Performed by: FAMILY MEDICINE

## 2022-10-05 PROCEDURE — 87186 SC STD MICRODIL/AGAR DIL: CPT | Performed by: FAMILY MEDICINE

## 2022-10-05 PROCEDURE — 25000003 PHARM REV CODE 250: Performed by: FAMILY MEDICINE

## 2022-10-05 PROCEDURE — 94761 N-INVAS EAR/PLS OXIMETRY MLT: CPT

## 2022-10-05 PROCEDURE — 87077 CULTURE AEROBIC IDENTIFY: CPT | Performed by: FAMILY MEDICINE

## 2022-10-05 PROCEDURE — G0378 HOSPITAL OBSERVATION PER HR: HCPCS | Mod: CS

## 2022-10-05 PROCEDURE — 85027 COMPLETE CBC AUTOMATED: CPT | Mod: 91 | Performed by: HOSPITALIST

## 2022-10-05 PROCEDURE — 36415 COLL VENOUS BLD VENIPUNCTURE: CPT | Performed by: HOSPITALIST

## 2022-10-05 PROCEDURE — 80053 COMPREHEN METABOLIC PANEL: CPT | Performed by: FAMILY MEDICINE

## 2022-10-05 PROCEDURE — 96372 THER/PROPH/DIAG INJ SC/IM: CPT | Performed by: HOSPITALIST

## 2022-10-05 PROCEDURE — 83036 HEMOGLOBIN GLYCOSYLATED A1C: CPT | Performed by: FAMILY MEDICINE

## 2022-10-05 PROCEDURE — 99900031 HC PATIENT EDUCATION (STAT)

## 2022-10-05 PROCEDURE — 83036 HEMOGLOBIN GLYCOSYLATED A1C: CPT | Mod: 59 | Performed by: FAMILY MEDICINE

## 2022-10-05 PROCEDURE — 63600175 PHARM REV CODE 636 W HCPCS: Performed by: FAMILY MEDICINE

## 2022-10-05 PROCEDURE — C9113 INJ PANTOPRAZOLE SODIUM, VIA: HCPCS | Performed by: FAMILY MEDICINE

## 2022-10-05 PROCEDURE — 87086 URINE CULTURE/COLONY COUNT: CPT | Performed by: FAMILY MEDICINE

## 2022-10-05 RX ORDER — LANOLIN ALCOHOL/MO/W.PET/CERES
800 CREAM (GRAM) TOPICAL
Status: DISCONTINUED | OUTPATIENT
Start: 2022-10-05 | End: 2022-10-06 | Stop reason: HOSPADM

## 2022-10-05 RX ORDER — SODIUM,POTASSIUM PHOSPHATES 280-250MG
2 POWDER IN PACKET (EA) ORAL
Status: DISCONTINUED | OUTPATIENT
Start: 2022-10-05 | End: 2022-10-06 | Stop reason: HOSPADM

## 2022-10-05 RX ORDER — PANTOPRAZOLE SODIUM 40 MG/10ML
80 INJECTION, POWDER, LYOPHILIZED, FOR SOLUTION INTRAVENOUS 2 TIMES DAILY
Status: DISCONTINUED | OUTPATIENT
Start: 2022-10-05 | End: 2022-10-06

## 2022-10-05 RX ORDER — SODIUM CHLORIDE 9 MG/ML
INJECTION, SOLUTION INTRAVENOUS CONTINUOUS
Status: DISCONTINUED | OUTPATIENT
Start: 2022-10-05 | End: 2022-10-06

## 2022-10-05 RX ADMIN — PANTOPRAZOLE SODIUM 80 MG: 40 INJECTION, POWDER, FOR SOLUTION INTRAVENOUS at 09:10

## 2022-10-05 RX ADMIN — BUSPIRONE HYDROCHLORIDE 5 MG: 5 TABLET ORAL at 08:10

## 2022-10-05 RX ADMIN — ACETAMINOPHEN 650 MG: 325 TABLET ORAL at 09:10

## 2022-10-05 RX ADMIN — BUSPIRONE HYDROCHLORIDE 2.5 MG: 5 TABLET ORAL at 09:10

## 2022-10-05 RX ADMIN — SODIUM CHLORIDE: 0.9 INJECTION, SOLUTION INTRAVENOUS at 11:10

## 2022-10-05 RX ADMIN — INSULIN DETEMIR 5 UNITS: 100 INJECTION, SOLUTION SUBCUTANEOUS at 08:10

## 2022-10-05 RX ADMIN — DILTIAZEM HYDROCHLORIDE 120 MG: 120 CAPSULE, COATED, EXTENDED RELEASE ORAL at 08:10

## 2022-10-05 RX ADMIN — BUSPIRONE HYDROCHLORIDE 5 MG: 5 TABLET ORAL at 11:10

## 2022-10-05 RX ADMIN — PANTOPRAZOLE SODIUM 80 MG: 40 INJECTION, POWDER, FOR SOLUTION INTRAVENOUS at 11:10

## 2022-10-05 NOTE — PLAN OF CARE
Met with patient and spouse at bedside to complete initial assessment.    [facility]  Initial Discharge Assessment       Primary Care Provider: Prabhu Clark MD    Admission Diagnosis: GI bleed [K92.2]    Admission Date: 10/4/2022  Expected Discharge Date:     Discharge Barriers Identified: (P) None    Payor: HU[employer] MANAGED MEDICARE / Plan: HU[employer] MEDICARE PPO / Product Type: Medicare Advantage /     Extended Emergency Contact Information  Primary Emergency Contact: Isak Alas  Address: [address] [place] [address] [place] [address] [facility]  Home Phone: [phone]  Mobile Phone: [phone]  Relation: Spouse  Preferred language: English   needed? No  Secondary Emergency Contact: Sharlene De  Mobile Phone: [phone]  Relation: Daughter    Discharge Plan A: (P) Home with family  Discharge Plan B: (P) Home with family      [id number] - [facility] [address]  AT [facility]  [address] [facility] [address]  Phone: [phone] Fax: [phone]    [facility] - [facility] [address] [facility]  [address] [facility] [address]  Phone: [phone] Fax: [phone]      Initial Assessment (most recent)       Adult Discharge Assessment - 10/05/22 0944          Discharge Assessment    Assessment Type Discharge Planning Assessment (P)      Confirmed/corrected address, phone number and insurance Yes (P)      Confirmed Demographics Correct on Facesheet (P)      Source of Information patient;family (P)      When was your last doctors appointment? 09/21/22 (P)      Does patient/caregiver understand observation status Yes (P)      Communicated TON with patient/caregiver No (P)      Reason For Admission GI bleed (P)      Lives With spouse (P)      Facility Arrived From: home (P)      Do you expect to return to your current living situation? Yes (P)      Do you have help at home or someone to help you  manage your care at home? Yes (P)      Who are your caregiver(s) and their phone number(s)? Evette,Isak (Spouse)   [phone] (Home Phone) (P)      Prior to hospitilization cognitive status: Unable to Assess (P)      Current cognitive status: Alert/Oriented (P)    hard of hearing    Walking or Climbing Stairs Difficulty ambulation difficulty, requires equipment;ambulation difficulty, assistance 1 person;transferring difficulty, requires equipment;transferring difficulty, assistance 1 person (P)      Mobility Management wheelchair, maegan lift (P)      Dressing/Bathing Difficulty bathing difficulty, assistance 1 person;dressing difficulty, assistance 1 person (P)      Dressing/Bathing Management spouse assists (P)      Equipment Currently Used at Home wheelchair;rollator;other (see comments) (P)    maegan lift    Readmission within 30 days? No (P)      Patient currently being followed by outpatient case management? No (P)      Do you currently have service(s) that help you manage your care at home? No (P)      Do you take prescription medications? Yes (P)      Do you have prescription coverage? Yes (P)      Coverage [employer] (P)      Do you have any problems affording any of your prescribed medications? No (P)      Is the patient taking medications as prescribed? yes (P)      Who is going to help you get home at discharge? EvetteIsak hernandez (Spouse)   [phone] (Home Phone) (P)      How do you get to doctors appointments? health plan transportation (P)    [facility] transport    Are you on dialysis? No (P)      Do you take coumadin? No (P)      Discharge Plan A Home with family (P)      Discharge Plan B Home with family (P)      DME Needed Upon Discharge  none (P)      Discharge Plan discussed with: Spouse/sig other (P)      Name(s) and Number(s) Isak Alas (Spouse)   [phone] (Home Phone) (P)      Discharge Barriers Identified None (P)         Relationship/Environment    Name(s) of Who Lives With Patient Isak Alas (Spouse)    [phone] (Home Phone) (P)

## 2022-10-05 NOTE — PLAN OF CARE
10/05/22 1041   Patient Assessment/Suction   Level of Consciousness (AVPU) alert   Respiratory Effort Normal;Unlabored   All Lung Fields Breath Sounds clear   PRE-TX-O2   O2 Device (Oxygen Therapy) room air   SpO2 98 %   Pulse Oximetry Type Intermittent   $ Pulse Oximetry - Multiple Charge Pulse Oximetry - Multiple   Pulse 70   Resp 18   Aerosol Therapy   $ Aerosol Therapy Charges PRN treatment not required   Respiratory Treatment Status (SVN) PRN treatment not required   Preset CPAP/BiPAP Settings   $ Is patient using? No/refused   Education   $ Education Bronchodilator;15 min   Respiratory Evaluation   $ Care Plan Tech Time 15 min

## 2022-10-05 NOTE — NURSING
Patient arrived to floor via stretcher, alert and oriented x4, no distress noted. Patient's family at bedside. Vitals stable and incontinence care provided.

## 2022-10-05 NOTE — H&P
"[facility] Medicine   History & Physical   Patient Name: Radha Alas  MRN: [id number]  Admission Date: 10/4/2022 12:57 PM  Attending Physician: Katie Frederick MD  Primary Care Provider: Prabhu Clark MD  Face-to-Face encounter date: 10/04/2022    Patient information was obtained from patient, past medical records, ER physician, and ER records.     HISTORY OF PRESENT ILLNESS:     Radha Alas is a 82 y.o. Black or  female   With PMH of pAF on eliquis, previous DVT,   PUD, HTN, DM2, Parkinson's,  who presents with GI bleeding.    Onset this AM  Noted large amount of blood in diaper this AM  Described as maroon-colored with loose stool  Maroon stool continues in ER today  No dizziness  No CP  No SOB  No n/v  No diarrhea  No abdominal pain    Remote hx of colonoscopy "years ago"  At that time, "colon was nicked"  This did not happen recently    REVIEW OF SYSTEMS:     All systems reviewed and are negative except as noted per above.    PAST MEDICAL HISTORY:     Past Medical History:   Diagnosis Date    Anemia     Atrial fibrillation     Chronic constipation     Deep vein thrombosis of left lower extremity     Diabetes mellitus     TYPE 2    Diverticulosis     Hypertension     Obesity     Parkinson disease     Peptic ulcer of stomach     Prolapse of female bladder, acquired     Rectocele     Uterine cancer 11-1-2011    STAGE 1-A GRADE 2       PAST SURGICAL HISTORY:     Past Surgical History:   Procedure Laterality Date    COLONOSCOPY  prior to 2000    COLONOSCOPY N/A 9/28/2016    Procedure: COLONOSCOPY;  Surgeon: Baljinder Ospina MD;  Location: [facility];  Service: Endoscopy;  Laterality: N/A;    FLEXIBLE SIGMOIDOSCOPY  06/05/2016    at [facility]- in media section: poor prep, diverticulosis noted with old clots, internal hemorrhoids otherwise normal findings- recommend outpatient colonoscopy    FOREARM SURGERY      right forearm surgery    HERNIA REPAIR      ROBOTIC ASST    " HYSTERECTOMY  11/1/2001    TLH/BSO--cancer    PELVIC AND PARA-AORTIC LYMPH NODE DISSECTION      VA REMOVAL OF OVARY/TUBE(S)      ROBOTIC ASST    TUBAL LIGATION      UPPER GASTROINTESTINAL ENDOSCOPY         ALLERGIES:   Glucophage  [metformin] and Sulfa (sulfonamide antibiotics)    FAMILY HISTORY:     Family History   Problem Relation Age of Onset    Heart disease Mother     Diabetes Mother     Hypertension Father     Heart disease Sister     Diabetes Maternal Grandmother     Hypertension Maternal Grandmother     No Known Problems Brother     No Known Problems Daughter     No Known Problems Son     No Known Problems Maternal Aunt     No Known Problems Maternal Uncle     No Known Problems Paternal Aunt     No Known Problems Paternal Uncle     No Known Problems Maternal Grandfather     No Known Problems Paternal Grandmother     No Known Problems Paternal Grandfather     Breast cancer Neg Hx     Ovarian cancer Neg Hx     Uterine cancer Neg Hx     Colon cancer Neg Hx     Cancer Neg Hx     Cervical cancer Neg Hx     Endometrial cancer Neg Hx     Vaginal cancer Neg Hx     Colon polyps Neg Hx     Crohn's disease Neg Hx     Ulcerative colitis Neg Hx        SOCIAL HISTORY:     Social History     Tobacco Use    Smoking status: Never    Smokeless tobacco: Never   Substance Use Topics    Alcohol use: No     Alcohol/week: 0.0 standard drinks        Social History     Substance and Sexual Activity   Sexual Activity Not Currently        HOME MEDICATIONS:     Prior to Admission medications    Medication Sig Start Date End Date Taking? Authorizing Provider   busPIRone (BUSPAR) 7.5 MG tablet Take 1 tablet (7.5 mg total) by mouth 3 (three) times daily. 6/22/22 6/22/23 Yes Prabhu Clark MD   diltiaZEM (CARTIA XT) 120 MG Cp24 Take 1 capsule (120 mg total) by mouth every evening. 4/4/22  Yes Prabhu Clark MD   ELIQUIS 5 mg Tab TK 1 T PO BID  Patient taking differently: Take 5 mg by mouth 2 (two) times daily. TK 1 T PO BID 3/31/22   "Yes Prabhu Clark MD   glimepiride (AMARYL) 4 MG tablet Take 1 tablet (4 mg total) by mouth before breakfast. 2/7/22  Yes Desi Salguero MD   LEVEMIR FLEXTOUCH U-100 INSULN 100 unit/mL (3 mL) InPn pen INJECT 20 UNITS INTO THE SKIN EVERY EVENING. 9/8/22 2/5/23 Yes Prabhu Clark MD   lisinopriL (PRINIVIL,ZESTRIL) 20 MG tablet 1 TABLET ONCE DAILY  Patient taking differently: Take 20 mg by mouth once daily. 1 TABLET ONCE DAILY 7/5/22  Yes Prabhu Clark MD   selegiline (ELDEPRYL) 5 mg Cap Take 1 capsule (5 mg total) by mouth 2 (two) times daily. 4/29/22 4/29/23 Yes Prabhu Clark MD   ACETAMINOPHEN (TYLENOL ARTHRITIS ORAL) Take by mouth.    Historical Provider   DROPLET PEN NEEDLE 31 gauge x 3/16" Ndle USE  TO  ADMINISTER  INSULIN ONE TIME DAILY 1/12/22   Desi Salguero MD   omeprazole (PRILOSEC) 40 MG capsule Take 1 capsule (40 mg total) by mouth once daily. 1/21/21 9/19/22  Shahnaz Iqbal, MELISSA   polyethylene glycol (GLYCOLAX) 17 gram/dose powder Take 17 g by mouth daily as needed. 5/13/20   Historical Provider   TRUE METRIX GLUCOSE TEST STRIP Strp Test glucose levels 2-3 daily for diabetes 8/30/22   Prabhu Clark MD   donepeziL (ARICEPT) 5 MG tablet Take 1 tablet (5 mg total) by mouth every evening.  Patient not taking: No sig reported 6/6/22 10/4/22  Cierra Rodriguez MD       PHYSICAL EXAM:     BP (!) 110/58   Pulse 94   Temp 97.9 °F (36.6 °C) (Oral)   Resp 18   Ht 5' 6" (1.676 m)   Wt 76.2 kg (168 lb)   SpO2 97%   BMI 27.12 kg/m²     Gen: alert, responsive  HEENT:  Eyes - no pallor  External ears with no lesions  Nares patent  Mouth - lips chapped  CV: RRR  Lungs: CTA B/L  Abd: +BS, soft, NT on my exam, ND   Ext: no atrophy or edema  Skin: warm, dry  Neuro: alert, +chronic tremors  Psych: pleasant     LABS AND IMAGING:     Labs Reviewed   COMPREHENSIVE METABOLIC PANEL - Abnormal; Notable for the following components:       Result Value    Glucose 151 (*)     Albumin 3.2 (*)     Anion " Gap 5 (*)     All other components within normal limits    Narrative:     Recoll. [id number] by [email or web address] at 10/04/2022 15:56, reason: grossly   hemolyzed. recollect called to [facility]   COMPREHENSIVE METABOLIC PANEL - Abnormal; Notable for the following components:    Glucose 151 (*)     Albumin 3.2 (*)     Anion Gap 5 (*)     All other components within normal limits    Narrative:     Recoll. [id number] by [email or web address] at 10/04/2022 15:56, reason: grossly   hemolyzed. recollect called to [facility]   PROTIME-INR - Abnormal; Notable for the following components:    PT 16.0 (*)     All other components within normal limits    Narrative:     Recoll. [id number] by [email or web address] at 10/04/2022 15:58, reason: grossly   hemolyzed. recollect called to [facility]   CBC W/ AUTO DIFFERENTIAL   APTT    Narrative:     Recoll. [id number] by [email or web address] at 10/04/2022 15:58, reason: grossly   hemolyzed. recollect called to [facility]   LIPASE    Narrative:     Recoll. [id number] by [email or web address] at 10/04/2022 15:56, reason: grossly   hemolyzed. recollect called to [facility]   CK    Narrative:     Recoll. [id number] by [email or web address] at 10/04/2022 15:56, reason: grossly   hemolyzed. recollect called to [facility]   B-TYPE NATRIURETIC PEPTIDE   MAGNESIUM    Narrative:     Recoll. [id number] by [email or web address] at 10/04/2022 15:56, reason: grossly   hemolyzed. recollect called to [facility]   TROPONIN I    Narrative:     Recoll. [id number] by [email or web address] at 10/04/2022 15:56, reason: grossly   hemolyzed. recollect called to [facility]   TSH    Narrative:     Recoll. [id number] by [email or web address] at 10/04/2022 15:56, reason: grossly   hemolyzed. recollect called to [facility]   SARS-COV-2 RNA AMPLIFICATION, QUAL   URINALYSIS, REFLEX TO URINE CULTURE   TYPE & SCREEN     Imaging Results              X-Ray Chest 1 View (Final result)  Result time 10/04/22 14:45:47      Final result by Danyel [facility] MD (10/04/22 14:45:47)                   Narrative:    XR  CHEST 1 VIEW    CLINICAL HISTORY:  82 years Female GI bleed    COMPARISON: None    FINDINGS: Cardiac silhouette size is borderline enlarged for portable technique. No confluent airspace disease. No large pleural effusion or pneumothorax. No acute osseous abnormality.    IMPRESSION: No acute pulmonary process.    Electronically signed by:  Danyel León MD  10/4/2022 2:45 PM CDT Workstation: [id number]                                    ASSESSMENT & PLAN:   Radha Alas is a 82 y.o. female admitted for    Active Hospital Problems    Diagnosis  POA    *GI bleed [K92.2]  Unknown    GERD without esophagitis [K21.9]  Yes    Type 2 diabetes mellitus with diabetic neuropathy, with long-term current use of insulin [E11.40, Z79.4]  Not Applicable    Chronic atrial fibrillation [I48.20]  Yes     Established with Dr. Mueller, on eliquis      Parkinson disease [G20]  Yes    Chronic constipation [K59.09]  Yes    History of endometrial cancer [Z85.42]  Not Applicable      Resolved Hospital Problems   No resolved problems to display.        Plan    GI bleeding  Complicated by PUD  Complicated by pAF on eliquis  Complicated by previous DVT  - protonix  - trending CBC  - hold eliquis; continue home diltiazem   - GI consulted, thank you    Chronic conditions as noted above/below; home medications reviewed personally by me and restarted as appropriate  Electrolyte derangement:  Trending BMP; Mg; replacement prn  DVT ppx: SCDs  FULL CODE    Katie Frederick MD  [facility]  10/04/2022

## 2022-10-05 NOTE — CONSULTS
GASTROENTEROLOGY INPATIENT CONSULT NOTE  Patient Name: Radha Alas  Patient MRN: [id number]  Patient : 1940    Admit Date: 10/4/2022  Service date: 10/5/2022    Reason for Consult: maroon stools    PCP: Prabhu Clark MD    Chief Complaint   Patient presents with    Rectal Bleeding     New onset of dark red blood in stool. Pt transported via EMS. Family on scene states that she had a colonoscopy a few years ago and that they nicked her colon. Pt has had gi issues since. Pt has no complaints at this time.         HPI: Patient is a 82 y.o. female with PMHx HTN, Afib / DVT (Eliquis), DM, parkinson's, uterine Ca s/p hysterectomy, DM, diverticulosis presents for maroon stools. Acute onset, constant, progressive over past 1-2days. No abd pain, NSAID use or other concerrns.      CHART REVIEW:   CT Abd 20 - splenic flexure colitis w/ mild stool distally; Mild GB distension; Normal PD; Borderline PD dilation w/ otherwise normal pancreas  Colon '16 - rectal prolapse; internal roids; small polpys; multiple tics; Normal TI.     Past Medical History:  Past Medical History:   Diagnosis Date    Anemia     Atrial fibrillation     Chronic constipation     Deep vein thrombosis of left lower extremity     Diabetes mellitus     TYPE 2    Diverticulosis     Hypertension     Obesity     Parkinson disease     Peptic ulcer of stomach     Prolapse of female bladder, acquired     Rectocele     Uterine cancer 2011    STAGE 1-A GRADE 2        Past Surgical History:  Past Surgical History:   Procedure Laterality Date    COLONOSCOPY  prior to     COLONOSCOPY N/A 2016    Procedure: COLONOSCOPY;  Surgeon: Baljinder Ospina MD;  Location: [facility];  Service: Endoscopy;  Laterality: N/A;    FLEXIBLE SIGMOIDOSCOPY  2016    at [facility]- in media section: poor prep, diverticulosis noted with old clots, internal hemorrhoids otherwise normal findings- recommend outpatient colonoscopy    FOREARM SURGERY      right forearm  "surgery    HERNIA REPAIR      ROBOTIC ASST    HYSTERECTOMY  11/1/2001    TLH/BSO--cancer    PELVIC AND PARA-AORTIC LYMPH NODE DISSECTION      MN REMOVAL OF OVARY/TUBE(S)      ROBOTIC ASST    TUBAL LIGATION      UPPER GASTROINTESTINAL ENDOSCOPY          Home Medications:  Medications Prior to Admission   Medication Sig Dispense Refill Last Dose    busPIRone (BUSPAR) 7.5 MG tablet Take 1 tablet (7.5 mg total) by mouth 3 (three) times daily. 90 tablet 11 Past Month    diltiaZEM (CARTIA XT) 120 MG Cp24 Take 1 capsule (120 mg total) by mouth every evening. 90 capsule 3 10/3/2022 at 21:00    ELIQUIS 5 mg Tab TK 1 T PO BID (Patient taking differently: Take 5 mg by mouth 2 (two) times daily. TK 1 T PO BID) 180 tablet 3 10/4/2022 at 11:25    glimepiride (AMARYL) 4 MG tablet Take 1 tablet (4 mg total) by mouth before breakfast. 90 tablet 3 10/4/2022 at 11:25    LEVEMIR FLEXTOUCH U-100 INSULN 100 unit/mL (3 mL) InPn pen INJECT 20 UNITS INTO THE SKIN EVERY EVENING. 30 mL 0 10/3/2022 at 18:00    lisinopriL (PRINIVIL,ZESTRIL) 20 MG tablet 1 TABLET ONCE DAILY (Patient taking differently: Take 20 mg by mouth once daily. 1 TABLET ONCE DAILY) 90 tablet 3 10/4/2022 at 11:25    selegiline (ELDEPRYL) 5 mg Cap Take 1 capsule (5 mg total) by mouth 2 (two) times daily. 60 capsule 11 10/4/2022 at 11:25    ACETAMINOPHEN (TYLENOL ARTHRITIS ORAL) Take by mouth.       DROPLET PEN NEEDLE 31 gauge x 3/16" Ndle USE  TO  ADMINISTER  INSULIN ONE TIME DAILY 100 each 11     omeprazole (PRILOSEC) 40 MG capsule Take 1 capsule (40 mg total) by mouth once daily. 90 capsule 3     polyethylene glycol (GLYCOLAX) 17 gram/dose powder Take 17 g by mouth daily as needed.       TRUE METRIX GLUCOSE TEST STRIP Strp Test glucose levels 2-3 daily for diabetes 200 strip 3        Inpatient Medications:   busPIRone  5 mg Oral BID    And    busPIRone  2.5 mg Oral TID    diltiaZEM  120 mg Oral QHS    insulin detemir U-100  10 Units Subcutaneous QHS    pantoprazole  80 mg " Intravenous BID     acetaminophen, albuterol-ipratropium, dextrose 50%, dextrose 50%, glucagon (human recombinant), glucose, glucose, hydrALAZINE, hydrALAZINE, HYDROcodone-acetaminophen, insulin aspart U-100, magnesium oxide, magnesium oxide, melatonin, morphine, naloxone, ondansetron, polyethylene glycol, potassium bicarbonate, potassium bicarbonate, potassium bicarbonate, potassium, sodium phosphates, potassium, sodium phosphates, potassium, sodium phosphates, senna-docusate 8.6-50 mg, simethicone, sodium chloride 0.9%    Review of patient's allergies indicates:   Allergen Reactions    Glucophage  [metformin]      Other reaction(s): Diarrhea  Other reaction(s): Diarrhea    Sulfa (sulfonamide antibiotics) Itching     Other reaction(s): Itching  Other reaction(s): Itching       Social History:   Social History     Occupational History    Occupation: retired teacher   Tobacco Use    Smoking status: Never    Smokeless tobacco: Never   Substance and Sexual Activity    Alcohol use: No     Alcohol/week: 0.0 standard drinks    Drug use: No    Sexual activity: Not Currently       Family History:   Family History   Problem Relation Age of Onset    Heart disease Mother     Diabetes Mother     Hypertension Father     Heart disease Sister     Diabetes Maternal Grandmother     Hypertension Maternal Grandmother     No Known Problems Brother     No Known Problems Daughter     No Known Problems Son     No Known Problems Maternal Aunt     No Known Problems Maternal Uncle     No Known Problems Paternal Aunt     No Known Problems Paternal Uncle     No Known Problems Maternal Grandfather     No Known Problems Paternal Grandmother     No Known Problems Paternal Grandfather     Breast cancer Neg Hx     Ovarian cancer Neg Hx     Uterine cancer Neg Hx     Colon cancer Neg Hx     Cancer Neg Hx     Cervical cancer Neg Hx     Endometrial cancer Neg Hx     Vaginal cancer Neg Hx     Colon polyps Neg Hx     Crohn's disease Neg Hx     Ulcerative  "colitis Neg Hx        Review of Systems:  A 10 point review of systems was performed and was normal, except as mentioned in the HPI, including constitutional, HEENT, heme, lymph, cardiovascular, respiratory, gastrointestinal, genitourinary, neurologic, endocrine, psychiatric and musculoskeletal.      OBJECTIVE:    Physical Exam:  24 Hour Vital Sign Ranges: Temp:  [97.7 °F (36.5 °C)-98.6 °F (37 °C)] 98.6 °F (37 °C)  Pulse:  [] 72  Resp:  [18] 18  SpO2:  [92 %-100 %] 98 %  BP: (110-168)/(58-93) 133/66  Most recent vitals: /66   Pulse 72   Temp 98.6 °F (37 °C)   Resp 18   Ht 5' 8" (1.727 m)   Wt 97.4 kg (214 lb 11.7 oz)   SpO2 98%   BMI 32.65 kg/m²    GEN: chroni ill appearing elderly BF  HEENT: PERRL, sclera anicteric, oral mucosa pink and moist without lesion  NECK: trachea midline; Good ROM  CV: regular rate and rhythm, no murmurs or gallops  RESP: clear to auscultation bilaterally, no wheezes, rhonci or rales  ABD: soft, non-tender, non-distended, normal bowel sounds  EXT: no swelling or edema, 1+ pulses distally; slight involuntary movements  SKIN: no rashes or jaundice  PSYCH: normal affect    Labs:   Recent Labs     10/05/22  0531 10/05/22  1305 10/05/22  1548   WBC 7.94 7.19 6.93   MCV 95 98 94    239 289     Recent Labs     10/04/22  1600 10/05/22  0128     137 136   K 4.2  4.2 3.8     103 103   CO2 29  29 26   BUN 15  15 15   *  151* 126*     No results for input(s): ALB in the last 72 hours.    Invalid input(s): ALKP, SGOT, SGPT, TBIL, DBIL, TPRO  Recent Labs     10/04/22  1600   INR 1.4         Radiology Review:  X-Ray Chest 1 View   Final Result            IMPRESSION / RECOMMENDATIONS:  82 y.o. female with PMHx HTN, Afib / DVT (Eliquis), DM, parkinson's, uterine Ca s/p hysterectomy, DM, diverticulosis presents for maroon stools.  RIsks, benefits, alternatives discussed in detail w/ patient /  regarding upcoming procedures and sedation and possible " complications. At this point, they do not want any further w/u and delay in dx / tx discussed in detail. Fortunately Hg relative stable but can expect some mild decline    -Conservative for now per patient /  wishes  -Daily CBD  -If any concerns for ongoing GIB and endoscopy desired, please re-consult  -Weigh risks / benefits of Eliquis      Thank you for this consult.    Jair BACON Datimive III  10/5/2022  5:41 PM

## 2022-10-05 NOTE — PROGRESS NOTES
[facility] Medicine  Progress Note    Patient name: Radha Alas  MRN: [id number]  Admit Date: 10/4/2022   LOS: 0 days     SUBJECTIVE:     Principal problem: GI bleed    Interval History:  Patient was seen and examined bedside, denies any symptoms, CBC stable.  Patient and  was her reluctant for colonoscopy.  No other acute events overnight had been nursing staff.     Scheduled Meds:   busPIRone  5 mg Oral BID    And    busPIRone  2.5 mg Oral TID    diltiaZEM  120 mg Oral QHS    pantoprazole  80 mg Intravenous BID     Continuous Infusions:  PRN Meds:acetaminophen, albuterol-ipratropium, dextrose 50%, dextrose 50%, glucagon (human recombinant), glucose, glucose, hydrALAZINE, hydrALAZINE, HYDROcodone-acetaminophen, insulin aspart U-100, magnesium oxide, magnesium oxide, melatonin, morphine, naloxone, ondansetron, polyethylene glycol, potassium bicarbonate, potassium bicarbonate, potassium bicarbonate, potassium, sodium phosphates, potassium, sodium phosphates, potassium, sodium phosphates, senna-docusate 8.6-50 mg, simethicone, sodium chloride 0.9%    Review of patient's allergies indicates:   Allergen Reactions    Glucophage  [metformin]      Other reaction(s): Diarrhea  Other reaction(s): Diarrhea    Sulfa (sulfonamide antibiotics) Itching     Other reaction(s): Itching  Other reaction(s): Itching       Review of Systems: As per interval history    OBJECTIVE:     Vital Signs (Most Recent)  Temp: 97.7 °F (36.5 °C) (10/05/22 0442)  Pulse: 71 (10/05/22 0442)  Resp: 18 (10/05/22 0442)  BP: (!) 145/75 (10/05/22 0442)  SpO2: 100 % (10/05/22 0442)    Vital Signs Range (Last 24H):  Temp:  [97.7 °F (36.5 °C)-98.2 °F (36.8 °C)]   Pulse:  []   Resp:  [18]   BP: (110-243)/()   SpO2:  [92 %-100 %]     I & O (Last 24H):  Intake/Output Summary (Last 24 hours) at 10/5/2022 0900  Last data filed at 10/5/2022 0533  Gross per 24 hour   Intake 240 ml   Output --   Net 240 ml       Physical  Exam:  General: Patient resting comfortably in no acute distress. Appears as stated age. Calm  Eyes: No conjunctival injection. No scleral icterus.  ENT: Hearing grossly intact. No discharge from ears. No nasal discharge.   Neck: Supple, trachea midline. No JVD  CVS: RRR. No LE edema BL  Lungs:  No tachypnea or accessory muscle use.  Clear to auscultation bilaterally  Abdomen:  Soft, nontender and nondistended.  No organomegaly  Neuro: AOx3. Moves all extremities. Follows commands. Responds appropriately, tremors noted  Skin:  No rash or erythema noted    Laboratory:  I have reviewed all pertinent lab results within the past 24 hours.    Diagnostic Results:  Reviewed all imaging    ASSESSMENT/PLAN:     82-year-old lady with prior history of AFib on Eliquis, previous DVT, peptic ulcer disease, hypertension, Parkinson's presented with GI bleed    Active Hospital Problems    Diagnosis  POA    *GI bleed [K92.2]  Unknown    GERD without esophagitis [K21.9]  Yes    Type 2 diabetes mellitus with diabetic neuropathy, with long-term current use of insulin [E11.40, Z79.4]  Not Applicable    Chronic atrial fibrillation [I48.20]  Yes     Established with Dr. Mueller, on eliquis      Parkinson disease [G20]  Yes    Chronic constipation [K59.09]  Yes    History of endometrial cancer [Z85.42]  Not Applicable      Resolved Hospital Problems   No resolved problems to display.         Plan:   So far CBC stable, bleeding appears to have stopped, continue holding Eliquis  PPI  Has prior history of peptic ulcer disease  Consultation GI-patient and  both elevated again to try colonoscopy  Hold Eliquis  Serial CBCs  Full liquid diet  If bleeding recurs or substantial drop in hemoglobin we may consider endoscopic evaluation tomorrow AM.  Updated  at bedside      VTE Risk Mitigation (From admission, onward)           Ordered     IP VTE HIGH RISK PATIENT  Once         10/04/22 2057     Place sequential compression device   Until discontinued         10/04/22 2057                        Department Hospital Medicine  [facility]  Karma Naranjo MD  Date of service: 10/05/2022

## 2022-10-05 NOTE — PLAN OF CARE
Met with patient at bedside, explained Medicare Outpatient Observation Notice (MOON), and left Q&A information sheet at bedside. MOON form scanned into media manager.       10/05/22 0942   MACHUCA Message   Medicare Outpatient and Observation Notification regarding financial responsibility Given to patient/caregiver;Explained to patient/caregiver;Signed/date by patient/caregiver   Date MACHUCA was signed 10/05/22   Time MACHUCA was signed 2100

## 2022-10-06 VITALS
OXYGEN SATURATION: 97 % | HEART RATE: 81 BPM | RESPIRATION RATE: 24 BRPM | SYSTOLIC BLOOD PRESSURE: 134 MMHG | HEIGHT: 68 IN | DIASTOLIC BLOOD PRESSURE: 64 MMHG | TEMPERATURE: 98 F | BODY MASS INDEX: 32.55 KG/M2 | WEIGHT: 214.75 LBS

## 2022-10-06 PROBLEM — N39.0 UTI (URINARY TRACT INFECTION): Status: ACTIVE | Noted: 2022-10-06

## 2022-10-06 LAB
ALBUMIN SERPL BCP-MCNC: 3.1 G/DL (ref 3.5–5.2)
ALP SERPL-CCNC: 57 U/L (ref 55–135)
ALT SERPL W/O P-5'-P-CCNC: 14 U/L (ref 10–44)
ANION GAP SERPL CALC-SCNC: 4 MMOL/L (ref 8–16)
AST SERPL-CCNC: 17 U/L (ref 10–40)
BASOPHILS # BLD AUTO: 0.02 K/UL (ref 0–0.2)
BASOPHILS # BLD AUTO: 0.02 K/UL (ref 0–0.2)
BASOPHILS NFR BLD: 0.3 % (ref 0–1.9)
BASOPHILS NFR BLD: 0.3 % (ref 0–1.9)
BILIRUB SERPL-MCNC: 1 MG/DL (ref 0.1–1)
BUN SERPL-MCNC: 10 MG/DL (ref 8–23)
CALCIUM SERPL-MCNC: 8.3 MG/DL (ref 8.7–10.5)
CHLORIDE SERPL-SCNC: 106 MMOL/L (ref 95–110)
CO2 SERPL-SCNC: 27 MMOL/L (ref 23–29)
CREAT SERPL-MCNC: 0.6 MG/DL (ref 0.5–1.4)
DIFFERENTIAL METHOD: ABNORMAL
DIFFERENTIAL METHOD: ABNORMAL
EOSINOPHIL # BLD AUTO: 0.1 K/UL (ref 0–0.5)
EOSINOPHIL # BLD AUTO: 0.1 K/UL (ref 0–0.5)
EOSINOPHIL NFR BLD: 1.6 % (ref 0–8)
EOSINOPHIL NFR BLD: 1.6 % (ref 0–8)
ERYTHROCYTE [DISTWIDTH] IN BLOOD BY AUTOMATED COUNT: 13.2 % (ref 11.5–14.5)
ERYTHROCYTE [DISTWIDTH] IN BLOOD BY AUTOMATED COUNT: 13.2 % (ref 11.5–14.5)
EST. GFR  (NO RACE VARIABLE): >60 ML/MIN/1.73 M^2
GLUCOSE SERPL-MCNC: 130 MG/DL (ref 70–110)
GLUCOSE SERPL-MCNC: 136 MG/DL (ref 70–110)
GLUCOSE SERPL-MCNC: 195 MG/DL (ref 70–110)
HCT VFR BLD AUTO: 35.5 % (ref 37–48.5)
HCT VFR BLD AUTO: 35.6 % (ref 37–48.5)
HGB BLD-MCNC: 11.3 G/DL (ref 12–16)
HGB BLD-MCNC: 11.3 G/DL (ref 12–16)
IMM GRANULOCYTES # BLD AUTO: 0.01 K/UL (ref 0–0.04)
IMM GRANULOCYTES # BLD AUTO: 0.01 K/UL (ref 0–0.04)
IMM GRANULOCYTES NFR BLD AUTO: 0.1 % (ref 0–0.5)
IMM GRANULOCYTES NFR BLD AUTO: 0.1 % (ref 0–0.5)
LABCORP MISC TEST CODE: 1453
LABCORP MISC TEST NAME: NORMAL
LABCORP MISCELLANEOUS TEST: NORMAL
LYMPHOCYTES # BLD AUTO: 2.1 K/UL (ref 1–4.8)
LYMPHOCYTES # BLD AUTO: 2.6 K/UL (ref 1–4.8)
LYMPHOCYTES NFR BLD: 30.5 % (ref 18–48)
LYMPHOCYTES NFR BLD: 38.3 % (ref 18–48)
MAGNESIUM SERPL-MCNC: 1.8 MG/DL (ref 1.6–2.6)
MCH RBC QN AUTO: 30.1 PG (ref 27–31)
MCH RBC QN AUTO: 30.4 PG (ref 27–31)
MCHC RBC AUTO-ENTMCNC: 31.7 G/DL (ref 32–36)
MCHC RBC AUTO-ENTMCNC: 31.8 G/DL (ref 32–36)
MCV RBC AUTO: 95 FL (ref 82–98)
MCV RBC AUTO: 96 FL (ref 82–98)
MONOCYTES # BLD AUTO: 0.4 K/UL (ref 0.3–1)
MONOCYTES # BLD AUTO: 0.5 K/UL (ref 0.3–1)
MONOCYTES NFR BLD: 6.5 % (ref 4–15)
MONOCYTES NFR BLD: 7.9 % (ref 4–15)
NEUTROPHILS # BLD AUTO: 3.5 K/UL (ref 1.8–7.7)
NEUTROPHILS # BLD AUTO: 4.1 K/UL (ref 1.8–7.7)
NEUTROPHILS NFR BLD: 51.8 % (ref 38–73)
NEUTROPHILS NFR BLD: 61 % (ref 38–73)
NRBC BLD-RTO: 0 /100 WBC
NRBC BLD-RTO: 0 /100 WBC
PLATELET # BLD AUTO: 290 K/UL (ref 150–450)
PLATELET # BLD AUTO: 300 K/UL (ref 150–450)
PMV BLD AUTO: 9.5 FL (ref 9.2–12.9)
PMV BLD AUTO: 9.7 FL (ref 9.2–12.9)
POTASSIUM SERPL-SCNC: 3.8 MMOL/L (ref 3.5–5.1)
PROT SERPL-MCNC: 6.2 G/DL (ref 6–8.4)
RBC # BLD AUTO: 3.72 M/UL (ref 4–5.4)
RBC # BLD AUTO: 3.75 M/UL (ref 4–5.4)
SODIUM SERPL-SCNC: 137 MMOL/L (ref 136–145)
WBC # BLD AUTO: 6.73 K/UL (ref 3.9–12.7)
WBC # BLD AUTO: 6.81 K/UL (ref 3.9–12.7)

## 2022-10-06 PROCEDURE — 85025 COMPLETE CBC W/AUTO DIFF WBC: CPT | Mod: 91 | Performed by: FAMILY MEDICINE

## 2022-10-06 PROCEDURE — 80053 COMPREHEN METABOLIC PANEL: CPT | Performed by: FAMILY MEDICINE

## 2022-10-06 PROCEDURE — 96376 TX/PRO/DX INJ SAME DRUG ADON: CPT

## 2022-10-06 PROCEDURE — G0378 HOSPITAL OBSERVATION PER HR: HCPCS

## 2022-10-06 PROCEDURE — 63600175 PHARM REV CODE 636 W HCPCS: Performed by: HOSPITALIST

## 2022-10-06 PROCEDURE — 25000003 PHARM REV CODE 250: Performed by: FAMILY MEDICINE

## 2022-10-06 PROCEDURE — 96361 HYDRATE IV INFUSION ADD-ON: CPT

## 2022-10-06 PROCEDURE — 63600175 PHARM REV CODE 636 W HCPCS: Performed by: FAMILY MEDICINE

## 2022-10-06 PROCEDURE — 83735 ASSAY OF MAGNESIUM: CPT | Performed by: FAMILY MEDICINE

## 2022-10-06 PROCEDURE — 36415 COLL VENOUS BLD VENIPUNCTURE: CPT | Performed by: FAMILY MEDICINE

## 2022-10-06 PROCEDURE — C9113 INJ PANTOPRAZOLE SODIUM, VIA: HCPCS | Performed by: FAMILY MEDICINE

## 2022-10-06 PROCEDURE — 96367 TX/PROPH/DG ADDL SEQ IV INF: CPT

## 2022-10-06 PROCEDURE — 25000003 PHARM REV CODE 250: Performed by: HOSPITALIST

## 2022-10-06 RX ORDER — CEFUROXIME AXETIL 500 MG/1
500 TABLET ORAL EVERY 12 HOURS
Qty: 10 TABLET | Refills: 0 | Status: SHIPPED | OUTPATIENT
Start: 2022-10-06 | End: 2022-10-11

## 2022-10-06 RX ORDER — PANTOPRAZOLE SODIUM 40 MG/1
40 TABLET, DELAYED RELEASE ORAL 2 TIMES DAILY
Status: DISCONTINUED | OUTPATIENT
Start: 2022-10-06 | End: 2022-10-06 | Stop reason: HOSPADM

## 2022-10-06 RX ORDER — PANTOPRAZOLE SODIUM 40 MG/1
40 TABLET, DELAYED RELEASE ORAL DAILY
Qty: 90 TABLET | Refills: 0 | Status: SHIPPED | OUTPATIENT
Start: 2022-10-06 | End: 2022-12-28 | Stop reason: SDUPTHER

## 2022-10-06 RX ADMIN — BUSPIRONE HYDROCHLORIDE 2.5 MG: 5 TABLET ORAL at 08:10

## 2022-10-06 RX ADMIN — BUSPIRONE HYDROCHLORIDE 5 MG: 5 TABLET ORAL at 08:10

## 2022-10-06 RX ADMIN — SODIUM CHLORIDE: 0.9 INJECTION, SOLUTION INTRAVENOUS at 03:10

## 2022-10-06 RX ADMIN — PANTOPRAZOLE SODIUM 80 MG: 40 INJECTION, POWDER, FOR SOLUTION INTRAVENOUS at 08:10

## 2022-10-06 RX ADMIN — CEFTRIAXONE SODIUM 1 G: 1 INJECTION, POWDER, FOR SOLUTION INTRAMUSCULAR; INTRAVENOUS at 10:10

## 2022-10-06 NOTE — HOSPITAL COURSE
82-year-old bed-bound lady on Eliquis came with rectal bleed.  During her hospital stay she did not require any blood transfusion, CBC remains stable after holding Eliquis.  Since yesterday she is tolerating full liquid diet, CBCs stable, received PPI, was evaluated by GI.  Patient and  both were reluctant for endoscopic evaluation.  She was discharged home in hemodynamically stable condition with instructions to hold Eliquis for 3 more days.  She was advised to follow-up with her PCP.     I have seen the patient on the day of discharge and reviewed the discharge instructions as outlined below. Patient verbalized understanding and is aware to contact primary care physician or return to ED if new or worsening symptoms.

## 2022-10-06 NOTE — DISCHARGE SUMMARY
[facility] Medicine  Discharge Summary      Patient Name: Radha Alas  MRN: [id number]  Patient Class: OP- Observation  Admission Date: 10/4/2022  Hospital Length of Stay: 0 days  Discharge Date and Time:  10/06/2022 4:25 PM  Attending Physician: No att. providers found   Discharging Provider: Karma Naranjo MD  Primary Care Provider: Prabhu Clark MD          Hospital Course:   82-year-old bed-bound lady on Eliquis came with rectal bleed.  During her hospital stay she did not require any blood transfusion, CBC remains stable after holding Eliquis.  Since yesterday she is tolerating full liquid diet, CBCs stable, received PPI, was evaluated by GI.  Patient and  both were reluctant for endoscopic evaluation.  She was discharged home in hemodynamically stable condition with instructions to hold Eliquis for 3 more days.  She was advised to follow-up with her PCP.     I have seen the patient on the day of discharge and reviewed the discharge instructions as outlined below. Patient verbalized understanding and is aware to contact primary care physician or return to ED if new or worsening symptoms.        Goals of Care Treatment Preferences:  Code Status: Full Code      Consults:   Consults (From admission, onward)        Status Ordering Provider     Inpatient consult to Gastroenterology  Once        Provider:  MD Dottie Vee ALANA          No new Assessment & Plan notes have been filed under this hospital service since the last note was generated.  Service: Hospital Medicine    Final Active Diagnoses:    Diagnosis Date Noted POA    UTI (urinary tract infection) [N39.0] 10/06/2022 Yes    GERD without esophagitis [K21.9] 10/01/2019 Yes    Type 2 diabetes mellitus with diabetic neuropathy, with long-term current use of insulin [E11.40, Z79.4] 08/24/2016 Not Applicable    Chronic atrial fibrillation [I48.20]  Yes    Parkinson disease [G20] 06/23/2014 Yes     Chronic constipation [K59.09] 01/23/2013 Yes    History of endometrial cancer [Z85.42] 10/08/2012 Not Applicable      Problems Resolved During this Admission:    Diagnosis Date Noted Date Resolved POA    PRINCIPAL PROBLEM:  GI bleed [K92.2] 09/28/2016 10/06/2022 Yes       Discharged Condition: good    Disposition: Home or Self Care    Follow Up:   Follow-up Information     Prabhu Clark MD Follow up in 1 week(s).    Specialty: Family Medicine  Contact information:  [address] [facility] [address]  [phone]             Jair Kumari III, MD Follow up in 1 month(s).    Specialty: Gastroenterology  Why: If symptoms worsen  Contact information:  [address] [facility]  [place] [address]  [phone]                       Patient Instructions:      Diet diabetic     Notify your health care provider if you experience any of the following:   Order Comments: Persistent rectal bleeding     Activity as tolerated       Significant Diagnostic Studies: Labs: All labs within the past 24 hours have been reviewed    Pending Diagnostic Studies:     None         Medications:  Reconciled Home Medications:      Medication List      START taking these medications    cefUROXime 500 MG tablet  Commonly known as: CEFTIN  Take 1 tablet (500 mg total) by mouth every 12 (twelve) hours. for 5 days     pantoprazole 40 MG tablet  Commonly known as: PROTONIX  Take 1 tablet (40 mg total) by mouth once daily.        CHANGE how you take these medications    ELIQUIS 5 mg Tab  Generic drug: apixaban  TK 1 T PO BID  What changed:   · how much to take  · how to take this  · when to take this     lisinopriL 20 MG tablet  Commonly known as: PRINIVIL,ZESTRIL  1 TABLET ONCE DAILY  What changed:   · how much to take  · how to take this  · when to take this        CONTINUE taking these medications    busPIRone 7.5 MG tablet  Commonly known as: BUSPAR  Take 1 tablet (7.5 mg total) by mouth 3 (three) times daily.     diltiaZEM 120 MG  "Cp24  Commonly known as: CARTIA XT  Take 1 capsule (120 mg total) by mouth every evening.     DROPLET PEN NEEDLE 31 gauge x 3/16" Ndle  Generic drug: pen needle, diabetic  USE  TO  ADMINISTER  INSULIN ONE TIME DAILY     glimepiride 4 MG tablet  Commonly known as: AMARYL  Take 1 tablet (4 mg total) by mouth before breakfast.     LEVEMIR FLEXTOUCH U-100 INSULN 100 unit/mL (3 mL) Inpn pen  Generic drug: insulin detemir U-100  INJECT 20 UNITS INTO THE SKIN EVERY EVENING.     polyethylene glycol 17 gram/dose powder  Commonly known as: GLYCOLAX  Take 17 g by mouth daily as needed.     selegiline 5 mg Cap  Commonly known as: ELDEPRYL  Take 1 capsule (5 mg total) by mouth 2 (two) times daily.     TRUE METRIX GLUCOSE TEST STRIP Strp  Generic drug: blood sugar diagnostic  Test glucose levels 2-3 daily for diabetes     TYLENOL ARTHRITIS ORAL  Take by mouth.        STOP taking these medications    omeprazole 40 MG capsule  Commonly known as: PRILOSEC            Indwelling Lines/Drains at time of discharge:   Lines/Drains/Airways     None                 Time spent on the discharge of patient: 33 minutes         Karma Naranjo MD  Department of Hospital Medicine  [facility]  "

## 2022-10-06 NOTE — PLAN OF CARE
Patient to transport home via [facility] due to bed bound status. Spoke with Alexandra at [facility] [phone] to set up transportation.   Discharge orders and chart reviewed with no further post-acute discharge needs identified at this time.  At this time, patient is cleared for discharge from Case Management standpoint.        10/06/22 1121   Final Note   Assessment Type Final Discharge Note   Anticipated Discharge Disposition Home   Hospital Resources/Appts/Education Provided   (Post hospital appointment to be completed by patient's spouse.)   Post-Acute Status   Coverage [employer]   Discharge Delays None known at this time

## 2022-10-08 LAB — BACTERIA UR CULT: ABNORMAL

## 2022-11-09 ENCOUNTER — TELEPHONE (OUTPATIENT)
Dept: HOME HEALTH SERVICES | Facility: CLINIC | Age: 82
End: 2022-11-09
Payer: MEDICARE

## 2022-11-09 NOTE — TELEPHONE ENCOUNTER
Patient spouse canceled [facility] NP visit on 11/9/22 asking for an appointment the following week. Will schedule as per provider schedule has an opening.

## 2022-11-16 ENCOUNTER — CARE AT HOME (OUTPATIENT)
Dept: HOME HEALTH SERVICES | Facility: CLINIC | Age: 82
End: 2022-11-16
Payer: MEDICARE

## 2022-11-16 VITALS
DIASTOLIC BLOOD PRESSURE: 82 MMHG | HEART RATE: 70 BPM | SYSTOLIC BLOOD PRESSURE: 146 MMHG | OXYGEN SATURATION: 98 % | RESPIRATION RATE: 16 BRPM | TEMPERATURE: 99 F

## 2022-11-16 DIAGNOSIS — Z79.01 ANTICOAGULANT LONG-TERM USE: ICD-10-CM

## 2022-11-16 DIAGNOSIS — Z74.09 IMPAIRED MOBILITY AND ACTIVITIES OF DAILY LIVING: ICD-10-CM

## 2022-11-16 DIAGNOSIS — I15.2 HYPERTENSION ASSOCIATED WITH DIABETES: ICD-10-CM

## 2022-11-16 DIAGNOSIS — Z78.9 IMPAIRED MOBILITY AND ACTIVITIES OF DAILY LIVING: ICD-10-CM

## 2022-11-16 DIAGNOSIS — G20.A1 PARKINSON DISEASE: Primary | ICD-10-CM

## 2022-11-16 DIAGNOSIS — Z51.5 PALLIATIVE CARE ENCOUNTER: ICD-10-CM

## 2022-11-16 DIAGNOSIS — I48.20 CHRONIC ATRIAL FIBRILLATION: ICD-10-CM

## 2022-11-16 DIAGNOSIS — K21.9 GERD WITHOUT ESOPHAGITIS: ICD-10-CM

## 2022-11-16 DIAGNOSIS — E11.59 HYPERTENSION ASSOCIATED WITH DIABETES: ICD-10-CM

## 2022-11-16 PROCEDURE — 99349 HOME/RES VST EST MOD MDM 40: CPT | Mod: S$GLB,,, | Performed by: NURSE PRACTITIONER

## 2022-11-16 PROCEDURE — 99349 PR HOME VISIT,ESTAB PATIENT,LEVEL III: ICD-10-PCS | Mod: S$GLB,,, | Performed by: NURSE PRACTITIONER

## 2022-11-16 NOTE — PROGRESS NOTES
"[facility]  Palliative Care Home Visit    Visit Date: 11/16/2022  Encounter Provider: Rosa Collins NP  PCP:  Prabhu Clark MD    Subjective:      Patient ID: Radha Alas is a 82 y.o. female.    Consult Requested By: Shahnaz Iqbal NP(inactive)  Reason for Consult:  Palliative Care    Radha is being seen at home due to physical debility that presents a taxing effort to leave the home, to mitigate high risk of hospital readmission and/or due to the limited availability of reliable or safe options for transportation to the point of access to the provider. Prior to treatment on this visit the chart was reviewed and patient verbal consent was obtained.      Chief Complaint: Follow-up, Parkinson's    Radha is an 82 year old female with parkinson disease (2014), T2DM, a-fib, htn, hld, hx of dvt, chronic constipation, GERD, osteoarthritis and prolapse bladder.      Radha is being seen today to for a follow up Palliative Care visit. With this visit today Radha is found at home with spouse Isak present. As usual, Radha is found sitting in her recliner chair AAO x 3, pleasant, flat affect, slow to respond and with moderate tremors noted in both arms/hands and now in the left lower leg. Currently only on segiline.     Radha reports she is doing "fair" and has no current issues. Had a brief hospitalization in October for GIB (rectal bleeding). No transfusion required and endoscopy deferred by family. Spouse reports she was getting her eliquis (Afib) too close together, less than 12 hours apart and feels this was the cause. Now getting it BID and no further issues. Has GERD controlled with omeprazole.     Isak assists with all ADLs as Radha is profoundly weak and not ambulating. He uses a maegan lift to get her out of bed and transfers to a wheelchair. She spends most of her day in her recliner chair. Appetite is reported to be "good" and no swallowing issues reported. Radha is reportedly sleeping more over the last few " "weeks, napping during the day and sleeping 10 hours at night.     Isak checks patient's BG daily. Ranges of 116-165 reported over last few weeks. Gets SS levemir "a couple time a week" depending upon what she ate.        VSS. Denies fever, chest pain, shortness of breath, nausea, vomiting, diarrhea. Denies any acute issues, concerns or complaints to address on today's visit. Reports taking all medications as prescribed. No other needs identified at this time.         Goals of care discussed today. Radha wishes to be a full code and to be cared for at home by spouse. Isak agrees that the best choice is for Radha to be cared for at home. Advanced directives were discussed on last visit and forms were left for completion at that time. Isak has not completed yet.     Review of Systems   Constitutional: Negative for activity change, appetite change and unexpected weight change. Positive for fatigue.   HENT: Negative for congestion, facial swelling, sinus pressure, sinus pain and sneezing.    Eyes: Negative for visual disturbance.   Respiratory: Negative for cough, chest tightness, shortness of breath and wheezing.    Cardiovascular: Negative for leg swelling, chest pain and palpitations.   Gastrointestinal: Negative for abdominal pain, blood in stool, constipation, diarrhea, nausea and vomiting.        BM nearly daily.    Genitourinary: Positive for enuresis. Negative for dysuria, flank pain, frequency, hematuria and pelvic pain. Bowel and bladder incontinence.  Musculoskeletal: Positive for arthralgias, back pain and gait problem. Negative for joint swelling, myalgias, neck pain and neck stiffness.   Skin: Negative for rash, wound.  Neurological: Positive for tremors and weakness. Negative for dizziness, light-headedness and headaches.   Hematological: Negative for adenopathy. Does not bruise/bleed easily.   Psychiatric/Behavioral: Negative for dysphoric mood and sleep disturbance. Positive for anxiety at times.   " "  Assessments:  Environmental: lives with spouse Isak in a single story home that is clean with adequate lighting and temperature  Functional Status: can feed self finger foods but requires assistance with all other ADLs due to tremors from Parkinson's.  Safety: fall risk, recently slid out of chair  Nutritional: has adequate access, reports "good" appetite  Home Health/DME/Supplies: No HH.DME: wheelchair, rollator, glucometer, maegan lift.       History:  Past Medical History:   Diagnosis Date    Anemia     Atrial fibrillation     Chronic constipation     Deep vein thrombosis of left lower extremity     Diabetes mellitus     TYPE 2    Diverticulosis     Hypertension     Obesity     Parkinson disease     Peptic ulcer of stomach     Prolapse of female bladder, acquired     Rectocele     Uterine cancer 11-1-2011    STAGE 1-A GRADE 2     Family History   Problem Relation Age of Onset    Heart disease Mother     Diabetes Mother     Hypertension Father     Heart disease Sister     Diabetes Maternal Grandmother     Hypertension Maternal Grandmother     No Known Problems Brother     No Known Problems Daughter     No Known Problems Son     No Known Problems Maternal Aunt     No Known Problems Maternal Uncle     No Known Problems Paternal Aunt     No Known Problems Paternal Uncle     No Known Problems Maternal Grandfather     No Known Problems Paternal Grandmother     No Known Problems Paternal Grandfather     Breast cancer Neg Hx     Ovarian cancer Neg Hx     Uterine cancer Neg Hx     Colon cancer Neg Hx     Cancer Neg Hx     Cervical cancer Neg Hx     Endometrial cancer Neg Hx     Vaginal cancer Neg Hx     Colon polyps Neg Hx     Crohn's disease Neg Hx     Ulcerative colitis Neg Hx      Past Surgical History:   Procedure Laterality Date    COLONOSCOPY  prior to 2000    COLONOSCOPY N/A 9/28/2016    Procedure: COLONOSCOPY;  Surgeon: Baljinder Ospina MD;  Location: [facility];  Service: Endoscopy;  Laterality: N/A;    FLEXIBLE " "SIGMOIDOSCOPY  06/05/2016    at [facility]- in media section: poor prep, diverticulosis noted with old clots, internal hemorrhoids otherwise normal findings- recommend outpatient colonoscopy    FOREARM SURGERY      right forearm surgery    HERNIA REPAIR      ROBOTIC ASST    HYSTERECTOMY  11/1/2001    [facility]/BSO--cancer    PELVIC AND PARA-AORTIC LYMPH NODE DISSECTION      HI REMOVAL OF OVARY/TUBE(S)      ROBOTIC ASST    TUBAL LIGATION      UPPER GASTROINTESTINAL ENDOSCOPY       Review of patient's allergies indicates:   Allergen Reactions    Glucophage  [metformin]      Other reaction(s): Diarrhea  Other reaction(s): Diarrhea    Sulfa (sulfonamide antibiotics) Itching     Other reaction(s): Itching  Other reaction(s): Itching       Medications:    Current Outpatient Medications:     ACETAMINOPHEN (TYLENOL ARTHRITIS ORAL), Take by mouth., Disp: , Rfl:     busPIRone (BUSPAR) 7.5 MG tablet, Take 1 tablet (7.5 mg total) by mouth 3 (three) times daily., Disp: 90 tablet, Rfl: 11    diltiaZEM (CARTIA XT) 120 MG Cp24, Take 1 capsule (120 mg total) by mouth every evening., Disp: 90 capsule, Rfl: 3    DROPLET PEN NEEDLE 31 gauge x 3/16" Ndle, USE  TO  ADMINISTER  INSULIN ONE TIME DAILY, Disp: 100 each, Rfl: 11    ELIQUIS 5 mg Tab, TK 1 T PO BID (Patient taking differently: Take 5 mg by mouth 2 (two) times daily. TK 1 T PO BID), Disp: 180 tablet, Rfl: 3    glimepiride (AMARYL) 4 MG tablet, Take 1 tablet (4 mg total) by mouth before breakfast., Disp: 90 tablet, Rfl: 3    LEVEMIR FLEXTOUCH U-100 INSULN 100 unit/mL (3 mL) InPn pen, INJECT 20 UNITS INTO THE SKIN EVERY EVENING., Disp: 30 mL, Rfl: 0    lisinopriL (PRINIVIL,ZESTRIL) 20 MG tablet, 1 TABLET ONCE DAILY (Patient taking differently: Take 20 mg by mouth once daily. 1 TABLET ONCE DAILY), Disp: 90 tablet, Rfl: 3    pantoprazole (PROTONIX) 40 MG tablet, Take 1 tablet (40 mg total) by mouth once daily., Disp: 90 tablet, Rfl: 0    polyethylene glycol (GLYCOLAX) 17 gram/dose powder, Take " 17 g by mouth daily as needed., Disp: , Rfl:     selegiline (ELDEPRYL) 5 mg Cap, Take 1 capsule (5 mg total) by mouth 2 (two) times daily., Disp: 60 capsule, Rfl: 11    TRUE METRIX GLUCOSE TEST STRIP Strp, Test glucose levels 2-3 daily for diabetes, Disp: 200 strip, Rfl: 3    24h Oral Morphine Equivalents (OME):  N/A    Objective:     Physical Exam:  Vitals:    11/16/22 1300   BP: (!) 146/82   Pulse: 70   Resp: 16   Temp: 98.8 °F (37.1 °C)   TempSrc: Temporal   SpO2: 98%   PainSc: 0-No pain     There is no height or weight on file to calculate BMI.    Physical Exam  Constitutional:       General: She is not in acute distress.     Appearance: She is elderly, chronically ill, frail. She is obese. She is not diaphoretic.   HENT:      Head: Normocephalic and atraumatic.      Right Ear: External ear normal.      Left Ear: cerumen impaction.   Eyes:      Pupils: Pupils are equal, round, and reactive to light.   Neck:      Musculoskeletal: Neck supple. No muscular tenderness.   Cardiovascular:      Rate and Rhythm: Normal rate. Rhythm irregular.      Heart sounds: No friction rub. No gallop.    Pulmonary:      Effort: Pulmonary effort is normal. No respiratory distress.      Breath sounds: Normal breath sounds. No wheezing.   Chest:      Chest wall: No tenderness.   Abdominal:      General: Bowel sounds are normal. There is no distension.      Palpations: Abdomen is soft.      Tenderness: There is no abdominal tenderness.   Musculoskeletal: Normal range of motion.         General: No tenderness. No swelling noted today.     Comments: Deformity noted to bilateral hands, L hand contracture worse   Skin:     General: Skin is warm and dry. Intact.     Capillary Refill: Capillary refill takes less than 2 seconds.      Findings:  No bruising. No lesion present.    Neurological:      Mental Status: She is alert and oriented to person, place, and time.      Sensory: No sensory deficit.      Motor: Weakness present.      Gait: Gait  abnormal.      Comments: Decreased strength RUE 4/5, LUE 3/5  BLE 3/5. Bilateral hand and left leg tremors.   Psychiatric:         Behavior: Behavior normal. Flat affect.        Thought Content: Thought content normal.         Judgment: Judgment normal.            Review of Symptoms     Symptom Assessment (ESAS 0-10 Scale)  Pain:  0  Dyspnea:  0  Anxiety:  1  Nausea:  0  Depression:  0  Anorexia:  0  Fatigue:  5  Insomnia:  0  Restlessness:  0  Agitation:  0      CAM / Delirium:  Negative  Constipation:  Positive  Diarrhea:  Negative     Bowel Management Plan (BMP):  No       Comments:  Normally has BM qd- qod, incontinence reported due to weakness and inability to get to toilet in time frequently        Performance Status:  50     ECOG Performance Status Grade:  3 - Confined to bed or chair 100% of waking hours     Living Arrangements:  Lives with spouse     Psychosocial/Cultural: Retired teacher     Spiritual:  F - Grace and Belief:  Buddhism  I - Importance:  Very important  C - Community:  Does not attend Mu-[employer] since Covid  A - Address in Care:  No issues identified/reported     Time-Based Charting:  Yes  Chart Review: 5 minutes  Face to Face: 20 minutes  Symptom Assessment: 5 minutes                  Goals of Care: 5 minutes                   Total Time Spent: 35 minutes        Advance Care Planning   Advance Directives:   Living Will: No    [place]: No    Do Not Resuscitate Status: No    Medical Power of : Yes (pt verbalizes her spouse Isak has HPOA)       Decision Making:  Patient answered questions and Family answered questions            Labs:  CBC:   WBC   Date Value Ref Range Status   10/06/2022 6.73 3.90 - 12.70 K/uL Final         Hemoglobin   Date Value Ref Range Status   10/06/2022 11.3 (L) 12.0 - 16.0 g/dL Final         Hematocrit   Date Value Ref Range Status   10/06/2022 35.6 (L) 37.0 - 48.5 % Final         MCV   Date Value Ref Range Status   10/06/2022 96 82 - 98 fL Final         Platelets    Date Value Ref Range Status   10/06/2022 300 150 - 450 K/uL Final       LFT:   Lab Results   Component Value Date    AST 17 10/06/2022    ALKPHOS 57 10/06/2022    BILITOT 1.0 10/06/2022       Albumin:   Albumin   Date Value Ref Range Status   10/06/2022 3.1 (L) 3.5 - 5.2 g/dL Final     Protein:   Total Protein   Date Value Ref Range Status   10/06/2022 6.2 6.0 - 8.4 g/dL Final       Radiology:  I have reviewed all pertinent imaging results/findings within the past 24 hours.      Assessment:     1. Parkinson disease    2. Hypertension associated with diabetes    3. GERD without esophagitis    4. Palliative care encounter    5. Impaired mobility and activities of daily living    6. Chronic atrial fibrillation    7. Anticoagulant long-term use          Plan:   Radha was seen today for follow-up.    Diagnoses and all orders for this visit:    Parkinson disease    Hypertension associated with diabetes    GERD without esophagitis    Palliative care encounter    Impaired mobility and activities of daily living    Chronic atrial fibrillation    Anticoagulant long-term use      Problem List Items Addressed This Visit          Neuro    Parkinson disease - Primary     Chronic and stable.  diagnosed in 2014, followed by Neurology  severe bradykinesia and resting tremors  Unable to tolerate donepezil and carbidopa with increased confusion/hallucinations  Tolerating selegiline.  Increased weakness and wheelchair bound.  Spouse is primary caregiver.             Cardiac/Vascular    Hypertension associated with diabetes     BP controlled on current regimen.  BG stable on current regimen as well.  No edema.  Continue to monitor.            Chronic atrial fibrillation     Chronic and stable.  On eliquis and diltiazem.   Recent Rectal bleeding/Oct 2022-resolved.  No further bleeding reported.             Hematology    Anticoagulant long-term use     Eliquis/Afib.  Bleeding precautions and safety discussed.            GI    GERD  without esophagitis     Controlled on omeprazole  Continue current regimen.             Palliative Care    Palliative care encounter     Currently full code. No documents in Epic. Discussed and reviewed Advanced Directives with spouse and patient on last visit.  HPOA, Living Will, [place] forms left for completion on last visit, spouse has not completed yet.             Other    Impaired mobility and activities of daily living     Chronic and related to Parkinsons.  Wheelchair bound, requires use of maegan lift.   Spouse provides assistance with all ADLs.  HH PT not wanted presently.                Were controlled substances prescribed?  No    > 50% of 30 min visit spent in chart review, face to face discussion of goals of care,  symptom assessment, coordination of care and emotional support.        Follow Up Appointments:   No future appointments.      Signature:  Rosa Collins NP

## 2022-11-26 PROBLEM — Z79.01 ANTICOAGULANT LONG-TERM USE: Status: ACTIVE | Noted: 2022-11-26

## 2022-11-26 NOTE — ASSESSMENT & PLAN NOTE
Chronic and stable.  diagnosed in 2014, followed by Neurology  severe bradykinesia and resting tremors  Unable to tolerate donepezil and carbidopa with increased confusion/hallucinations  Tolerating selegiline.  Increased weakness and wheelchair bound.  Spouse is primary caregiver.

## 2022-11-26 NOTE — ASSESSMENT & PLAN NOTE
BP controlled on current regimen.  BG stable on current regimen as well.  No edema.  Continue to monitor.

## 2022-11-26 NOTE — ASSESSMENT & PLAN NOTE
Chronic and stable.  On eliquis and diltiazem.   Recent Rectal bleeding/Oct 2022-resolved.  No further bleeding reported.

## 2022-11-26 NOTE — ASSESSMENT & PLAN NOTE
Chronic and related to Parkinsons.  Wheelchair bound, requires use of maegan lift.   Spouse provides assistance with all ADLs.  HH PT not wanted presently.

## 2022-12-16 NOTE — PROGRESS NOTES
The patient location is: home  The chief complaint leading to consultation is: PD  Visit type: audiovisual  Total time spent with patient: 40mins  Each patient to whom he or she provides medical services by telemedicine is:  (1) informed of the relationship between the physician and patient and the respective role of any other health care provider with respect to management of the patient; and (2) notified that he or she may decline to receive medical services by telemedicine and may withdraw from such care at any time.    Notes: below    MOVEMENT DISORDERS CLINIC NEW VIDEO CONSULT NOTE    PCP/Referring Provider: Prabhu Clark MD  [address] [place] [address]  Date of Service: 5/16/2022    Chief Complaint: PD    Her son as historian  HPI: Radha Alas is a R HANDED 81 y.o. female with a medical issues significant for afib, DM2, depression, DVT, coming for PD care. Son reports issues with gait since 8 years. 4 years of tremors both hand began at the same time. She notes L arm is less mobile than R. R leg moves better than L. Cane 4 years ago, then walker 2 years ago, no wheelchair since 1-2 years. At this point can only walk with assist very short distances.    Medication history  On selgiline 5mg PO BID  Was taking carbidopa/levodopa 25/100mg 1 tab CR PO BID - when she takes this it caused confusion and hallucinations    No PDism in family    Neuroleptic exposure:  Prior was on abilify 10 years ago  Currently on buspar    Current Living Situation:  home    PD Review of Symptoms:  Anosmia: yes  Dysarthria/Hypophonia: -  Dysphagia/Sialorrhea: -  Depression: -  Cognitive slowing: -  Hallucinations: -  Impulsivity: -  Obsessions/Compulsions: -  Urinary changes: -  Constipation: -  Orthostasis: -  Erectile Dysfunction: -  Dyskinesia: -  Falls: -  Freezing: -  Micrographia: -  Sleep issues:  -JEAN: -  -RBD: yes    Review of Systems:   Review of Systems   Constitutional: Negative for fever.   HENT: Negative for  "congestion.    Eyes: Negative for double vision.   Respiratory: Negative for cough and shortness of breath.    Cardiovascular: Negative for chest pain and leg swelling.   Gastrointestinal: Negative for nausea.   Genitourinary: Negative for dysuria.   Musculoskeletal: Positive for falls.   Skin: Negative for rash.   Neurological: Positive for tremors and speech change. Negative for headaches.   Psychiatric/Behavioral: Positive for hallucinations and memory loss. Negative for depression.         Current Medications:  Outpatient Encounter Medications as of 5/16/2022   Medication Sig Dispense Refill    ACETAMINOPHEN (TYLENOL ARTHRITIS ORAL) Take by mouth.      busPIRone (BUSPAR) 7.5 MG tablet Take 1 tablet (7.5 mg total) by mouth 3 (three) times daily. 90 tablet 11    carbidopa-levodopa  mg (SINEMET CR)  mg TbSR Take 1 tablet by mouth 2 (two) times daily. 60 tablet 11    diltiaZEM (CARTIA XT) 120 MG Cp24 Take 1 capsule (120 mg total) by mouth every evening. 90 capsule 3    DROPLET PEN NEEDLE 31 gauge x 3/16" Ndle USE  TO  ADMINISTER  INSULIN ONE TIME DAILY 100 each 11    ELIQUIS 5 mg Tab TK 1 T PO  tablet 3    glimepiride (AMARYL) 4 MG tablet Take 1 tablet (4 mg total) by mouth before breakfast. 90 tablet 3    insulin detemir U-100 (LEVEMIR FLEXTOUCH U-100 INSULN) 100 unit/mL (3 mL) InPn pen Inject 20 Units into the skin every evening. 5 each 3    lisinopriL (PRINIVIL,ZESTRIL) 20 MG tablet 1 TABLET ONCE DAILY 90 tablet 3    omeprazole (PRILOSEC) 40 MG capsule Take 1 capsule (40 mg total) by mouth once daily. 90 capsule 3    polyethylene glycol (GLYCOLAX) 17 gram/dose powder TK 17 GRAMS PO QD UTD      selegiline (ELDEPRYL) 5 mg Cap Take 1 capsule (5 mg total) by mouth 2 (two) times daily. 60 capsule 11    TRUE METRIX GLUCOSE TEST STRIP Strp Test glucose levels 2-3 daily for diabetes 200 strip 3     No facility-administered encounter medications on file as of 5/16/2022.       Past Medical " History:  Patient Active Problem List   Diagnosis    History of endometrial cancer    Rectocele    Chronic constipation    Vaginal atrophy    Cystocele    Nocturia    Parkinson disease    Chronic atrial fibrillation    Vaginal vault prolapse, posthysterectomy    Mixed urge and stress incontinence    Hypertension associated with diabetes    Type 2 diabetes mellitus with diabetic neuropathy, with long-term current use of insulin    History of DVT (deep vein thrombosis)    Primary osteoarthritis involving multiple joints    Hyperlipidemia associated with type 2 diabetes mellitus    GERD without esophagitis    Diverticulosis    Need for assistance due to reduced mobility    Palliative care encounter    Advanced directives, counseling/discussion    Anxiety    History of recent fall    Impaired mobility and activities of daily living    Situational depression    Weakness    Hearing loss of left ear due to cerumen impaction       Past Surgical History:  Past Surgical History:   Procedure Laterality Date    COLONOSCOPY  prior to 2000    COLONOSCOPY N/A 9/28/2016    Procedure: COLONOSCOPY;  Surgeon: Baljinder Ospina MD;  [facility];  Service: Endoscopy;  Laterality: N/A;    FLEXIBLE SIGMOIDOSCOPY  06/05/2016    at [facility]- in media section: poor prep, diverticulosis noted with old clots, internal hemorrhoids otherwise normal findings- recommend outpatient colonoscopy    FOREARM SURGERY      right forearm surgery    HERNIA REPAIR      ROBOTIC ASST    HYSTERECTOMY  11/1/2001    [facility]--cancer    PELVIC AND PARA-AORTIC LYMPH NODE DISSECTION      MN REMOVAL OF OVARY/TUBE(S)      ROBOTIC ASST    TUBAL LIGATION      UPPER GASTROINTESTINAL ENDOSCOPY         Social:  Social History     Socioeconomic History    Marital status:      Spouse name: Isak Evette   Occupational History    Occupation: retired teacher   Tobacco Use    Smoking status: Never Smoker    Smokeless tobacco: Never  Used   Substance and Sexual Activity    Alcohol use: No     Alcohol/week: 0.0 standard drinks    Drug use: No    Sexual activity: Not Currently     Social Determinants of Health     Financial Resource Strain: Low Risk     Difficulty of Paying Living Expenses: Not hard at all   Food Insecurity: No Food Insecurity    Worried About Running Out of Food in the Last Year: Never true    Ran Out of Food in the Last Year: Never true   Transportation Needs: No Transportation Needs    Lack of Transportation (Medical): No    Lack of Transportation (Non-Medical): No   Physical Activity: Inactive    Days of Exercise per Week: 0 days    Minutes of Exercise per Session: 0 min   Stress: No Stress Concern Present    Feeling of Stress : Not at all   Social Connections: Unknown    Frequency of Communication with Friends and Family: More than three times a week    Frequency of Social Gatherings with Friends and Family: Twice a week    Active Member of Clubs or Organizations: Yes    Attends Club or Organization Meetings: 1 to 4 times per year    Marital Status:    Housing Stability: Low Risk     Unable to Pay for Housing in the Last Year: No    Number of Places Lived in the Last Year: 1    Unstable Housing in the Last Year: No       Family History:  Family History   Problem Relation Age of Onset    Heart disease Mother     Diabetes Mother     Hypertension Father     Heart disease Sister     Diabetes Maternal Grandmother     Hypertension Maternal Grandmother     No Known Problems Brother     No Known Problems Daughter     No Known Problems Son     No Known Problems Maternal Aunt     No Known Problems Maternal Uncle     No Known Problems Paternal Aunt     No Known Problems Paternal Uncle     No Known Problems Maternal Grandfather     No Known Problems Paternal Grandmother     No Known Problems Paternal Grandfather     Breast cancer Neg Hx     Ovarian cancer Neg Hx     Uterine cancer Neg Hx      Colon cancer Neg Hx     Cancer Neg Hx     Cervical cancer Neg Hx     Endometrial cancer Neg Hx     Vaginal cancer Neg Hx     Colon polyps Neg Hx     Crohn's disease Neg Hx     Ulcerative colitis Neg Hx        PHYSICAL:  There were no vitals taken for this visit.    Physical Exam  Constitutional: Well-developed, well-nourished, appears stated age  Eyes: No scleral icterus  ENT: Moist oral mucosa  Cardiovascular: No lower extremity edema   Respiratory: No labored breathing   Skin: No rash   Hematologic: No bruising  Other: GI/ deferred   · Mental status: Alert and oriented to person, place, time, and situation;   · Speech: normal (not dysarthric), no aphasia  · Cranial nerves:            · CN II: Pupils mid-position and equal, not tested light or accommodation  · CN III, IV, VI: Extraocular movements full, no nystagmus visualized  · CN V: Not tested   · CN VII: Face strong and symmetric bilaterally   · CN VIII: Hearing intact to voice and conversation   · CN IX, X: Palate raises midline and symmetric   · CN XI: Strong shoulder shrug B/L  · CN XII: Tongue appears midline   · Motor: Normal bulk by appearance, no drift   · Sensory: Not tested    · Gait: Not tested  · Deep tendon reflexes: Not tested  · Movement/Coordination                    Severe hypophonic speech.                     Severe facial masking.  Minor b/l hand pillrolling tremor    Bradykinesia  ? Finger taps Finger flicks JAQUELIN Heel taps   Left 3+ 2+ 2+ 2+   Right 3+ 2+ 2+ 2+       Laboratory Data:  NA    Imaging:  NA    Assessment//Plan:   Problem List Items Addressed This Visit        Neuro    Parkinson disease    Overview     Following with Dr. Flanagan.           Current Assessment & Plan     B/L profound PDism since 4-8 years.  She is unfortunately acutely sensitive to Ldopa - confusions and hallucinations with small doses 25/100 CR.  Suggested try 1/2 dose. Ask cardiologist if donepezil would be tolerated.  If so, try donepezil 5mg QHS and  then try carbidopa/levodopa 25/100mg 1 tab PO TID                  Cierra Rodriguez MD, MS  [facility]  Department of Neurology  Movement Disorders       done

## 2022-12-21 ENCOUNTER — PATIENT MESSAGE (OUTPATIENT)
Dept: HOME HEALTH SERVICES | Facility: CLINIC | Age: 82
End: 2022-12-21
Payer: MEDICARE

## 2022-12-27 ENCOUNTER — PATIENT MESSAGE (OUTPATIENT)
Dept: FAMILY MEDICINE | Facility: CLINIC | Age: 82
End: 2022-12-27
Payer: MEDICARE

## 2022-12-28 RX ORDER — PANTOPRAZOLE SODIUM 40 MG/1
40 TABLET, DELAYED RELEASE ORAL DAILY
Qty: 90 TABLET | Refills: 0 | Status: SHIPPED | OUTPATIENT
Start: 2022-12-28 | End: 2023-01-04 | Stop reason: SDUPTHER

## 2022-12-28 NOTE — TELEPHONE ENCOUNTER
No new care gaps identified.  [facility] Embedded Care Gaps. Reference number: [id number]. 12/28/2022   8:37:54 AM CST

## 2022-12-28 NOTE — TELEPHONE ENCOUNTER
On call refill request.  Dr. Clark is out of the office/on vacation.   Please advise. Thank you.     LR--10-6-22  LOV--7-7-22 (care at home/NP Dennis) Last visit w/Dr. Clark 4-29-22  FOV--2-16-23  (care at home/NP Dennis)

## 2023-01-04 ENCOUNTER — PATIENT MESSAGE (OUTPATIENT)
Dept: FAMILY MEDICINE | Facility: CLINIC | Age: 83
End: 2023-01-04
Payer: MEDICARE

## 2023-01-04 RX ORDER — PANTOPRAZOLE SODIUM 40 MG/1
40 TABLET, DELAYED RELEASE ORAL DAILY
Qty: 90 TABLET | Refills: 0 | Status: SHIPPED | OUTPATIENT
Start: 2023-01-04 | End: 2023-04-03 | Stop reason: SDUPTHER

## 2023-01-04 NOTE — TELEPHONE ENCOUNTER
Can you please send a short supply of the medication below to the local pharmacy as the mail order is currently out. Thank you !

## 2023-01-04 NOTE — TELEPHONE ENCOUNTER
Care Due:                  Date            Visit Type   Department     Provider  --------------------------------------------------------------------------------                                EP -                              PRIMARY      [facility]    Prabhu De Santiago  Last Visit: 04-      CARE ([facility])   [facility]       [facility]  Next Visit: None Scheduled  None         None Found                                                            Last  Test          Frequency    Reason                     Performed    Due Date  --------------------------------------------------------------------------------    HBA1C.......  6 months...  LEVEMIR..................  10-   04-    [facility] Embedded Care Gaps. Reference number: [id number]. 1/04/2023   3:32:33 PM CST

## 2023-01-09 PROBLEM — N39.0 UTI (URINARY TRACT INFECTION): Status: RESOLVED | Noted: 2022-10-06 | Resolved: 2023-01-09

## 2023-01-11 ENCOUNTER — PATIENT MESSAGE (OUTPATIENT)
Dept: ADMINISTRATIVE | Facility: CLINIC | Age: 83
End: 2023-01-11
Payer: MEDICARE

## 2023-01-19 ENCOUNTER — TELEPHONE (OUTPATIENT)
Dept: ADMINISTRATIVE | Facility: CLINIC | Age: 83
End: 2023-01-19
Payer: MEDICARE

## 2023-01-19 NOTE — TELEPHONE ENCOUNTER
Spoke with patient's spouse regarding [facility] care at home appointment.  Notified that the NP on the Fuig is no longer with the program and another provider will be trained to service the area .  Patient's spouse verbalized understanding and states he wants her to remain on the waiting list once visits continue.

## 2023-01-23 NOTE — TELEPHONE ENCOUNTER
No new care gaps identified.  [facility] Embedded Care Gaps. Reference number: [id number]. 1/23/2023   5:43:15 PM CST

## 2023-01-24 ENCOUNTER — PATIENT MESSAGE (OUTPATIENT)
Dept: FAMILY MEDICINE | Facility: CLINIC | Age: 83
End: 2023-01-24
Payer: MEDICARE

## 2023-01-24 RX ORDER — DILTIAZEM HYDROCHLORIDE 120 MG/1
120 CAPSULE, COATED, EXTENDED RELEASE ORAL NIGHTLY
Qty: 90 CAPSULE | Refills: 0 | Status: SHIPPED | OUTPATIENT
Start: 2023-01-24 | End: 2023-04-26

## 2023-01-24 NOTE — TELEPHONE ENCOUNTER
Sent [employer] message to patient regarding an OV before future refills.

## 2023-01-24 NOTE — TELEPHONE ENCOUNTER
Flu consent signed by patient. Flu vaccine administered.   LOV: 4/29/22  Next appt: none  Labs: 4/29/22  Last refill: 4/4/22

## 2023-04-05 ENCOUNTER — PATIENT MESSAGE (OUTPATIENT)
Dept: FAMILY MEDICINE | Facility: CLINIC | Age: 83
End: 2023-04-05
Payer: MEDICARE

## 2023-04-05 NOTE — TELEPHONE ENCOUNTER
No new care gaps identified.  [facility] Embedded Care Gaps. Reference number: [id number]. 4/05/2023   2:02:24 PM CDT

## 2023-04-06 RX ORDER — PEN NEEDLE, DIABETIC 30 GX3/16"
NEEDLE, DISPOSABLE MISCELLANEOUS
Qty: 100 EACH | Refills: 11 | Status: SHIPPED | OUTPATIENT
Start: 2023-04-06

## 2023-04-06 RX ORDER — GLIMEPIRIDE 4 MG/1
4 TABLET ORAL
Qty: 90 TABLET | Refills: 3 | Status: SHIPPED | OUTPATIENT
Start: 2023-04-06 | End: 2024-01-31

## 2023-04-13 NOTE — TELEPHONE ENCOUNTER
I spoke with pts emergency contact Mr. Parisi, I advised him that I am unable to move the appointment up and Dr. Clark is booked a far ways out. I advised him that he should keep that appointment if possible.   
0 = independent

## 2023-04-17 DIAGNOSIS — G20.A1 PARKINSON DISEASE: ICD-10-CM

## 2023-04-19 RX ORDER — SELEGILINE HYDROCHLORIDE 5 MG/1
CAPSULE ORAL
Qty: 180 CAPSULE | Refills: 4 | Status: SHIPPED | OUTPATIENT
Start: 2023-04-19

## 2023-04-19 RX ORDER — APIXABAN 5 MG/1
TABLET, FILM COATED ORAL
Qty: 180 TABLET | Refills: 3 | Status: SHIPPED | OUTPATIENT
Start: 2023-04-19 | End: 2024-03-04

## 2023-05-18 ENCOUNTER — TELEPHONE (OUTPATIENT)
Dept: CARDIOLOGY | Facility: CLINIC | Age: 83
End: 2023-05-18
Payer: MEDICARE

## 2023-05-18 NOTE — LETTER
"     May 18, 2023    Radha Alas  [address] [place] [id number]             [facility]  [address]  [place] [address]  Phone: [phone]  Fax: [phone] Patient: Radha Alas  : 1940  Referring Doctor: Dr. Johnson  Procedure: Dental Extractions    Current Outpatient Medications   Medication Sig    ACETAMINOPHEN (TYLENOL ARTHRITIS ORAL) Take by mouth.    busPIRone (BUSPAR) 7.5 MG tablet Take 1 tablet (7.5 mg total) by mouth 3 (three) times daily.    diltiaZEM (CARDIZEM CD) 120 MG Cp24 TAKE 1 CAPSULE EVERY EVENING.    ELIQUIS 5 mg Tab TAKE 1 TABLET TWICE DAILY    glimepiride (AMARYL) 4 MG tablet Take 1 tablet (4 mg total) by mouth before breakfast.    LEVEMIR FLEXTOUCH U-100 INSULN 100 unit/mL (3 mL) InPn pen INJECT 20 UNITS INTO THE SKIN EVERY EVENING.    lisinopriL (PRINIVIL,ZESTRIL) 20 MG tablet 1 TABLET ONCE DAILY (Patient taking differently: Take 20 mg by mouth once daily. 1 TABLET ONCE DAILY)    pantoprazole (PROTONIX) 40 MG tablet Take 1 tablet (40 mg total) by mouth once daily.    pen needle, diabetic (DROPLET PEN NEEDLE) 31 gauge x 3/16" Ndle USE  TO  ADMINISTER  INSULIN ONE TIME DAILY    polyethylene glycol (GLYCOLAX) 17 gram/dose powder Take 17 g by mouth daily as needed.    selegiline (ELDEPRYL) 5 mg Cap TAKE 1 CAPSULE TWICE DAILY    TRUE METRIX GLUCOSE TEST STRIP Strp Test glucose levels 2-3 daily for diabetes     No current facility-administered medications for this visit.       This patient has been assessed for risk factors for clearance of surgery with the following stipulations:    _X__ No contraindications  _X__ Recommendations for Eliquis, hold x __2 days  _X__ Cleared for surgery with moderate risks      If you have any questions regarding the above, please contact my office at [phone].    Sincerely,    China Jones NP          "

## 2023-06-07 ENCOUNTER — CARE AT HOME (OUTPATIENT)
Dept: HOME HEALTH SERVICES | Facility: CLINIC | Age: 83
End: 2023-06-07
Payer: MEDICARE

## 2023-06-07 DIAGNOSIS — Z78.9 IMPAIRED MOBILITY AND ACTIVITIES OF DAILY LIVING: ICD-10-CM

## 2023-06-07 DIAGNOSIS — K59.09 CHRONIC CONSTIPATION: ICD-10-CM

## 2023-06-07 DIAGNOSIS — Z74.09 IMPAIRED MOBILITY AND ACTIVITIES OF DAILY LIVING: ICD-10-CM

## 2023-06-07 PROCEDURE — 99441 PR PHYSICIAN TELEPHONE EVALUATION 5-10 MIN: CPT | Mod: 95,,,

## 2023-06-07 PROCEDURE — 99441 PR PHYSICIAN TELEPHONE EVALUATION 5-10 MIN: ICD-10-PCS | Mod: 95,,,

## 2023-06-07 NOTE — ASSESSMENT & PLAN NOTE
Chronic  History of Parkinsons  Unable to ambulate  Wheelchair bound at baseline  Use of maegan lift for transfers   reports she is becoming more dependent on him with ADL's  Will have NP at home visit soon for follow up

## 2023-06-07 NOTE — PROGRESS NOTES
Established Patient - Audio Only Telehealth Visit     The patient location is: [place]  The chief complaint leading to consultation is: Routine follow up  Visit type: Virtual visit with audio only (telephone)  Total time spent with patient: 10 min       The reason for the audio only service rather than synchronous audio and video virtual visit was related to technical difficulties or patient preference/necessity.     Each patient to whom I provide medical services by telemedicine is:  (1) informed of the relationship between the physician and patient and the respective role of any other health care provider with respect to management of the patient; and (2) notified that they may decline to receive medical services by telemedicine and may withdraw from such care at any time. Patient verbally consented to receive this service via voice-only telephone call.       HPI:   Past Medical History:   Diagnosis Date    Anemia     Atrial fibrillation     Chronic constipation     Deep vein thrombosis of left lower extremity     Diabetes mellitus     TYPE 2    Diverticulosis     Hypertension     Obesity     Parkinson disease     Peptic ulcer of stomach     Prolapse of female bladder, acquired     Rectocele     Uterine cancer 11-1-2011    STAGE 1-A GRADE 2      Current Outpatient Medications on File Prior to Visit   Medication Sig Dispense Refill    ACETAMINOPHEN (TYLENOL ARTHRITIS ORAL) Take by mouth.      busPIRone (BUSPAR) 7.5 MG tablet Take 1 tablet (7.5 mg total) by mouth 3 (three) times daily. 90 tablet 11    diltiaZEM (CARDIZEM CD) 120 MG Cp24 TAKE 1 CAPSULE EVERY EVENING. 90 capsule 0    ELIQUIS 5 mg Tab TAKE 1 TABLET TWICE DAILY 180 tablet 3    glimepiride (AMARYL) 4 MG tablet Take 1 tablet (4 mg total) by mouth before breakfast. 90 tablet 3    LEVEMIR FLEXTOUCH U-100 INSULN 100 unit/mL (3 mL) InPn pen INJECT 20 UNITS INTO THE SKIN EVERY EVENING. 30 mL 0    lisinopriL (PRINIVIL,ZESTRIL) 20 MG tablet 1 TABLET ONCE  "DAILY (Patient taking differently: Take 20 mg by mouth once daily. 1 TABLET ONCE DAILY) 90 tablet 3    pantoprazole (PROTONIX) 40 MG tablet Take 1 tablet (40 mg total) by mouth once daily. 90 tablet 4    pen needle, diabetic (DROPLET PEN NEEDLE) 31 gauge x 3/16" Ndle USE  TO  ADMINISTER  INSULIN ONE TIME DAILY 100 each 11    polyethylene glycol (GLYCOLAX) 17 gram/dose powder Take 17 g by mouth daily as needed.      selegiline (ELDEPRYL) 5 mg Cap TAKE 1 CAPSULE TWICE DAILY 180 capsule 4    TRUE METRIX GLUCOSE TEST STRIP Strp Test glucose levels 2-3 daily for diabetes 200 strip 3     No current facility-administered medications on file prior to visit.         Assessment and plan:    Problem List Items Addressed This Visit          GI    Chronic constipation     Stable currently  Reports daily BM's  Continue plan            Other    Impaired mobility and activities of daily living     Chronic  History of Parkinsons  Unable to ambulate  Wheelchair bound at baseline  Use of maegan lift for transfers   reports she is becoming more dependent on him with ADL's  Will have NP at home visit soon for follow up                - Continue all medications, treatments and therapies as ordered.   - Follow all instructions, recommendations as discussed.  - Maintain Safety Precautions at all times.  - Attend all medical appointments as scheduled.  - For worsening symptoms: call Primary Care Physician or Nurse Practitioner.  - For emergencies, call 911 or immediately report to the nearest emergency room.   Questions elicited. Time allowed for questions, all issues addressed. Contact info given for any concerns or changes.   -will have NP at home to perform visit                 This service was not originating from a related E/M service provided within the previous 7 days nor will  to an E/M service or procedure within the next 24 hours or my soonest available appointment.  Prevailing standard of care was able to be met in " this audio-only visit.

## 2023-06-08 LAB
LEFT EYE DM RETINOPATHY: NEGATIVE
RIGHT EYE DM RETINOPATHY: NEGATIVE

## 2023-06-16 ENCOUNTER — PATIENT OUTREACH (OUTPATIENT)
Dept: ADMINISTRATIVE | Facility: HOSPITAL | Age: 83
End: 2023-06-16
Payer: MEDICARE

## 2023-06-17 NOTE — PROGRESS NOTES
Population Health Chart Review & Patient Outreach Details:     Reason for Outreach Encounter:     [x]  Non-Compliant Report   []  Payor Report (Hum[employer], PHN, BCBS, MSSP, MCIP, UHC, etc.)   []  Pre-Visit Chart Review     Updates Requested / Reviewed:     []  Care Everywhere    []     []  External Sources ([facility], [facility], DIS, etc.)   []  Care Team Updated    Patient Outreach Method:    []  Telephone Outreach Completed   [] Successful   [] Left Voicemail   [] Unable to Contact (wrong number, no voicemail)  []  [facility] Portal Outreach Sent  []  Letter Outreach Mailed  []  Fax Sent for External Records  [x]  External Records Upload    Health Maintenance Topics Addressed and Outreach Outcomes / Actions Taken:        []      Breast Cancer Screening []  Mammo Scheduled      []  External Records Requested     []  Added Reminder to Complete to Upcoming Primary Care Appt Notes     []  Patient Declined     []  Patient Will Call Back to Schedule     []  Patient Will Schedule with External Provider / Order Routed if Applicable             []       Cervical Cancer Screening []  Pap Scheduled      []  External Records Requested     []  Added Reminder to Complete to Upcoming Primary Care Appt Notes     []  Patient Declined     []  Patient Will Call Back to Schedule     []  Patient Will Schedule with External Provider               []          Colorectal Cancer Screening []  Colonoscopy Case Request or Referral Placed     []  External Records Requested     []  Added Reminder to Complete to Upcoming Primary Care Appt Notes     []  Patient Declined     []  Patient Will Call Back to Schedule     []  Patient Will Schedule with External Provider     []  Fit Kit Mailed (add the SmartPhrase under additional notes)     []  Reminded Patient to Complete Home Test             [x]      Diabetic Eye Exam []  Eye Camera Scheduled or Optometry Referral Placed     []  External Records Requested     []  Added Reminder to Complete to  Upcoming Primary Care Appt Notes     []  Patient Declined     []  Patient Will Call Back to Schedule     []  Patient Will Schedule with External Provider             []      Blood Pressure Control []  Primary Care Follow Up Visit Scheduled     []  Remote Blood Pressure Reading Captured     []  Added Reminder to Complete to Upcoming Primary Care Appt Notes     []  Patient Declined     []  Patient Will Call Back / Patient Will Send Portal Message with Reading     []  Patient Will Call Back to Schedule Provider Visit             []       HbA1c & Other Labs []  Lab Appt Scheduled for Due Labs     []  Primary Care Follow Up Visit Scheduled      []  Reminded Patient to Complete Home Test     []  Added Reminder to Complete to Upcoming Primary Care Appt Notes     []  Patient Declined     []  Patient Will Call Back to Schedule     []  Patient Will Schedule with External Provider / Order Routed if Applicable           []    Schedule Primary Care Appt []  Primary Care Appt Scheduled     []  Patient Declined     []  Patient Will Call Back to Schedule     []  Pt Established with External Provider & Updated Care Team             []      Medication Adherence []  Primary Care Appointment Scheduled     []  Added Reminder to Upcoming Primary Care Appt Notes     []  Patient Reminded to  Prescription     []  Patient Declined, Provider Notified if Needed     []  Sent Provider Message to Review and/or Add Exclusion to Problem List             []      Osteoporosis Screening []  DXA Appointment Scheduled     []  External Records Requested     []  Added Reminder to Complete to Upcoming Primary Care Appt Notes     []  Patient Declined     []  Patient Will Call Back to Schedule     []  Patient Will Schedule with External Provider / Order Routed if Applicable     Additional Care Coordinator Notes:         Further Action Needed If Patient Returns Outreach:

## 2023-06-20 ENCOUNTER — OFFICE VISIT (OUTPATIENT)
Dept: FAMILY MEDICINE | Facility: CLINIC | Age: 83
End: 2023-06-20
Payer: MEDICARE

## 2023-06-20 ENCOUNTER — LAB VISIT (OUTPATIENT)
Dept: LAB | Facility: HOSPITAL | Age: 83
End: 2023-06-20
Attending: STUDENT IN AN ORGANIZED HEALTH CARE EDUCATION/TRAINING PROGRAM
Payer: MEDICARE

## 2023-06-20 VITALS
HEIGHT: 68 IN | DIASTOLIC BLOOD PRESSURE: 84 MMHG | OXYGEN SATURATION: 96 % | SYSTOLIC BLOOD PRESSURE: 130 MMHG | WEIGHT: 241 LBS | BODY MASS INDEX: 36.53 KG/M2 | RESPIRATION RATE: 18 BRPM | HEART RATE: 86 BPM

## 2023-06-20 DIAGNOSIS — I48.20 CHRONIC ATRIAL FIBRILLATION: ICD-10-CM

## 2023-06-20 DIAGNOSIS — Z86.718 HISTORY OF DVT (DEEP VEIN THROMBOSIS): ICD-10-CM

## 2023-06-20 DIAGNOSIS — H61.23 BILATERAL IMPACTED CERUMEN: ICD-10-CM

## 2023-06-20 DIAGNOSIS — Z79.4 TYPE 2 DIABETES MELLITUS WITH DIABETIC NEUROPATHY, WITH LONG-TERM CURRENT USE OF INSULIN: ICD-10-CM

## 2023-06-20 DIAGNOSIS — E11.40 TYPE 2 DIABETES MELLITUS WITH DIABETIC NEUROPATHY, WITH LONG-TERM CURRENT USE OF INSULIN: ICD-10-CM

## 2023-06-20 DIAGNOSIS — Z23 NEED FOR PNEUMOCOCCAL 20-VALENT CONJUGATE VACCINATION: ICD-10-CM

## 2023-06-20 DIAGNOSIS — Z00.00 ROUTINE GENERAL MEDICAL EXAMINATION AT A HEALTH CARE FACILITY: Primary | ICD-10-CM

## 2023-06-20 DIAGNOSIS — G20.A1 PARKINSON DISEASE: ICD-10-CM

## 2023-06-20 DIAGNOSIS — E66.01 SEVERE OBESITY (BMI 35.0-39.9) WITH COMORBIDITY: ICD-10-CM

## 2023-06-20 LAB
BASOPHILS # BLD AUTO: 0.04 K/UL (ref 0–0.2)
BASOPHILS NFR BLD: 0.6 % (ref 0–1.9)
DIFFERENTIAL METHOD: NORMAL
EOSINOPHIL # BLD AUTO: 0.1 K/UL (ref 0–0.5)
EOSINOPHIL NFR BLD: 1.1 % (ref 0–8)
ERYTHROCYTE [DISTWIDTH] IN BLOOD BY AUTOMATED COUNT: 13.1 % (ref 11.5–14.5)
ESTIMATED AVG GLUCOSE: 146 MG/DL (ref 68–131)
HBA1C MFR BLD: 6.7 % (ref 4–5.6)
HCT VFR BLD AUTO: 41.6 % (ref 37–48.5)
HGB BLD-MCNC: 13.5 G/DL (ref 12–16)
IMM GRANULOCYTES # BLD AUTO: 0.01 K/UL (ref 0–0.04)
IMM GRANULOCYTES NFR BLD AUTO: 0.1 % (ref 0–0.5)
LYMPHOCYTES # BLD AUTO: 2.3 K/UL (ref 1–4.8)
LYMPHOCYTES NFR BLD: 32.4 % (ref 18–48)
MCH RBC QN AUTO: 29.6 PG (ref 27–31)
MCHC RBC AUTO-ENTMCNC: 32.5 G/DL (ref 32–36)
MCV RBC AUTO: 91 FL (ref 82–98)
MONOCYTES # BLD AUTO: 0.4 K/UL (ref 0.3–1)
MONOCYTES NFR BLD: 5.6 % (ref 4–15)
NEUTROPHILS # BLD AUTO: 4.3 K/UL (ref 1.8–7.7)
NEUTROPHILS NFR BLD: 60.2 % (ref 38–73)
NRBC BLD-RTO: 0 /100 WBC
PLATELET # BLD AUTO: 302 K/UL (ref 150–450)
PMV BLD AUTO: 10.4 FL (ref 9.2–12.9)
RBC # BLD AUTO: 4.56 M/UL (ref 4–5.4)
WBC # BLD AUTO: 7.09 K/UL (ref 3.9–12.7)

## 2023-06-20 PROCEDURE — 1101F PR PT FALLS ASSESS DOC 0-1 FALLS W/OUT INJ PAST YR: ICD-10-PCS | Mod: CPTII,S$GLB,, | Performed by: STUDENT IN AN ORGANIZED HEALTH CARE EDUCATION/TRAINING PROGRAM

## 2023-06-20 PROCEDURE — 99999 PR PBB SHADOW E&M-EST. PATIENT-LVL IV: ICD-10-PCS | Mod: PBBFAC,,, | Performed by: STUDENT IN AN ORGANIZED HEALTH CARE EDUCATION/TRAINING PROGRAM

## 2023-06-20 PROCEDURE — 80053 COMPREHEN METABOLIC PANEL: CPT | Performed by: STUDENT IN AN ORGANIZED HEALTH CARE EDUCATION/TRAINING PROGRAM

## 2023-06-20 PROCEDURE — 99999 PR PBB SHADOW E&M-EST. PATIENT-LVL IV: CPT | Mod: PBBFAC,,, | Performed by: STUDENT IN AN ORGANIZED HEALTH CARE EDUCATION/TRAINING PROGRAM

## 2023-06-20 PROCEDURE — G0009 ADMIN PNEUMOCOCCAL VACCINE: HCPCS | Mod: S$GLB,,, | Performed by: STUDENT IN AN ORGANIZED HEALTH CARE EDUCATION/TRAINING PROGRAM

## 2023-06-20 PROCEDURE — 1101F PT FALLS ASSESS-DOCD LE1/YR: CPT | Mod: CPTII,S$GLB,, | Performed by: STUDENT IN AN ORGANIZED HEALTH CARE EDUCATION/TRAINING PROGRAM

## 2023-06-20 PROCEDURE — 3288F FALL RISK ASSESSMENT DOCD: CPT | Mod: CPTII,S$GLB,, | Performed by: STUDENT IN AN ORGANIZED HEALTH CARE EDUCATION/TRAINING PROGRAM

## 2023-06-20 PROCEDURE — 85025 COMPLETE CBC W/AUTO DIFF WBC: CPT | Performed by: STUDENT IN AN ORGANIZED HEALTH CARE EDUCATION/TRAINING PROGRAM

## 2023-06-20 PROCEDURE — 3079F PR MOST RECENT DIASTOLIC BLOOD PRESSURE 80-89 MM HG: ICD-10-PCS | Mod: CPTII,S$GLB,, | Performed by: STUDENT IN AN ORGANIZED HEALTH CARE EDUCATION/TRAINING PROGRAM

## 2023-06-20 PROCEDURE — 36415 COLL VENOUS BLD VENIPUNCTURE: CPT | Mod: PO | Performed by: STUDENT IN AN ORGANIZED HEALTH CARE EDUCATION/TRAINING PROGRAM

## 2023-06-20 PROCEDURE — 1160F PR REVIEW ALL MEDS BY PRESCRIBER/CLIN PHARMACIST DOCUMENTED: ICD-10-PCS | Mod: CPTII,S$GLB,, | Performed by: STUDENT IN AN ORGANIZED HEALTH CARE EDUCATION/TRAINING PROGRAM

## 2023-06-20 PROCEDURE — 80061 LIPID PANEL: CPT | Performed by: STUDENT IN AN ORGANIZED HEALTH CARE EDUCATION/TRAINING PROGRAM

## 2023-06-20 PROCEDURE — 3288F PR FALLS RISK ASSESSMENT DOCUMENTED: ICD-10-PCS | Mod: CPTII,S$GLB,, | Performed by: STUDENT IN AN ORGANIZED HEALTH CARE EDUCATION/TRAINING PROGRAM

## 2023-06-20 PROCEDURE — 3079F DIAST BP 80-89 MM HG: CPT | Mod: CPTII,S$GLB,, | Performed by: STUDENT IN AN ORGANIZED HEALTH CARE EDUCATION/TRAINING PROGRAM

## 2023-06-20 PROCEDURE — 3075F PR MOST RECENT SYSTOLIC BLOOD PRESS GE 130-139MM HG: ICD-10-PCS | Mod: CPTII,S$GLB,, | Performed by: STUDENT IN AN ORGANIZED HEALTH CARE EDUCATION/TRAINING PROGRAM

## 2023-06-20 PROCEDURE — 99214 PR OFFICE/OUTPT VISIT, EST, LEVL IV, 30-39 MIN: ICD-10-PCS | Mod: S$GLB,,, | Performed by: STUDENT IN AN ORGANIZED HEALTH CARE EDUCATION/TRAINING PROGRAM

## 2023-06-20 PROCEDURE — 1126F AMNT PAIN NOTED NONE PRSNT: CPT | Mod: CPTII,S$GLB,, | Performed by: STUDENT IN AN ORGANIZED HEALTH CARE EDUCATION/TRAINING PROGRAM

## 2023-06-20 PROCEDURE — 1160F RVW MEDS BY RX/DR IN RCRD: CPT | Mod: CPTII,S$GLB,, | Performed by: STUDENT IN AN ORGANIZED HEALTH CARE EDUCATION/TRAINING PROGRAM

## 2023-06-20 PROCEDURE — 1126F PR PAIN SEVERITY QUANTIFIED, NO PAIN PRESENT: ICD-10-PCS | Mod: CPTII,S$GLB,, | Performed by: STUDENT IN AN ORGANIZED HEALTH CARE EDUCATION/TRAINING PROGRAM

## 2023-06-20 PROCEDURE — 1159F MED LIST DOCD IN RCRD: CPT | Mod: CPTII,S$GLB,, | Performed by: STUDENT IN AN ORGANIZED HEALTH CARE EDUCATION/TRAINING PROGRAM

## 2023-06-20 PROCEDURE — 1159F PR MEDICATION LIST DOCUMENTED IN MEDICAL RECORD: ICD-10-PCS | Mod: CPTII,S$GLB,, | Performed by: STUDENT IN AN ORGANIZED HEALTH CARE EDUCATION/TRAINING PROGRAM

## 2023-06-20 PROCEDURE — 90677 PNEUMOCOCCAL CONJUGATE VACCINE 20-VALENT: ICD-10-PCS | Mod: S$GLB,,, | Performed by: STUDENT IN AN ORGANIZED HEALTH CARE EDUCATION/TRAINING PROGRAM

## 2023-06-20 PROCEDURE — 3075F SYST BP GE 130 - 139MM HG: CPT | Mod: CPTII,S$GLB,, | Performed by: STUDENT IN AN ORGANIZED HEALTH CARE EDUCATION/TRAINING PROGRAM

## 2023-06-20 PROCEDURE — G0009 PNEUMOCOCCAL CONJUGATE VACCINE 20-VALENT: ICD-10-PCS | Mod: S$GLB,,, | Performed by: STUDENT IN AN ORGANIZED HEALTH CARE EDUCATION/TRAINING PROGRAM

## 2023-06-20 PROCEDURE — 99214 OFFICE O/P EST MOD 30 MIN: CPT | Mod: S$GLB,,, | Performed by: STUDENT IN AN ORGANIZED HEALTH CARE EDUCATION/TRAINING PROGRAM

## 2023-06-20 PROCEDURE — 90677 PCV20 VACCINE IM: CPT | Mod: S$GLB,,, | Performed by: STUDENT IN AN ORGANIZED HEALTH CARE EDUCATION/TRAINING PROGRAM

## 2023-06-20 PROCEDURE — 83036 HEMOGLOBIN GLYCOSYLATED A1C: CPT | Performed by: STUDENT IN AN ORGANIZED HEALTH CARE EDUCATION/TRAINING PROGRAM

## 2023-06-20 NOTE — PROGRESS NOTES
"[facility] CLINIC NOTE    Patient Name: Radha Alas  YOB: 1940    PRESENTING HISTORY     History of Present Illness:  Ms. Radha Alas is a 82 y.o. female here for routine f/u.     Well controlled HTN    Well controlled DM2- runs highest 180s. Lowest 80s once. 90s this morning.   Insulin somewhat titrated by family.     Does not follow with neurology for PD.   Recommend she establish to consider alternate treatments.     C/o decreased hearing.     ROS      OBJECTIVE:   Vital Signs:  Vitals:    06/20/23 1340   BP: 130/84   Pulse: 86   Resp: 18   SpO2: 96%   Weight: 109.3 kg (241 lb)   Height: 5' 8" (1.727 m)            Physical Exam  Vitals and nursing note reviewed.   Constitutional:       Appearance: She is not diaphoretic.   HENT:      Head: Normocephalic and atraumatic.      Right Ear: External ear normal. There is impacted cerumen.      Left Ear: External ear normal. There is impacted cerumen.   Eyes:      General: No scleral icterus.     Conjunctiva/sclera: Conjunctivae normal.      Pupils: Pupils are equal, round, and reactive to light.   Neck:      Thyroid: No thyromegaly.   Cardiovascular:      Rate and Rhythm: Normal rate and regular rhythm.      Heart sounds: Normal heart sounds. No murmur heard.  Pulmonary:      Effort: Pulmonary effort is normal. No respiratory distress.      Breath sounds: Normal breath sounds. No wheezing or rales.   Musculoskeletal:         General: No tenderness or deformity. Normal range of motion.      Cervical back: Normal range of motion and neck supple.   Lymphadenopathy:      Cervical: No cervical adenopathy.   Skin:     General: Skin is warm and dry.      Findings: No erythema or rash.   Neurological:      Mental Status: She is alert.      Gait: Gait is intact.      Comments: Hypertonic throughout with cogwheeling.    Psychiatric:         Mood and Affect: Mood and affect normal.         Cognition and Memory: Memory normal.         Judgment: " Judgment normal.       ASSESSMENT & PLAN:     Routine general medical examination at a health care facility    History of DVT (deep vein thrombosis)  -     CBC Auto Differential; Future; Expected date: 06/20/2023    Type 2 diabetes mellitus with diabetic neuropathy, with long-term current use of insulin  -     Hemoglobin A1C; Future; Expected date: 06/20/2023  -     Comprehensive Metabolic Panel; Future; Expected date: 06/20/2023  -     CBC Auto Differential; Future; Expected date: 06/20/2023  -     Lipid Panel; Future; Expected date: 06/20/2023    Need for pneumococcal 20-valent conjugate vaccination  -     (In Office Administered) Pneumococcal Conjugate Vaccine (20 Valent) (IM)    Bilateral impacted cerumen    Severe obesity (BMI 35.0-39.9) with comorbidity    Parkinson disease    Chronic atrial fibrillation             Prabhu Clark MD   Internal Medicine

## 2023-06-21 LAB
ALBUMIN SERPL BCP-MCNC: 3.3 G/DL (ref 3.5–5.2)
ALP SERPL-CCNC: 84 U/L (ref 55–135)
ALT SERPL W/O P-5'-P-CCNC: 19 U/L (ref 10–44)
ANION GAP SERPL CALC-SCNC: 12 MMOL/L (ref 8–16)
AST SERPL-CCNC: 20 U/L (ref 10–40)
BILIRUB SERPL-MCNC: 0.5 MG/DL (ref 0.1–1)
BUN SERPL-MCNC: 13 MG/DL (ref 8–23)
CALCIUM SERPL-MCNC: 9.6 MG/DL (ref 8.7–10.5)
CHLORIDE SERPL-SCNC: 105 MMOL/L (ref 95–110)
CHOLEST SERPL-MCNC: 173 MG/DL (ref 120–199)
CHOLEST/HDLC SERPL: 2.9 {RATIO} (ref 2–5)
CO2 SERPL-SCNC: 22 MMOL/L (ref 23–29)
CREAT SERPL-MCNC: 0.8 MG/DL (ref 0.5–1.4)
EST. GFR  (NO RACE VARIABLE): >60 ML/MIN/1.73 M^2
GLUCOSE SERPL-MCNC: 181 MG/DL (ref 70–110)
HDLC SERPL-MCNC: 60 MG/DL (ref 40–75)
HDLC SERPL: 34.7 % (ref 20–50)
LDLC SERPL CALC-MCNC: 87 MG/DL (ref 63–159)
NONHDLC SERPL-MCNC: 113 MG/DL
POTASSIUM SERPL-SCNC: 4.2 MMOL/L (ref 3.5–5.1)
PROT SERPL-MCNC: 7.4 G/DL (ref 6–8.4)
SODIUM SERPL-SCNC: 139 MMOL/L (ref 136–145)
TRIGL SERPL-MCNC: 130 MG/DL (ref 30–150)

## 2023-07-20 DIAGNOSIS — E11.59 HYPERTENSION ASSOCIATED WITH DIABETES: ICD-10-CM

## 2023-07-20 DIAGNOSIS — E11.3393 CONTROLLED TYPE 2 DIABETES MELLITUS WITH BOTH EYES AFFECTED BY MODERATE NONPROLIFERATIVE RETINOPATHY WITHOUT MACULAR EDEMA, WITH LONG-TERM CURRENT USE OF INSULIN: ICD-10-CM

## 2023-07-20 DIAGNOSIS — Z79.4 CONTROLLED TYPE 2 DIABETES MELLITUS WITH BOTH EYES AFFECTED BY MODERATE NONPROLIFERATIVE RETINOPATHY WITHOUT MACULAR EDEMA, WITH LONG-TERM CURRENT USE OF INSULIN: ICD-10-CM

## 2023-07-20 DIAGNOSIS — I15.2 HYPERTENSION ASSOCIATED WITH DIABETES: ICD-10-CM

## 2023-07-20 RX ORDER — LISINOPRIL 20 MG/1
TABLET ORAL
Qty: 90 TABLET | Refills: 3 | Status: SHIPPED | OUTPATIENT
Start: 2023-07-20 | End: 2024-03-28

## 2023-07-20 NOTE — TELEPHONE ENCOUNTER
Refill Decision Note   Radha Alas  is requesting a refill authorization.  Brief Assessment and Rationale for Refill:  Approve     Medication Therapy Plan:  Rx sent to requested pharmacy since listed as preferred pharmacy    Medication Reconciliation Completed: No   Comments:     No Care Gaps recommended.     Note composed:5:00 PM 07/20/2023

## 2023-07-20 NOTE — TELEPHONE ENCOUNTER
No care due was identified.  [facility] Embedded Care Due Messages. Reference number: [id number].   7/20/2023 3:02:43 PM CDT

## 2023-07-26 DIAGNOSIS — E11.3393 CONTROLLED TYPE 2 DIABETES MELLITUS WITH BOTH EYES AFFECTED BY MODERATE NONPROLIFERATIVE RETINOPATHY WITHOUT MACULAR EDEMA, WITH LONG-TERM CURRENT USE OF INSULIN: ICD-10-CM

## 2023-07-26 DIAGNOSIS — Z79.4 CONTROLLED TYPE 2 DIABETES MELLITUS WITH BOTH EYES AFFECTED BY MODERATE NONPROLIFERATIVE RETINOPATHY WITHOUT MACULAR EDEMA, WITH LONG-TERM CURRENT USE OF INSULIN: ICD-10-CM

## 2023-07-26 RX ORDER — INSULIN DETEMIR 100 [IU]/ML
INJECTION, SOLUTION SUBCUTANEOUS
Qty: 30 ML | Refills: 1 | Status: SHIPPED | OUTPATIENT
Start: 2023-07-26 | End: 2023-12-20

## 2023-07-26 RX ORDER — CALCIUM CITRATE/VITAMIN D3 200MG-6.25
TABLET ORAL
Qty: 300 STRIP | Refills: 3 | Status: SHIPPED | OUTPATIENT
Start: 2023-07-26 | End: 2024-03-28

## 2023-07-26 RX ORDER — DILTIAZEM HYDROCHLORIDE 120 MG/1
CAPSULE, COATED, EXTENDED RELEASE ORAL
Qty: 90 CAPSULE | Refills: 3 | Status: SHIPPED | OUTPATIENT
Start: 2023-07-26 | End: 2024-03-28

## 2023-07-26 NOTE — TELEPHONE ENCOUNTER
Refill Decision Note   Radha Alas  is requesting a refill authorization.  Brief Assessment and Rationale for Refill:  Approve     Medication Therapy Plan:         Comments:     Note composed:6:53 PM 07/26/2023

## 2023-07-26 NOTE — TELEPHONE ENCOUNTER
No care due was identified.  [facility] Embedded Care Due Messages. Reference number: [id number].   7/26/2023 3:07:09 PM CDT

## 2023-09-14 RX ORDER — BUSPIRONE HYDROCHLORIDE 7.5 MG/1
7.5 TABLET ORAL 3 TIMES DAILY
Qty: 90 TABLET | Refills: 11 | Status: SHIPPED | OUTPATIENT
Start: 2023-09-14 | End: 2023-12-20

## 2023-09-15 ENCOUNTER — PATIENT MESSAGE (OUTPATIENT)
Dept: FAMILY MEDICINE | Facility: CLINIC | Age: 83
End: 2023-09-15
Payer: MEDICARE

## 2023-09-15 DIAGNOSIS — F41.9 ANXIETY: Primary | ICD-10-CM

## 2023-12-18 DIAGNOSIS — Z79.4 CONTROLLED TYPE 2 DIABETES MELLITUS WITH BOTH EYES AFFECTED BY MODERATE NONPROLIFERATIVE RETINOPATHY WITHOUT MACULAR EDEMA, WITH LONG-TERM CURRENT USE OF INSULIN: ICD-10-CM

## 2023-12-18 DIAGNOSIS — E11.3393 CONTROLLED TYPE 2 DIABETES MELLITUS WITH BOTH EYES AFFECTED BY MODERATE NONPROLIFERATIVE RETINOPATHY WITHOUT MACULAR EDEMA, WITH LONG-TERM CURRENT USE OF INSULIN: ICD-10-CM

## 2023-12-19 NOTE — TELEPHONE ENCOUNTER
Care Due:                  Date            Visit Type   Department     Provider  --------------------------------------------------------------------------------                                EP -                              PRIMARY      [facility]    Prabhu De Santiago  Last Visit: 06-      CARE ([facility])   MEDICINE       Eduardo  Next Visit: None Scheduled  None         None Found                                                            Last  Test          Frequency    Reason                     Performed    Due Date  --------------------------------------------------------------------------------    HBA1C.......  6 months...  LEVEMIR, glimepiride.....  06- 12-    [facility] Embedded Care Due Messages. Reference number: [id number].   12/18/2023 7:55:02 PM CST

## 2023-12-19 NOTE — TELEPHONE ENCOUNTER
Refill Routing Note   Medication(s) are not appropriate for processing by [facility] for the following reason(s):        Required labs outdated    [facility] action(s):  Defer     Requires labs : Yes             Appointments  past 12m or future 3m with PCP    Date Provider   Last Visit   6/20/2023 Prabhu Clark MD   Next Visit   12/20/2023 Prabhu Clark MD   ED visits in past 90 days: 0        Note composed:7:01 AM 12/19/2023

## 2023-12-20 ENCOUNTER — OFFICE VISIT (OUTPATIENT)
Dept: FAMILY MEDICINE | Facility: CLINIC | Age: 83
End: 2023-12-20
Payer: MEDICARE

## 2023-12-20 ENCOUNTER — TELEPHONE (OUTPATIENT)
Dept: FAMILY MEDICINE | Facility: CLINIC | Age: 83
End: 2023-12-20
Payer: MEDICARE

## 2023-12-20 DIAGNOSIS — G20.A1 PARKINSON'S DISEASE, UNSPECIFIED WHETHER DYSKINESIA PRESENT, UNSPECIFIED WHETHER MANIFESTATIONS FLUCTUATE: ICD-10-CM

## 2023-12-20 DIAGNOSIS — Z79.4 TYPE 2 DIABETES MELLITUS WITH DIABETIC NEUROPATHY, WITH LONG-TERM CURRENT USE OF INSULIN: Primary | ICD-10-CM

## 2023-12-20 DIAGNOSIS — I15.2 HYPERTENSION ASSOCIATED WITH DIABETES: ICD-10-CM

## 2023-12-20 DIAGNOSIS — E11.59 HYPERTENSION ASSOCIATED WITH DIABETES: ICD-10-CM

## 2023-12-20 DIAGNOSIS — E11.40 TYPE 2 DIABETES MELLITUS WITH DIABETIC NEUROPATHY, WITH LONG-TERM CURRENT USE OF INSULIN: Primary | ICD-10-CM

## 2023-12-20 DIAGNOSIS — I48.20 CHRONIC ATRIAL FIBRILLATION: ICD-10-CM

## 2023-12-20 PROCEDURE — 2023F PR DILATED RETINAL EXAM W/O EVID OF RETINOPATHY: ICD-10-PCS | Mod: CPTII,95,, | Performed by: STUDENT IN AN ORGANIZED HEALTH CARE EDUCATION/TRAINING PROGRAM

## 2023-12-20 PROCEDURE — 1159F MED LIST DOCD IN RCRD: CPT | Mod: CPTII,95,, | Performed by: STUDENT IN AN ORGANIZED HEALTH CARE EDUCATION/TRAINING PROGRAM

## 2023-12-20 PROCEDURE — 2023F DILAT RTA XM W/O RTNOPTHY: CPT | Mod: CPTII,95,, | Performed by: STUDENT IN AN ORGANIZED HEALTH CARE EDUCATION/TRAINING PROGRAM

## 2023-12-20 PROCEDURE — 1160F PR REVIEW ALL MEDS BY PRESCRIBER/CLIN PHARMACIST DOCUMENTED: ICD-10-PCS | Mod: CPTII,95,, | Performed by: STUDENT IN AN ORGANIZED HEALTH CARE EDUCATION/TRAINING PROGRAM

## 2023-12-20 PROCEDURE — 1160F RVW MEDS BY RX/DR IN RCRD: CPT | Mod: CPTII,95,, | Performed by: STUDENT IN AN ORGANIZED HEALTH CARE EDUCATION/TRAINING PROGRAM

## 2023-12-20 PROCEDURE — 99214 PR OFFICE/OUTPT VISIT, EST, LEVL IV, 30-39 MIN: ICD-10-PCS | Mod: 95,,, | Performed by: STUDENT IN AN ORGANIZED HEALTH CARE EDUCATION/TRAINING PROGRAM

## 2023-12-20 PROCEDURE — 1159F PR MEDICATION LIST DOCUMENTED IN MEDICAL RECORD: ICD-10-PCS | Mod: CPTII,95,, | Performed by: STUDENT IN AN ORGANIZED HEALTH CARE EDUCATION/TRAINING PROGRAM

## 2023-12-20 PROCEDURE — 99214 OFFICE O/P EST MOD 30 MIN: CPT | Mod: 95,,, | Performed by: STUDENT IN AN ORGANIZED HEALTH CARE EDUCATION/TRAINING PROGRAM

## 2023-12-20 RX ORDER — INSULIN DETEMIR 100 [IU]/ML
INJECTION, SOLUTION SUBCUTANEOUS
Qty: 30 ML | Refills: 0 | Status: SHIPPED | OUTPATIENT
Start: 2023-12-20

## 2023-12-20 NOTE — TELEPHONE ENCOUNTER
I spoke with pt son via phone as a follow up call from the virtual visit today with Dr. Clark.     Per Dr. Clark :      Visit Disposition    Dispositions Check-out Note  · Follow up in about 6 months (around 6/20/2024). Blood at some point.    6 mo f/u in person      Pts son states that he will call back at a later time to book lab and f/u appt.

## 2023-12-20 NOTE — PROGRESS NOTES
The patient location is: [place], [place]  The chief complaint leading to consultation is: Follow Up    Visit type: audiovisual    Face to Face time with patient: 13 minutes  17 minutes of total time spent on the encounter, which includes face to face time and non-face to face time preparing to see the patient (eg, review of tests), Obtaining and/or reviewing separately obtained history, Documenting clinical information in the electronic or other health record, Independently interpreting results (not separately reported) and communicating results to the patient/family/caregiver, or Care coordination (not separately reported).         Each patient to whom he or she provides medical services by telemedicine is:  (1) informed of the relationship between the physician and patient and the respective role of any other health care provider with respect to management of the patient; and (2) notified that he or she may decline to receive medical services by telemedicine and may withdraw from such care at any time.    Notes:      [facility] MEDICINE CLINIC NOTE    Patient Name: Radha Alas  YOB: 1940    PRESENTING HISTORY     History of Present Illness:  Ms. Radha Alas is a 83 y.o. female here for routine f/u.     She is unable to hear over the video.  who is caretaker provides most of history.     PD- on MAOi   Tried carbidopa in past. Got memory loss, confusion   Does not see Neurology and I strongly recommended they start. Will pursue at [facility].      pAfib- on diltiazem and AC    Hx VTE- on AC.     T2DM- 98 this morning.    79 once.    Runs between .     titrates medication.    Levemir is on a sliding scale starting at 133 or above.   140-5 units.   200- 25-30 units   On sliding scale insulin. Unclear how adusted, he does this based on experience rather than set readings.     ROS      OBJECTIVE:   Vital Signs:  There were no vitals filed for this visit.       Physical Exam:  Expressionless face. Rigid posture.     Physical Exam    ASSESSMENT & PLAN:     Type 2 diabetes mellitus with diabetic neuropathy, with long-term current use of insulin  -     CBC W/ AUTO DIFFERENTIAL; Future; Expected date: 12/20/2023  -     COMPREHENSIVE METABOLIC PANEL; Future; Expected date: 12/20/2023  -     HEMOGLOBIN A1C; Future; Expected date: 12/20/2023  -     Microalbumin/Creatinine Ratio, Urine; Future; Expected date: 12/20/2023  -     TSH; Future; Expected date: 12/20/2023  Stable, continue current medications      Parkinson's disease, unspecified whether dyskinesia present, unspecified whether manifestations fluctuate  -     CBC W/ AUTO DIFFERENTIAL; Future; Expected date: 12/20/2023  -     COMPREHENSIVE METABOLIC PANEL; Future; Expected date: 12/20/2023  -     HEMOGLOBIN A1C; Future; Expected date: 12/20/2023  -     Microalbumin/Creatinine Ratio, Urine; Future; Expected date: 12/20/2023  -     TSH; Future; Expected date: 12/20/2023  See discussion above.     Chronic atrial fibrillation  Stable, continue current medications    Hypertension associated with diabetes  Stable, continue current medications             Prabhu Clark  Internal Medicine

## 2024-01-31 RX ORDER — GLIMEPIRIDE 4 MG/1
4 TABLET ORAL
Qty: 90 TABLET | Refills: 4 | Status: SHIPPED | OUTPATIENT
Start: 2024-01-31

## 2024-01-31 NOTE — TELEPHONE ENCOUNTER
Refill Routing Note   Medication(s) are not appropriate for processing by [facility] for the following reason(s):        Required labs outdated    [facility] action(s):  Defer               Appointments  past 12m or future 3m with PCP    Date Provider   Last Visit   12/20/2023 Prabhu Clark MD   Next Visit   Visit date not found Prabhu Clark MD   ED visits in past 90 days: 0        Note composed:8:52 PM 01/30/2024

## 2024-01-31 NOTE — TELEPHONE ENCOUNTER
No care due was identified.  [facility] Embedded Care Due Messages. Reference number: [id number].   1/30/2024 8:29:54 PM CST

## 2024-02-07 ENCOUNTER — PATIENT MESSAGE (OUTPATIENT)
Dept: RESEARCH | Facility: HOSPITAL | Age: 84
End: 2024-02-07
Payer: MEDICARE

## 2024-03-04 RX ORDER — APIXABAN 5 MG/1
TABLET, FILM COATED ORAL
Qty: 180 TABLET | Refills: 3 | Status: SHIPPED | OUTPATIENT
Start: 2024-03-04

## 2024-03-27 DIAGNOSIS — E11.59 HYPERTENSION ASSOCIATED WITH DIABETES: ICD-10-CM

## 2024-03-27 DIAGNOSIS — Z79.4 CONTROLLED TYPE 2 DIABETES MELLITUS WITH BOTH EYES AFFECTED BY MODERATE NONPROLIFERATIVE RETINOPATHY WITHOUT MACULAR EDEMA, WITH LONG-TERM CURRENT USE OF INSULIN: ICD-10-CM

## 2024-03-27 DIAGNOSIS — I15.2 HYPERTENSION ASSOCIATED WITH DIABETES: ICD-10-CM

## 2024-03-27 DIAGNOSIS — E11.3393 CONTROLLED TYPE 2 DIABETES MELLITUS WITH BOTH EYES AFFECTED BY MODERATE NONPROLIFERATIVE RETINOPATHY WITHOUT MACULAR EDEMA, WITH LONG-TERM CURRENT USE OF INSULIN: ICD-10-CM

## 2024-03-27 NOTE — TELEPHONE ENCOUNTER
Care Due:                  Date            Visit Type   Department     Provider  --------------------------------------------------------------------------------                                ESTABLISHED                              PATIENT -    [facility]    Prabhu De Santiago  Last Visit: 12-      [facility]  Next Visit: None Scheduled  None         None Found                                                            Last  Test          Frequency    Reason                     Performed    Due Date  --------------------------------------------------------------------------------    CMP.........  12 months..  LEVEMIR, glimepiride,      06-   06-                             lisinopriL...............    HBA1C.......  6 months...  LEVEMIR, glimepiride.....  06- 12-    [facility] Embedded Care Due Messages. Reference number: [id number].   3/27/2024 1:43:15 PM CDT

## 2024-03-28 RX ORDER — CALCIUM CITRATE/VITAMIN D3 200MG-6.25
TABLET ORAL
Qty: 300 STRIP | Refills: 3 | Status: SHIPPED | OUTPATIENT
Start: 2024-03-28

## 2024-03-28 RX ORDER — PANTOPRAZOLE SODIUM 40 MG/1
40 TABLET, DELAYED RELEASE ORAL
Qty: 90 TABLET | Refills: 3 | Status: SHIPPED | OUTPATIENT
Start: 2024-03-28

## 2024-03-28 RX ORDER — DILTIAZEM HYDROCHLORIDE 120 MG/1
CAPSULE, COATED, EXTENDED RELEASE ORAL
Qty: 90 CAPSULE | Refills: 0 | Status: SHIPPED | OUTPATIENT
Start: 2024-03-28

## 2024-03-28 RX ORDER — LISINOPRIL 20 MG/1
TABLET ORAL
Qty: 90 TABLET | Refills: 0 | Status: SHIPPED | OUTPATIENT
Start: 2024-03-28

## 2024-03-28 NOTE — TELEPHONE ENCOUNTER
Provider Staff:  Action required for this patient    Requires labs      Please see care gap opportunities below in Care Due Message.    Thanks!  [facility]     Appointments      Date Provider   Last Visit   12/20/2023 Prabhu Clark MD   Next Visit   Visit date not found Prabhu Clark MD     Refill Decision Note   Radha Alas  is requesting a refill authorization.  Brief Assessment and Rationale for Refill:  Approve     Medication Therapy Plan:         Comments:     Note composed:8:14 AM 03/28/2024

## 2024-04-15 DIAGNOSIS — G20.A1 PARKINSON DISEASE: ICD-10-CM

## 2024-04-15 RX ORDER — PEN NEEDLE, DIABETIC 31 GX3/16"
NEEDLE, DISPOSABLE MISCELLANEOUS
Qty: 100 EACH | Refills: 3 | Status: SHIPPED | OUTPATIENT
Start: 2024-04-15

## 2024-04-15 NOTE — TELEPHONE ENCOUNTER
Refill Routing Note   Medication(s) are not appropriate for processing by [facility] for the following reason(s):        Outside of protocol    [facility] action(s):  Route  Approve               Appointments  past 12m or future 3m with PCP    Date Provider   Last Visit   12/20/2023 Prabhu Clark MD   Next Visit   Visit date not found Prabhu Clark MD   ED visits in past 90 days: 0        Note composed:6:31 PM 04/15/2024

## 2024-04-15 NOTE — TELEPHONE ENCOUNTER
No care due was identified.  [facility] Embedded Care Due Messages. Reference number: [id number].   4/15/2024 4:54:29 PM CDT

## 2024-04-18 RX ORDER — SELEGILINE HYDROCHLORIDE 5 MG/1
CAPSULE ORAL
Qty: 180 CAPSULE | Refills: 3 | Status: SHIPPED | OUTPATIENT
Start: 2024-04-18

## 2024-04-30 ENCOUNTER — TELEPHONE (OUTPATIENT)
Dept: CARDIOLOGY | Facility: CLINIC | Age: 84
End: 2024-04-30
Payer: MEDICARE

## 2024-04-30 NOTE — LETTER
"     2024    Radha Alas  [address] [place] [address]             [facility]  [address]  [address]  [place] [address]  Phone: [phone]  Fax: [phone] Patient: Radha Alas  : 1940  Referring Doctor: DR. BOWLING  PROCEDURE: DENTAL EXTRACTIONS    Current Outpatient Medications   Medication Sig Dispense Refill    ACETAMINOPHEN (TYLENOL ARTHRITIS ORAL) Take by mouth.      diltiaZEM (CARDIZEM CD) 120 MG Cp24 TAKE 1 CAPSULE EVERY EVENING 90 capsule 0    DROPLET PEN NEEDLE 31 gauge x 3/16" Ndle USE TO ADMINISTER INSULIN ONE TIME DAILY 100 each 3    ELIQUIS 5 mg Tab TAKE 1 TABLET TWICE DAILY 180 tablet 3    glimepiride (AMARYL) 4 MG tablet TAKE 1 TABLET (4 MG TOTAL) BY MOUTH BEFORE BREAKFAST. 90 tablet 4    LEVEMIR FLEXPEN 100 unit/mL (3 mL) InPn pen INJECT 20 UNITS INTO THE SKIN EVERY EVENING. 30 mL 0    lisinopriL (PRINIVIL,ZESTRIL) 20 MG tablet TAKE 1 TABLET EVERY DAY 90 tablet 0    pantoprazole (PROTONIX) 40 MG tablet TAKE 1 TABLET ONE TIME DAILY 90 tablet 3    polyethylene glycol (GLYCOLAX) 17 gram/dose powder Take 17 g by mouth daily as needed.      selegiline (ELDEPRYL) 5 mg Cap TAKE 1 CAPSULE TWICE DAILY 180 capsule 3    TRUE METRIX GLUCOSE TEST STRIP Strp TEST GLUCOSE LEVELS TWO TO THREE TIMES DAILY FOR DIABETES 300 strip 3     No current facility-administered medications for this visit.       This patient has been assessed for risk factors for clearance of surgery with the following stipulations:    ___ No contraindications  ___ Recommendations for ELIQUIS, HOLD X __ DAYS  ___ LOW RISK __ MODERATE RISK __ HIGH RISK       If you have any questions regarding the above, please contact my office at [phone].    Sincerely,           "

## 2024-04-30 NOTE — TELEPHONE ENCOUNTER
----- Message from Irvin Resendiz sent at 4/30/2024  9:43 AM CDT -----  Regarding: clearance  Contact: Mary at [phone]  Type: Needs Medical Advice    Who Called:  Mary with Dr Prabhu Cheney's Office    Best Call Back Number: [phone]    Additional Information: clearance faxed 4/16/24 not received back yet. Please call to discuss b/c pt surgery on 5/2/24

## 2024-05-22 ENCOUNTER — PATIENT MESSAGE (OUTPATIENT)
Dept: NEUROLOGY | Facility: CLINIC | Age: 84
End: 2024-05-22
Payer: MEDICARE

## 2024-07-16 ENCOUNTER — OFFICE VISIT (OUTPATIENT)
Dept: FAMILY MEDICINE | Facility: CLINIC | Age: 84
End: 2024-07-16
Payer: MEDICARE

## 2024-07-16 ENCOUNTER — LAB VISIT (OUTPATIENT)
Dept: LAB | Facility: HOSPITAL | Age: 84
End: 2024-07-16
Attending: STUDENT IN AN ORGANIZED HEALTH CARE EDUCATION/TRAINING PROGRAM
Payer: MEDICARE

## 2024-07-16 VITALS
HEART RATE: 79 BPM | HEIGHT: 68 IN | OXYGEN SATURATION: 94 % | WEIGHT: 240.75 LBS | DIASTOLIC BLOOD PRESSURE: 82 MMHG | SYSTOLIC BLOOD PRESSURE: 130 MMHG | RESPIRATION RATE: 16 BRPM | BODY MASS INDEX: 36.49 KG/M2

## 2024-07-16 DIAGNOSIS — F41.9 ANXIETY: ICD-10-CM

## 2024-07-16 DIAGNOSIS — E11.69 HYPERLIPIDEMIA ASSOCIATED WITH TYPE 2 DIABETES MELLITUS: ICD-10-CM

## 2024-07-16 DIAGNOSIS — E66.01 SEVERE OBESITY (BMI 35.0-39.9) WITH COMORBIDITY: ICD-10-CM

## 2024-07-16 DIAGNOSIS — R05.1 ACUTE COUGH: ICD-10-CM

## 2024-07-16 DIAGNOSIS — I15.2 HYPERTENSION ASSOCIATED WITH DIABETES: ICD-10-CM

## 2024-07-16 DIAGNOSIS — G20.A1 PARKINSON'S DISEASE, UNSPECIFIED WHETHER DYSKINESIA PRESENT, UNSPECIFIED WHETHER MANIFESTATIONS FLUCTUATE: ICD-10-CM

## 2024-07-16 DIAGNOSIS — I48.20 CHRONIC ATRIAL FIBRILLATION: ICD-10-CM

## 2024-07-16 DIAGNOSIS — Z00.00 ROUTINE GENERAL MEDICAL EXAMINATION AT HEALTH CARE FACILITY: Primary | ICD-10-CM

## 2024-07-16 DIAGNOSIS — E11.3393 CONTROLLED TYPE 2 DIABETES MELLITUS WITH BOTH EYES AFFECTED BY MODERATE NONPROLIFERATIVE RETINOPATHY WITHOUT MACULAR EDEMA, WITH LONG-TERM CURRENT USE OF INSULIN: ICD-10-CM

## 2024-07-16 DIAGNOSIS — Z79.4 TYPE 2 DIABETES MELLITUS WITH DIABETIC NEUROPATHY, WITH LONG-TERM CURRENT USE OF INSULIN: ICD-10-CM

## 2024-07-16 DIAGNOSIS — E78.5 HYPERLIPIDEMIA ASSOCIATED WITH TYPE 2 DIABETES MELLITUS: ICD-10-CM

## 2024-07-16 DIAGNOSIS — E11.59 HYPERTENSION ASSOCIATED WITH DIABETES: ICD-10-CM

## 2024-07-16 DIAGNOSIS — Z79.4 CONTROLLED TYPE 2 DIABETES MELLITUS WITH BOTH EYES AFFECTED BY MODERATE NONPROLIFERATIVE RETINOPATHY WITHOUT MACULAR EDEMA, WITH LONG-TERM CURRENT USE OF INSULIN: ICD-10-CM

## 2024-07-16 DIAGNOSIS — E11.40 TYPE 2 DIABETES MELLITUS WITH DIABETIC NEUROPATHY, WITH LONG-TERM CURRENT USE OF INSULIN: ICD-10-CM

## 2024-07-16 LAB
ALBUMIN SERPL BCP-MCNC: 3.2 G/DL (ref 3.5–5.2)
ALP SERPL-CCNC: 78 U/L (ref 55–135)
ALT SERPL W/O P-5'-P-CCNC: 13 U/L (ref 10–44)
ANION GAP SERPL CALC-SCNC: 13 MMOL/L (ref 8–16)
AST SERPL-CCNC: 16 U/L (ref 10–40)
BASOPHILS # BLD AUTO: 0.04 K/UL (ref 0–0.2)
BASOPHILS NFR BLD: 0.5 % (ref 0–1.9)
BILIRUB SERPL-MCNC: 0.5 MG/DL (ref 0.1–1)
BUN SERPL-MCNC: 13 MG/DL (ref 8–23)
CALCIUM SERPL-MCNC: 9.1 MG/DL (ref 8.7–10.5)
CHLORIDE SERPL-SCNC: 105 MMOL/L (ref 95–110)
CHOLEST SERPL-MCNC: 170 MG/DL (ref 120–199)
CHOLEST/HDLC SERPL: 3.1 {RATIO} (ref 2–5)
CO2 SERPL-SCNC: 20 MMOL/L (ref 23–29)
CREAT SERPL-MCNC: 0.9 MG/DL (ref 0.5–1.4)
DIFFERENTIAL METHOD BLD: ABNORMAL
EOSINOPHIL # BLD AUTO: 0.1 K/UL (ref 0–0.5)
EOSINOPHIL NFR BLD: 0.9 % (ref 0–8)
ERYTHROCYTE [DISTWIDTH] IN BLOOD BY AUTOMATED COUNT: 13.7 % (ref 11.5–14.5)
EST. GFR  (NO RACE VARIABLE): >60 ML/MIN/1.73 M^2
ESTIMATED AVG GLUCOSE: 146 MG/DL (ref 68–131)
GLUCOSE SERPL-MCNC: 213 MG/DL (ref 70–110)
HBA1C MFR BLD: 6.7 % (ref 4–5.6)
HCT VFR BLD AUTO: 43.6 % (ref 37–48.5)
HDLC SERPL-MCNC: 55 MG/DL (ref 40–75)
HDLC SERPL: 32.4 % (ref 20–50)
HGB BLD-MCNC: 13.5 G/DL (ref 12–16)
IMM GRANULOCYTES # BLD AUTO: 0.03 K/UL (ref 0–0.04)
IMM GRANULOCYTES NFR BLD AUTO: 0.4 % (ref 0–0.5)
LDLC SERPL CALC-MCNC: 82 MG/DL (ref 63–159)
LYMPHOCYTES # BLD AUTO: 2.1 K/UL (ref 1–4.8)
LYMPHOCYTES NFR BLD: 25.7 % (ref 18–48)
MCH RBC QN AUTO: 29.5 PG (ref 27–31)
MCHC RBC AUTO-ENTMCNC: 31 G/DL (ref 32–36)
MCV RBC AUTO: 95 FL (ref 82–98)
MONOCYTES # BLD AUTO: 0.5 K/UL (ref 0.3–1)
MONOCYTES NFR BLD: 5.5 % (ref 4–15)
NEUTROPHILS # BLD AUTO: 5.5 K/UL (ref 1.8–7.7)
NEUTROPHILS NFR BLD: 67 % (ref 38–73)
NONHDLC SERPL-MCNC: 115 MG/DL
NRBC BLD-RTO: 0 /100 WBC
PLATELET # BLD AUTO: 328 K/UL (ref 150–450)
PMV BLD AUTO: 10.5 FL (ref 9.2–12.9)
POTASSIUM SERPL-SCNC: 4.2 MMOL/L (ref 3.5–5.1)
PROT SERPL-MCNC: 7.3 G/DL (ref 6–8.4)
RBC # BLD AUTO: 4.57 M/UL (ref 4–5.4)
SODIUM SERPL-SCNC: 138 MMOL/L (ref 136–145)
TRIGL SERPL-MCNC: 165 MG/DL (ref 30–150)
TSH SERPL DL<=0.005 MIU/L-ACNC: 0.87 UIU/ML (ref 0.4–4)
WBC # BLD AUTO: 8.17 K/UL (ref 3.9–12.7)

## 2024-07-16 PROCEDURE — 99999 PR PBB SHADOW E&M-EST. PATIENT-LVL V: CPT | Mod: PBBFAC,,,

## 2024-07-16 PROCEDURE — 3288F FALL RISK ASSESSMENT DOCD: CPT | Mod: CPTII,S$GLB,,

## 2024-07-16 PROCEDURE — 1159F MED LIST DOCD IN RCRD: CPT | Mod: CPTII,S$GLB,,

## 2024-07-16 PROCEDURE — 80061 LIPID PANEL: CPT

## 2024-07-16 PROCEDURE — 1101F PT FALLS ASSESS-DOCD LE1/YR: CPT | Mod: CPTII,S$GLB,,

## 2024-07-16 PROCEDURE — 3075F SYST BP GE 130 - 139MM HG: CPT | Mod: CPTII,S$GLB,,

## 2024-07-16 PROCEDURE — 1126F AMNT PAIN NOTED NONE PRSNT: CPT | Mod: CPTII,S$GLB,,

## 2024-07-16 PROCEDURE — 85025 COMPLETE CBC W/AUTO DIFF WBC: CPT | Performed by: STUDENT IN AN ORGANIZED HEALTH CARE EDUCATION/TRAINING PROGRAM

## 2024-07-16 PROCEDURE — 36415 COLL VENOUS BLD VENIPUNCTURE: CPT | Mod: PO | Performed by: STUDENT IN AN ORGANIZED HEALTH CARE EDUCATION/TRAINING PROGRAM

## 2024-07-16 PROCEDURE — 1160F RVW MEDS BY RX/DR IN RCRD: CPT | Mod: CPTII,S$GLB,,

## 2024-07-16 PROCEDURE — 3079F DIAST BP 80-89 MM HG: CPT | Mod: CPTII,S$GLB,,

## 2024-07-16 PROCEDURE — 80053 COMPREHEN METABOLIC PANEL: CPT | Performed by: STUDENT IN AN ORGANIZED HEALTH CARE EDUCATION/TRAINING PROGRAM

## 2024-07-16 PROCEDURE — 99214 OFFICE O/P EST MOD 30 MIN: CPT | Mod: S$GLB,,,

## 2024-07-16 PROCEDURE — 84443 ASSAY THYROID STIM HORMONE: CPT | Performed by: STUDENT IN AN ORGANIZED HEALTH CARE EDUCATION/TRAINING PROGRAM

## 2024-07-16 PROCEDURE — 83036 HEMOGLOBIN GLYCOSYLATED A1C: CPT | Performed by: STUDENT IN AN ORGANIZED HEALTH CARE EDUCATION/TRAINING PROGRAM

## 2024-07-16 RX ORDER — BENZONATATE 100 MG/1
100 CAPSULE ORAL 3 TIMES DAILY PRN
Qty: 30 CAPSULE | Refills: 0 | Status: SHIPPED | OUTPATIENT
Start: 2024-07-16 | End: 2024-07-26

## 2024-07-16 RX ORDER — CITALOPRAM 10 MG/1
10 TABLET ORAL DAILY
Qty: 30 TABLET | Refills: 2 | Status: SHIPPED | OUTPATIENT
Start: 2024-07-16 | End: 2024-07-17

## 2024-07-16 RX ORDER — AZELASTINE 1 MG/ML
1 SPRAY, METERED NASAL 2 TIMES DAILY PRN
Qty: 30 ML | Refills: 2 | Status: SHIPPED | OUTPATIENT
Start: 2024-07-16 | End: 2025-07-16

## 2024-07-16 NOTE — PROGRESS NOTES
"Subjective:       Patient ID: Radha Alas is a 83 y.o. female.    Chief Complaint: Annual Exam    Routine follow up.     HTN. Stable. Continue meds.    DM. Due for labs, urine testing. Reports seeing Dr. Raza for eye care q 6 months.     Has a cough recently. Dry. Persistent. Family member also has similar symptoms. Post nasal drip, rhinorrhea. Denies pneumonia in the past. No fever, chills ongoing.     Complains of anxiety. Other meds tried in the past weren't successful. They are open to trying celexa.         Past Medical History:   Diagnosis Date    Anemia     Atrial fibrillation     Chronic constipation     Deep vein thrombosis of left lower extremity     Diabetes mellitus     TYPE 2    Diverticulosis     Hypertension     Obesity     Parkinson disease     Peptic ulcer of stomach     Prolapse of female bladder, acquired     Rectocele     Uterine cancer 11-1-2011    STAGE 1-A GRADE 2       Review of patient's allergies indicates:   Allergen Reactions    Glucophage  [metformin]      Other reaction(s): Diarrhea  Other reaction(s): Diarrhea    Sulfa (sulfonamide antibiotics) Itching     Other reaction(s): Itching  Other reaction(s): Itching         Current Outpatient Medications:     ACETAMINOPHEN (TYLENOL ARTHRITIS ORAL), Take by mouth., Disp: , Rfl:     diltiaZEM (CARDIZEM CD) 120 MG Cp24, TAKE 1 CAPSULE EVERY EVENING, Disp: 90 capsule, Rfl: 0    DROPLET PEN NEEDLE 31 gauge x 3/16" Ndle, USE TO ADMINISTER INSULIN ONE TIME DAILY, Disp: 100 each, Rfl: 3    ELIQUIS 5 mg Tab, TAKE 1 TABLET TWICE DAILY, Disp: 180 tablet, Rfl: 3    glimepiride (AMARYL) 4 MG tablet, TAKE 1 TABLET (4 MG TOTAL) BY MOUTH BEFORE BREAKFAST., Disp: 90 tablet, Rfl: 4    LEVEMIR FLEXPEN 100 unit/mL (3 mL) InPn pen, INJECT 20 UNITS INTO THE SKIN EVERY EVENING., Disp: 30 mL, Rfl: 0    lisinopriL (PRINIVIL,ZESTRIL) 20 MG tablet, TAKE 1 TABLET EVERY DAY, Disp: 90 tablet, Rfl: 0    pantoprazole (PROTONIX) 40 MG tablet, TAKE 1 TABLET ONE TIME DAILY, " "Disp: 90 tablet, Rfl: 3    polyethylene glycol (GLYCOLAX) 17 gram/dose powder, Take 17 g by mouth daily as needed., Disp: , Rfl:     selegiline (ELDEPRYL) 5 mg Cap, TAKE 1 CAPSULE TWICE DAILY, Disp: 180 capsule, Rfl: 3    TRUE METRIX GLUCOSE TEST STRIP Strp, TEST GLUCOSE LEVELS TWO TO THREE TIMES DAILY FOR DIABETES, Disp: 300 strip, Rfl: 3    azelastine (ASTELIN) 137 mcg (0.1 %) nasal spray, 1 spray (137 mcg total) by Nasal route 2 (two) times daily as needed for Rhinitis (sinus drainage, post nasal drip, sinus congestion)., Disp: 30 mL, Rfl: 2    benzonatate (TESSALON) 100 MG capsule, Take 1 capsule (100 mg total) by mouth 3 (three) times daily as needed for Cough., Disp: 30 capsule, Rfl: 0    citalopram (CELEXA) 10 MG tablet, Take 1 tablet (10 mg total) by mouth once daily., Disp: 30 tablet, Rfl: 2    Review of Systems   Constitutional:  Negative for activity change and unexpected weight change.   HENT:  Positive for hearing loss and rhinorrhea. Negative for trouble swallowing.    Eyes:  Negative for discharge and visual disturbance.   Respiratory:  Positive for cough. Negative for chest tightness and wheezing.    Cardiovascular:  Negative for chest pain and palpitations.   Gastrointestinal:  Negative for blood in stool, constipation, diarrhea and vomiting.   Endocrine: Negative for polydipsia and polyuria.   Genitourinary:  Negative for difficulty urinating, dysuria, hematuria and menstrual problem.   Musculoskeletal:  Negative for arthralgias, joint swelling and neck pain.   Neurological:  Positive for tremors. Negative for weakness and headaches.   Psychiatric/Behavioral:  Negative for confusion and dysphoric mood. The patient is nervous/anxious.        Objective:      /82 (BP Location: Right arm, Patient Position: Sitting, BP Method: Large (Manual))   Pulse 79   Resp 16   Ht 5' 8" (1.727 m)   Wt 109.2 kg (240 lb 11.9 oz)   SpO2 (!) 94%   BMI 36.60 kg/m²   Physical Exam  Vitals reviewed. "   Constitutional:       General: She is not in acute distress.     Appearance: Normal appearance. She is obese. She is not ill-appearing, toxic-appearing or diaphoretic.   HENT:      Head: Normocephalic.      Right Ear: External ear normal.      Left Ear: External ear normal.      Nose: Nose normal. No congestion or rhinorrhea.      Mouth/Throat:      Mouth: Mucous membranes are moist.      Pharynx: Oropharynx is clear.   Eyes:      General: No scleral icterus.        Right eye: No discharge.         Left eye: No discharge.      Extraocular Movements: Extraocular movements intact.      Conjunctiva/sclera: Conjunctivae normal.   Cardiovascular:      Rate and Rhythm: Normal rate and regular rhythm.      Pulses: Normal pulses.      Heart sounds: Normal heart sounds. No murmur heard.     No friction rub. No gallop.   Pulmonary:      Effort: Pulmonary effort is normal. No respiratory distress.      Breath sounds: Normal breath sounds. No wheezing, rhonchi or rales.      Comments: Cough present   Chest:      Chest wall: No tenderness.   Musculoskeletal:         General: No swelling, tenderness or deformity. Normal range of motion.      Cervical back: Normal range of motion.      Right lower leg: No edema.      Left lower leg: No edema.   Skin:     General: Skin is warm and dry.      Capillary Refill: Capillary refill takes less than 2 seconds.      Coloration: Skin is not jaundiced.      Findings: No bruising, erythema, lesion or rash.   Neurological:      Mental Status: She is alert and oriented to person, place, and time.      Motor: Tremor present.      Gait: Gait abnormal.         Assessment:       1. Routine general medical examination at health care facility    2. 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 Hypertension associated with diabetes    4. Hyperlipidemia associated with type 2  diabetes mellitus    5. Severe obesity (BMI 35.0-39.9) with comorbidity    6. Parkinson's disease, unspecified whether dyskinesia present, unspecified whether manifestations fluctuate    7. Chronic atrial fibrillation    8. Acute cough    9. 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 Will continue to monitor.     Hypertension associated with diabetes        -    Stable. Continue meds. Will continue to monitor.     Hyperlipidemia associated with type 2 diabetes mellitus  -     Lipid Panel; Future; Expected date: 07/16/2024        -    Continue meds. Will continue to monitor.     Severe obesity (BMI 35.0-39.9) with comorbidity        -     Patient would benefit from healthy diet, exercise and weight loss. Overall health and wellbeing would likely improve.      Parkinson's disease, unspecified whether dyskinesia present, unspecified whether manifestations fluctuate  -     Ambulatory referral/consult to Home Health; Future; Expected date: 07/17/2024    Chronic atrial fibrillation        -    Stable. Continue meds. Will continue to monitor.     Acute cough  -     benzonatate (TESSALON) 100 MG capsule; Take 1 capsule (100 mg total) by mouth 3 (three) times daily as needed for Cough.  Dispense: 30 capsule; Refill: 0  -     azelastine (ASTELIN) 137 mcg (0.1 %) nasal spray; 1 spray (137 mcg total) by Nasal route 2 (two) times daily as needed for Rhinitis (sinus drainage, post nasal drip, sinus congestion).  Dispense: 30 mL; Refill: 2    Anxiety  -     citalopram (CELEXA) 10 MG tablet; Take 1 tablet (10 mg total) by mouth once daily.  Dispense: 30 tablet; Refill: 2          One month with me. 6 months [place]         Ronen Zhu PA-C  Family Medicine Physician Assistant       Future Appointments       Date Provider Specialty Appt Notes    8/13/2024 Kobe Vail MD Cardiology Annual checkup               I spent a total of 20 minutes on the day of the visit.This includes face to face time and non-face to face time preparing to see the patient (eg, review of tests), obtaining and/or reviewing separately obtained history, documenting clinical information in the electronic or other health record, independently interpreting results and communicating results to the patient/family/caregiver, or care coordinator.      We have addressed [4] Moderate: 1 or more chronic illnesses with exacerbation, progression, or side effects of treatment / 2 or more stable chronic illnesses / 1 undiagnosed new problem with uncertain prognosis / 1 acute illness with systemic symptoms / 1 acute complicated injury  The complexity of the data reviewed and analyzed for this visit was [3] Limited (Reviewed prior external note, ordered unique testing or reviewed the results of each unique test)   The risk of complications and/or morbidity or mortality are [4] Moderate risk (I.e. prescription drug management / decision regarding minor surgery with identified pt or procedure risk factors / decision regarding elective major surgery without identified pt or procedure risk factors / diagnosis or treatment significantly limited by social determinants of health)   The level of Medical Decision Making for this visit is [4] Moderate

## 2024-07-16 NOTE — PATIENT INSTRUCTIONS
Doug Garcia,     If you are due for any health screening(s) below please notify me so we can arrange them to be ordered and scheduled to maintain your health. Most healthy patients complete it. Don't lose out on improving your health.     Tests to Keep You Healthy    Eye Exam: DUE                 Diabetic Retinal Eye Exam    Diabetes is the #1 cause of blindness in the [place] - early detection before signs or symptoms develop can prevent debilitating blindness.    Once-a-year screening is recommended for all diabetic patients. This exam can prevent and treat diabetes complications in the eye before developing symptoms. This can be done with a special camera is used to take photographs of the back of your eye without having to dilate them, or you can see an eye doctor for a full dilated exam.    Diabetic A1c Testing    Your chart identifies you as having diabetes. It is recommended that all diabetes patients have an A1c test done at least once a year, but [facility] recommends twice a year for most patients. This helps your doctor to better help you manage your diabetes. This is a non-fasting lab test which can be completed at any time. Your result will be sent to your Primary Care Provider for review and that office will contact you with the results.

## 2024-07-17 RX ORDER — CITALOPRAM 10 MG/1
10 TABLET ORAL DAILY
Qty: 30 TABLET | Refills: 2 | Status: CANCELLED | OUTPATIENT
Start: 2024-07-17 | End: 2025-07-17

## 2024-07-18 PROCEDURE — G0180 MD CERTIFICATION HHA PATIENT: HCPCS | Mod: ,,,

## 2024-07-26 ENCOUNTER — PATIENT OUTREACH (OUTPATIENT)
Dept: ADMINISTRATIVE | Facility: HOSPITAL | Age: 84
End: 2024-07-26
Payer: MEDICARE

## 2024-07-26 NOTE — LETTER
AUTHORIZATION FOR RELEASE OF   CONFIDENTIAL INFORMATION    [place] MD    We are seeing Radha Alas, date of birth 1940, in the clinic at [facility]. Prabhu Clark MD is the patient's PCP. Radha Alas has an outstanding lab/procedure at the time we reviewed her chart. In order to help keep her health information updated, she has authorized us to request the following medical record(s):       Diabetic EYE EXAM           Significant Findings:  ________ No Diabetic Retinopathy  ________ Minimal Background Diabetic Retinopathy  ________ Moderate to Severe Background Diabetic Retinopathy  ________ Clinically Significant Macular Edema  ________ Proliferative Diabetic Retinopathy             Please fax records to [facility], Naccari, Craig P, MD,  Please fax records to [facility], Naccari, Craig P, MD, [phone]     If you have any questions, please contact Shelli Kong, Care Coordinator   at [phone].            Patient Name: Radha Alas  : 1940  Patient Phone #: [phone]

## 2024-07-26 NOTE — PROGRESS NOTES
Population Health Chart Review & Patient Outreach Details      Additional [facility] Health Notes:               Updates Requested / Reviewed:      Updated Care Coordination Note, Care Everywhere, , and Care Team Updated         Health Maintenance Topics Overdue:      VBHM Score: 2     Osteoporosis Screening  Eye Exam    Tetanus Vaccine  Shingles/Zoster Vaccine  RSV Vaccine                  Health Maintenance Topic(s) Outreach Outcomes & Actions Taken:    Eye Exam - Outreach Outcomes & Actions Taken  : External Records Requested & Care Team Updated if Applicable

## 2024-07-31 ENCOUNTER — PATIENT OUTREACH (OUTPATIENT)
Dept: ADMINISTRATIVE | Facility: HOSPITAL | Age: 84
End: 2024-07-31
Payer: MEDICARE

## 2024-07-31 NOTE — PROGRESS NOTES
Population Health Chart Review & Patient Outreach Details      Additional [facility] Health Notes:               Updates Requested / Reviewed:      Updated Care Coordination Note and Immunizations Reconciliation Completed or Queried: [place]         Health Maintenance Topics Overdue:      [facility] Score: 2     Osteoporosis Screening  Eye Exam    Tetanus Vaccine  Shingles/Zoster Vaccine  RSV Vaccine                  Health Maintenance Topic(s) Outreach Outcomes & Actions Taken:    Eye Exam - Outreach Outcomes & Actions Taken  : Fax received last eye exam with Dr Raza 6/8/2023-previously uploaded

## 2024-08-01 ENCOUNTER — EXTERNAL HOME HEALTH (OUTPATIENT)
Dept: HOME HEALTH SERVICES | Facility: HOSPITAL | Age: 84
End: 2024-08-01
Payer: MEDICARE

## 2024-08-13 ENCOUNTER — PATIENT OUTREACH (OUTPATIENT)
Dept: ADMINISTRATIVE | Facility: HOSPITAL | Age: 84
End: 2024-08-13
Payer: MEDICARE

## 2024-08-13 NOTE — PROGRESS NOTES
Population Health Chart Review & Patient Outreach Details      Additional [facility] Notes:      Informed patient last eye exam was 2023         Updates Requested / Reviewed:      Updated Care Coordination Note, Care Everywhere, and          Health Maintenance Topics Overdue:      VB Score: 2     Osteoporosis Screening  Eye Exam    Tetanus Vaccine  Shingles/Zoster Vaccine  RSV Vaccine                  Health Maintenance Topic(s) Outreach Outcomes & Actions Taken:    Eye Exam - Outreach Outcomes & Actions Taken  : msg

## 2024-08-16 ENCOUNTER — OFFICE VISIT (OUTPATIENT)
Dept: FAMILY MEDICINE | Facility: CLINIC | Age: 84
End: 2024-08-16
Payer: MEDICARE

## 2024-08-16 VITALS
WEIGHT: 240.31 LBS | HEIGHT: 68 IN | SYSTOLIC BLOOD PRESSURE: 128 MMHG | HEART RATE: 105 BPM | BODY MASS INDEX: 36.42 KG/M2 | OXYGEN SATURATION: 98 % | DIASTOLIC BLOOD PRESSURE: 84 MMHG | RESPIRATION RATE: 18 BRPM

## 2024-08-16 DIAGNOSIS — E11.69 HYPERLIPIDEMIA ASSOCIATED WITH TYPE 2 DIABETES MELLITUS: ICD-10-CM

## 2024-08-16 DIAGNOSIS — G20.A1 MILD DEMENTIA DUE TO PARKINSON'S DISEASE, WITH MOOD DISTURBANCE: ICD-10-CM

## 2024-08-16 DIAGNOSIS — Z79.4 CONTROLLED TYPE 2 DIABETES MELLITUS WITH BOTH EYES AFFECTED BY MODERATE NONPROLIFERATIVE RETINOPATHY WITHOUT MACULAR EDEMA, WITH LONG-TERM CURRENT USE OF INSULIN: ICD-10-CM

## 2024-08-16 DIAGNOSIS — R05.1 ACUTE COUGH: ICD-10-CM

## 2024-08-16 DIAGNOSIS — E11.59 HYPERTENSION ASSOCIATED WITH DIABETES: ICD-10-CM

## 2024-08-16 DIAGNOSIS — G20.A1 PARKINSON'S DISEASE, UNSPECIFIED WHETHER DYSKINESIA PRESENT, UNSPECIFIED WHETHER MANIFESTATIONS FLUCTUATE: Primary | ICD-10-CM

## 2024-08-16 DIAGNOSIS — F02.A3 MILD DEMENTIA DUE TO PARKINSON'S DISEASE, WITH MOOD DISTURBANCE: ICD-10-CM

## 2024-08-16 DIAGNOSIS — E78.5 HYPERLIPIDEMIA ASSOCIATED WITH TYPE 2 DIABETES MELLITUS: ICD-10-CM

## 2024-08-16 DIAGNOSIS — I15.2 HYPERTENSION ASSOCIATED WITH DIABETES: ICD-10-CM

## 2024-08-16 DIAGNOSIS — E11.3393 CONTROLLED TYPE 2 DIABETES MELLITUS WITH BOTH EYES AFFECTED BY MODERATE NONPROLIFERATIVE RETINOPATHY WITHOUT MACULAR EDEMA, WITH LONG-TERM CURRENT USE OF INSULIN: ICD-10-CM

## 2024-08-16 PROCEDURE — 99999 PR PBB SHADOW E&M-EST. PATIENT-LVL V: CPT | Mod: PBBFAC,,,

## 2024-08-16 NOTE — PATIENT INSTRUCTIONS
Doug Garcia,     If you are due for any health screening(s) below please notify me so we can arrange them to be ordered and scheduled to maintain your health. Most healthy patients complete it. Don't lose out on improving your health.     Tests to Keep You Healthy    Eye Exam: DUE                 Diabetic Retinal Eye Exam    Diabetes is the #1 cause of blindness in the [place] - early detection before signs or symptoms develop can prevent debilitating blindness.    Once-a-year screening is recommended for all diabetic patients. This exam can prevent and treat diabetes complications in the eye before developing symptoms. This can be done with a special camera is used to take photographs of the back of your eye without having to dilate them, or you can see an eye doctor for a full dilated exam.

## 2024-08-16 NOTE — PROGRESS NOTES
"Subjective:       Patient ID: Radha Alas is a 83 y.o. female.    Chief Complaint: Follow-up (One month f/u)    Pt is an 84 yo F presenting for 1 month f/u. She had been started on azelastine and benzonatate for cough, PND and runny nose. Symptoms improved but did not resolve. She also has been experiencing headaches and tightness around the crown of her head a few times a week around bedtime. For her anxiety, she has been listening to [place] music. She states that it helps. Pt denies fever, chills, sore throat, body aches, chest pain, palpitations or shortness of breath.     Follow-up  This is a recurrent problem. The current episode started more than 1 month ago. The problem has been gradually improving. Associated symptoms include congestion, coughing and headaches. Pertinent negatives include no chest pain, chills, fever or sore throat. Treatments tried: azelastine, benzonatate. The treatment provided moderate relief.     Review of patient's allergies indicates:   Allergen Reactions    Glucophage  [metformin]      Other reaction(s): Diarrhea  Other reaction(s): Diarrhea    Sulfa (sulfonamide antibiotics) Itching     Other reaction(s): Itching  Other reaction(s): Itching         Current Outpatient Medications:     ACETAMINOPHEN (TYLENOL ARTHRITIS ORAL), Take by mouth., Disp: , Rfl:     azelastine (ASTELIN) 137 mcg (0.1 %) nasal spray, 1 spray (137 mcg total) by Nasal route 2 (two) times daily as needed for Rhinitis (sinus drainage, post nasal drip, sinus congestion)., Disp: 30 mL, Rfl: 2    diltiaZEM (CARDIZEM CD) 120 MG Cp24, TAKE 1 CAPSULE EVERY EVENING, Disp: 90 capsule, Rfl: 1    DROPLET PEN NEEDLE 31 gauge x 3/16" Ndle, USE TO ADMINISTER INSULIN ONE TIME DAILY, Disp: 100 each, Rfl: 3    ELIQUIS 5 mg Tab, TAKE 1 TABLET TWICE DAILY, Disp: 180 tablet, Rfl: 3    glimepiride (AMARYL) 4 MG tablet, TAKE 1 TABLET (4 MG TOTAL) BY MOUTH BEFORE BREAKFAST., Disp: 90 tablet, Rfl: 4    LEVEMIR FLEXPEN 100 unit/mL (3 mL) " InPn pen, INJECT 20 UNITS INTO THE SKIN EVERY EVENING., Disp: 30 mL, Rfl: 0    lisinopriL (PRINIVIL,ZESTRIL) 20 MG tablet, TAKE 1 TABLET EVERY DAY, Disp: 90 tablet, Rfl: 1    pantoprazole (PROTONIX) 40 MG tablet, TAKE 1 TABLET ONE TIME DAILY, Disp: 90 tablet, Rfl: 3    polyethylene glycol (GLYCOLAX) 17 gram/dose powder, Take 17 g by mouth daily as needed., Disp: , Rfl:     selegiline (ELDEPRYL) 5 mg Cap, TAKE 1 CAPSULE TWICE DAILY, Disp: 180 capsule, Rfl: 3    TRUE METRIX GLUCOSE TEST STRIP Strp, TEST GLUCOSE LEVELS TWO TO THREE TIMES DAILY FOR DIABETES, Disp: 300 strip, Rfl: 3    Lab Results   Component Value Date    WBC 8.17 07/16/2024    HGB 13.5 07/16/2024    HCT 43.6 07/16/2024     07/16/2024    CHOL 170 07/16/2024    TRIG 165 (H) 07/16/2024    HDL 55 07/16/2024    ALT 13 07/16/2024    AST 16 07/16/2024     07/16/2024    K 4.2 07/16/2024     07/16/2024    CREATININE 0.9 07/16/2024    BUN 13 07/16/2024    CO2 20 (L) 07/16/2024    TSH 0.873 07/16/2024    INR 1.4 10/04/2022    HGBA1C 6.7 (H) 07/16/2024    MICROALBUR 4.0 09/12/2017       Review of Systems   Constitutional:  Negative for chills and fever.   HENT:  Positive for congestion, postnasal drip and rhinorrhea. Negative for sinus pressure, sinus pain and sore throat.    Respiratory:  Positive for cough. Negative for shortness of breath.    Cardiovascular:  Negative for chest pain and palpitations.   Neurological:  Positive for headaches.       Objective:      Physical Exam  Vitals reviewed.   Constitutional:       General: She is not in acute distress.     Appearance: Normal appearance. She is obese. She is not ill-appearing, toxic-appearing or diaphoretic.   HENT:      Head: Normocephalic and atraumatic.      Right Ear: External ear normal.      Left Ear: External ear normal.      Nose: Nose normal. No congestion or rhinorrhea.      Mouth/Throat:      Mouth: Mucous membranes are moist.      Pharynx: Oropharynx is clear.   Eyes:       General: No scleral icterus.        Right eye: No discharge.         Left eye: No discharge.      Extraocular Movements: Extraocular movements intact.      Conjunctiva/sclera: Conjunctivae normal.   Cardiovascular:      Rate and Rhythm: Regular rhythm. Tachycardia present.      Pulses: Normal pulses.      Heart sounds: Normal heart sounds. No murmur heard.     No friction rub. No gallop.   Pulmonary:      Effort: Pulmonary effort is normal. No respiratory distress.      Breath sounds: Normal breath sounds. No wheezing, rhonchi or rales.   Chest:      Chest wall: No tenderness.   Musculoskeletal:         General: No swelling, tenderness or deformity. Normal range of motion.      Cervical back: Normal range of motion and neck supple.      Right lower leg: No edema.      Left lower leg: No edema.   Skin:     General: Skin is warm and dry.      Capillary Refill: Capillary refill takes less than 2 seconds.      Coloration: Skin is not jaundiced.      Findings: No bruising, erythema, lesion or rash.   Neurological:      Mental Status: She is alert and oriented to person, place, and time. Mental status is at baseline.      Gait: Gait abnormal.         Assessment:       1. Parkinson's disease, unspecified whether dyskinesia present, unspecified whether manifestations fluctuate    2. Hypertension associated with diabetes    3. 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 Hyperlipidemia associated with type 2 diabetes mellitus    5. 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 Acute cough        Plan:       Parkinson's disease, unspecified whether dyskinesia present, unspecified whether manifestations fluctuate  -     Ambulatory referral/consult to Neurology; Future; Expected date: 08/23/2024    Hypertension associated with diabetes        -    Stable. Continue meds. Will continue  to monitor.     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 Will continue to monitor.     Hyperlipidemia associated with type 2 diabetes mellitus        -    Continue meds. Will continue to monitor.     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 Will likely continue to improve. Continue meds        Corinne Cundiff, PA-S2   [employer] Physician Assistant Student      Ronen Zhu PA-C  Family Medicine Physician Assistant

## 2024-08-23 NOTE — TELEPHONE ENCOUNTER
----- Message from Chacha Nowak sent at 6/3/2019 10:22 AM CDT -----  /Type:  Test Results    Who Called:  Patient  Name of Test (Lab/Mammo/Etc):  Lab  Date of Test:  5/21/19  Ordering Provider:  V[place]  Where the test was performed:  [facility]  Best Call Back Number:  [phone] (home)         
Please advise message below pt states they have the reader for the Yoni [employer] (Proprietary)le monitor for 10 day use but no sensors for the arm. Pt spouse is requesting they get the sensors. im asked to send this to endocrine per Dr Gil. Hope you can help  
Please talk to the nurses in endocrinology.  
Pt and her spouse informed [facility] lab results are in your inbox to be reviewed on 6/4. Spouse states that the flash glucose monotor for the arm only came with the reader. Pt needs the sensors that stay on the arm for 10 days ordered and sent to [email or web address]. I did not know how to pend that order please advise        
Yoni 10 is being discounted to the Yoni 14 (orders pending below for yoni 14)  
DISPLAY PLAN FREE TEXT

## 2024-08-30 NOTE — TELEPHONE ENCOUNTER
Called for pt again. Spoke to her. Rescheduled appt to 10/23/18 with Dr. Gil. Thanks, Carmela  
Called for pt to advise that Dr. Gil is out sick today and we need to reschedule. Answering machine picked up stating pt not available and to call again. Was not given option to leave message. Thanks, Carmela  
no

## 2024-09-04 ENCOUNTER — TELEPHONE (OUTPATIENT)
Dept: NEUROLOGY | Facility: CLINIC | Age: 84
End: 2024-09-04
Payer: MEDICARE

## 2024-09-04 ENCOUNTER — OFFICE VISIT (OUTPATIENT)
Dept: CARDIOLOGY | Facility: CLINIC | Age: 84
End: 2024-09-04
Payer: MEDICARE

## 2024-09-04 VITALS
HEIGHT: 68 IN | RESPIRATION RATE: 16 BRPM | BODY MASS INDEX: 36.54 KG/M2 | DIASTOLIC BLOOD PRESSURE: 82 MMHG | SYSTOLIC BLOOD PRESSURE: 134 MMHG | HEART RATE: 77 BPM | OXYGEN SATURATION: 99 %

## 2024-09-04 DIAGNOSIS — E11.69 HYPERLIPIDEMIA ASSOCIATED WITH TYPE 2 DIABETES MELLITUS: ICD-10-CM

## 2024-09-04 DIAGNOSIS — E11.59 HYPERTENSION ASSOCIATED WITH DIABETES: ICD-10-CM

## 2024-09-04 DIAGNOSIS — I15.2 HYPERTENSION ASSOCIATED WITH DIABETES: ICD-10-CM

## 2024-09-04 DIAGNOSIS — I48.20 CHRONIC ATRIAL FIBRILLATION: Primary | ICD-10-CM

## 2024-09-04 DIAGNOSIS — E78.5 HYPERLIPIDEMIA ASSOCIATED WITH TYPE 2 DIABETES MELLITUS: ICD-10-CM

## 2024-09-04 DIAGNOSIS — G20.A1 PARKINSON'S DISEASE WITHOUT DYSKINESIA, UNSPECIFIED WHETHER MANIFESTATIONS FLUCTUATE: ICD-10-CM

## 2024-09-04 PROCEDURE — 99999 PR PBB SHADOW E&M-EST. PATIENT-LVL III: CPT | Mod: PBBFAC,,, | Performed by: INTERNAL MEDICINE

## 2024-09-04 PROCEDURE — 1126F AMNT PAIN NOTED NONE PRSNT: CPT | Mod: CPTII,S$GLB,, | Performed by: INTERNAL MEDICINE

## 2024-09-04 PROCEDURE — 1160F RVW MEDS BY RX/DR IN RCRD: CPT | Mod: CPTII,S$GLB,, | Performed by: INTERNAL MEDICINE

## 2024-09-04 PROCEDURE — 1101F PT FALLS ASSESS-DOCD LE1/YR: CPT | Mod: CPTII,S$GLB,, | Performed by: INTERNAL MEDICINE

## 2024-09-04 PROCEDURE — 3075F SYST BP GE 130 - 139MM HG: CPT | Mod: CPTII,S$GLB,, | Performed by: INTERNAL MEDICINE

## 2024-09-04 PROCEDURE — 99213 OFFICE O/P EST LOW 20 MIN: CPT | Mod: S$GLB,,, | Performed by: INTERNAL MEDICINE

## 2024-09-04 PROCEDURE — 1159F MED LIST DOCD IN RCRD: CPT | Mod: CPTII,S$GLB,, | Performed by: INTERNAL MEDICINE

## 2024-09-04 PROCEDURE — 3288F FALL RISK ASSESSMENT DOCD: CPT | Mod: CPTII,S$GLB,, | Performed by: INTERNAL MEDICINE

## 2024-09-04 PROCEDURE — 3079F DIAST BP 80-89 MM HG: CPT | Mod: CPTII,S$GLB,, | Performed by: INTERNAL MEDICINE

## 2024-09-04 NOTE — PROGRESS NOTES
Subjective:    Patient ID:  Radha Alas is a 83 y.o. female patient here for evaluation Follow-up      History of Present Illness:     3-year-old lady with history of chronic atrial fibrillation deep vein thrombosis type 2 diabetes and Parkinson's disease seeking follow-up evaluation attended by her .  Clinically she has no occurrence of any new symptoms reported.  No nausea vomiting noted.  No history of melena or hematochezia reported.  Occasional mild cough is noted.  No fevers no chills and  Dry mouth is noted        Review of patient's allergies indicates:   Allergen Reactions    Glucophage  [metformin]      Other reaction(s): Diarrhea  Other reaction(s): Diarrhea    Sulfa (sulfonamide antibiotics) Itching     Other reaction(s): Itching  Other reaction(s): Itching       Past Medical History:   Diagnosis Date    Anemia     Atrial fibrillation     Chronic constipation     Deep vein thrombosis of left lower extremity     Diabetes mellitus     TYPE 2    Diverticulosis     Hypertension     Obesity     Parkinson disease     Peptic ulcer of stomach     Prolapse of female bladder, acquired     Rectocele     Uterine cancer 11-1-2011    STAGE 1-A GRADE 2     Past Surgical History:   Procedure Laterality Date    COLONOSCOPY  prior to 2000    COLONOSCOPY N/A 9/28/2016    Procedure: COLONOSCOPY;  Surgeon: Baljinder Ospina MD;  Location: [facility];  Service: Endoscopy;  Laterality: N/A;    FLEXIBLE SIGMOIDOSCOPY  06/05/2016    at [facility]- in media section: poor prep, diverticulosis noted with old clots, internal hemorrhoids otherwise normal findings- recommend outpatient colonoscopy    FOREARM SURGERY      right forearm surgery    HERNIA REPAIR      ROBOTIC ASST    HYSTERECTOMY  11/1/2001    [facility]/BSO--cancer    PELVIC AND PARA-AORTIC LYMPH NODE DISSECTION      GA REMOVAL OF OVARY/TUBE(S)      ROBOTIC ASST    TUBAL LIGATION      UPPER GASTROINTESTINAL ENDOSCOPY       Social History     Tobacco Use    Smoking status:  Never    Smokeless tobacco: Never   Substance Use Topics    Alcohol use: No     Alcohol/week: 0.0 standard drinks of alcohol    Drug use: Yes        Review of Systems:    As noted in HPI in addition      REVIEW OF SYSTEMS  CARDIOVASCULAR: No recent chest pain, palpitations, arm, neck, or jaw pain  RESPIRATORY: No recent fever, cough chills, SOB or congestion  : No blood in the urine  GI: No Nausea, vomiting, constipation, diarrhea, blood, or reflux.  MUSCULOSKELETAL: No myalgias  NEURO: No lightheadedness or dizziness  EYES: No Double vision, blurry, vision or headache              Objective        Vitals:    09/04/24 1404   BP: 134/82   Pulse: 77   Resp: 16       LIPIDS - LAST 2   Lab Results   Component Value Date    CHOL 170 07/16/2024    CHOL 173 06/20/2023    HDL 55 07/16/2024    HDL 60 06/20/2023    LDLCALC 82.0 07/16/2024    LDLCALC 87.0 06/20/2023    TRIG 165 (H) 07/16/2024    TRIG 130 06/20/2023    CHOLHDL 32.4 07/16/2024    CHOLHDL 34.7 06/20/2023       CBC - LAST 2  Lab Results   Component Value Date    WBC 8.17 07/16/2024    WBC 7.09 06/20/2023    RBC 4.57 07/16/2024    RBC 4.56 06/20/2023    HGB 13.5 07/16/2024    HGB 13.5 06/20/2023    HCT 43.6 07/16/2024    HCT 41.6 06/20/2023    MCV 95 07/16/2024    MCV 91 06/20/2023    MCH 29.5 07/16/2024    MCH 29.6 06/20/2023    MCHC 31.0 (L) 07/16/2024    MCHC 32.5 06/20/2023    RDW 13.7 07/16/2024    RDW 13.1 06/20/2023     07/16/2024     06/20/2023    MPV 10.5 07/16/2024    MPV 10.4 06/20/2023    GRAN 5.5 07/16/2024    GRAN 67.0 07/16/2024    LYMPH 2.1 07/16/2024    LYMPH 25.7 07/16/2024    MONO 0.5 07/16/2024    MONO 5.5 07/16/2024    BASO 0.04 07/16/2024    BASO 0.04 06/20/2023    NRBC 0 07/16/2024    NRBC 0 06/20/2023       CHEMISTRY & LIVER FUNCTION - LAST 2  Lab Results   Component Value Date     07/16/2024     06/20/2023    K 4.2 07/16/2024    K 4.2 06/20/2023     07/16/2024     06/20/2023    CO2 20 (L) 07/16/2024     CO2 22 (L) 06/20/2023    ANIONGAP 13 07/16/2024    ANIONGAP 12 06/20/2023    BUN 13 07/16/2024    BUN 13 06/20/2023    CREATININE 0.9 07/16/2024    CREATININE 0.8 06/20/2023     (H) 07/16/2024     (H) 06/20/2023    CALCIUM 9.1 07/16/2024    CALCIUM 9.6 06/20/2023    MG 1.8 10/06/2022    MG 2.2 10/05/2022    ALBUMIN 3.2 (L) 07/16/2024    ALBUMIN 3.3 (L) 06/20/2023    PROT 7.3 07/16/2024    PROT 7.4 06/20/2023    ALKPHOS 78 07/16/2024    ALKPHOS 84 06/20/2023    ALT 13 07/16/2024    ALT 19 06/20/2023    AST 16 07/16/2024    AST 20 06/20/2023    BILITOT 0.5 07/16/2024    BILITOT 0.5 06/20/2023        CARDIAC PROFILE - LAST 2  Lab Results   Component Value Date    BNP 73 10/04/2022    CPK 48 10/04/2022    TROPONINI <0.030 10/04/2022        COAGULATION - LAST 2  Lab Results   Component Value Date    LABPT 16.0 (H) 10/04/2022    LABPT 19.1 (H) 04/20/2020    INR 1.4 10/04/2022    INR 1.7 04/20/2020    APTT 28.4 10/04/2022    APTT 24.0 04/20/2020       ENDOCRINE & PSA - LAST 2  Lab Results   Component Value Date    HGBA1C 6.7 (H) 07/16/2024    HGBA1C 6.7 (H) 06/20/2023    MICROALBUR 4.0 09/12/2017    TSH 0.873 07/16/2024    TSH 0.580 10/04/2022        ECHOCARDIOGRAM RESULTS  No results found for this or any previous visit.      CURRENT/PREVIOUS VISIT EKG  Results for orders placed or performed during the hospital encounter of 10/04/22   EKG 12-lead    Collection Time: 10/04/22  2:19 PM    Narrative    Test Reason : K92.2,    Vent. Rate : 075 BPM     Atrial Rate : 227 BPM     P-R Int : 000 ms          QRS Dur : 080 ms      QT Int : 356 ms       P-R-T Axes : 000 -11 015 degrees     QTc Int : 397 ms    Atrial fibrillation  Abnormal ECG  When compared with ECG of 20-APR-2020 08:09,  Non-specific change in ST segment in Lateral leads  Nonspecific T wave abnormality now evident in Inferior leads  T wave amplitude has decreased in Anterior leads  Confirmed by Anoop Vail MD ([id number]) on 10/7/2022 5:55:11  "PM    Referred By: LADAN   SELF           Confirmed By:Anoop Vail MD     No valid procedures specified.   No results found for this or any previous visit.    No valid procedures specified.    PHYSICAL EXAM  CONSTITUTIONAL: Well built, well nourished in no apparent distress  Patient was evaluated in the wheelchair  NECK: no carotid bruit, no JVD  LUNGS: CTA  CHEST WALL: no tenderness  HEART:  Irregular rhythm no distinct S3 gallop ABDOMEN: soft, non-tender; bowel sounds normal; no masses,  no organomegaly  EXTREMITIES: Extremities normal, no edema, no calf tenderness noted  NEURO: AAO X 3    I HAVE REVIEWED :    The vital signs, nurses notes, and all the pertinent radiology and labs.        Current Outpatient Medications   Medication Instructions    ACETAMINOPHEN (TYLENOL ARTHRITIS ORAL) Oral    azelastine (ASTELIN) 137 mcg, Nasal, 2 times daily PRN    diltiaZEM (CARDIZEM CD) 120 MG Cp24 TAKE 1 CAPSULE EVERY EVENING    DROPLET PEN NEEDLE 31 gauge x 3/16" Ndle USE TO ADMINISTER INSULIN ONE TIME DAILY    ELIQUIS 5 mg Tab TAKE 1 TABLET TWICE DAILY    glimepiride (AMARYL) 4 mg, Oral, Before breakfast    LEVEMIR FLEXPEN 100 unit/mL (3 mL) InPn pen INJECT 20 UNITS INTO THE SKIN EVERY EVENING.    lisinopriL (PRINIVIL,ZESTRIL) 20 MG tablet TAKE 1 TABLET EVERY DAY    pantoprazole (PROTONIX) 40 mg, Oral    polyethylene glycol (GLYCOLAX) 17 g, Oral, Daily PRN    selegiline (ELDEPRYL) 5 mg Cap TAKE 1 CAPSULE TWICE DAILY    TRUE METRIX GLUCOSE TEST STRIP Strp TEST GLUCOSE LEVELS TWO TO THREE TIMES DAILY FOR DIABETES          Assessment & Plan     1. Chronic atrial fibrillation  Overview:  Established with Dr. Mueller, on eliquis    Assessment & Plan:  Rates are well controlled on diltiazem 120 mg daily maintain the same continue on Eliquis 5 mg p.o. b.i.d..      2. Hypertension associated with diabetes  Assessment & Plan:  Hypertension associated with diabetes continue on lisinopril 20 mg a day and diltiazem 120 mg " nightly stable on this regimen      3. Hyperlipidemia associated with type 2 diabetes mellitus  Assessment & Plan:  Lipid levels are well controlled on the present therapy maintain the same      4. Parkinson's disease without dyskinesia, unspecified whether manifestations fluctuate  Assessment & Plan:  Bradykinesia and resting tremors still persisting.  Continue on present            Follow up in about 8 months (around 5/4/2025).

## 2024-09-04 NOTE — TELEPHONE ENCOUNTER
Spoke to the pt's , please call to schedule a f/u visit with Dr. Rodriguez.  LOV 5/22.  Parkinsons.

## 2024-09-04 NOTE — TELEPHONE ENCOUNTER
Called patient today about scheduling a F/U with the doctor. Patient picks up, I asked if she prefers virtual or in person. She prefers virtual appointment. Patient has been scheduled on September 23 2:00pm for a virtual visit.

## 2024-09-04 NOTE — ASSESSMENT & PLAN NOTE
Rates are well controlled on diltiazem 120 mg daily maintain the same continue on Eliquis 5 mg p.o. b.i.d..

## 2024-09-04 NOTE — ASSESSMENT & PLAN NOTE
Hypertension associated with diabetes continue on lisinopril 20 mg a day and diltiazem 120 mg nightly stable on this regimen

## 2024-09-23 ENCOUNTER — PATIENT MESSAGE (OUTPATIENT)
Dept: FAMILY MEDICINE | Facility: CLINIC | Age: 84
End: 2024-09-23
Payer: MEDICARE

## 2024-09-23 ENCOUNTER — OFFICE VISIT (OUTPATIENT)
Dept: NEUROLOGY | Facility: CLINIC | Age: 84
End: 2024-09-23
Payer: MEDICARE

## 2024-09-23 DIAGNOSIS — G20.A1 PARKINSON'S DISEASE, UNSPECIFIED WHETHER DYSKINESIA PRESENT, UNSPECIFIED WHETHER MANIFESTATIONS FLUCTUATE: Primary | ICD-10-CM

## 2024-09-23 DIAGNOSIS — G20.A1 MILD DEMENTIA DUE TO PARKINSON'S DISEASE, WITH MOOD DISTURBANCE: ICD-10-CM

## 2024-09-23 DIAGNOSIS — F02.A3 MILD DEMENTIA DUE TO PARKINSON'S DISEASE, WITH MOOD DISTURBANCE: ICD-10-CM

## 2024-09-23 PROCEDURE — 99214 OFFICE O/P EST MOD 30 MIN: CPT | Mod: 95,,, | Performed by: PSYCHIATRY & NEUROLOGY

## 2024-09-23 RX ORDER — RIVASTIGMINE TARTRATE 1.5 MG/1
1.5 CAPSULE ORAL DAILY
Qty: 30 CAPSULE | Refills: 11 | Status: SHIPPED | OUTPATIENT
Start: 2024-09-23 | End: 2025-09-23

## 2024-09-23 NOTE — PROGRESS NOTES
"The patient location is: home  The chief complaint leading to consultation is: PD  Visit type: audiovisual  Total time spent with patient: 20mins  Each patient to whom he or she provides medical services by telemedicine is:  (1) informed of the relationship between the physician and patient and the respective role of any other health care provider with respect to management of the patient; and (2) notified that he or she may decline to receive medical services by telemedicine and may withdraw from such care at any time.    Notes: below    MOVEMENT DISORDERS CLINIC NEW    PCP/Referring Provider: Ronen Zhu PA-C  [address] [facility]  [place] [address]  Date of Service: 9/23/2024    Chief Complaint: PD    Her son as historian    Interval Hx    Since last visit,   Deteriorated gait wise  Since 2022 - cannot walk wheelchair bound  Eyes stay closed often  R hand tremor minor  Writing is worse    Could not tolerate carbidopa/levodopa or donepezil    Selegiline 5mg BID    No falls  Memory decline moderate  No hallucinations  No dysphagia      "priorHPI: Radha Alas is a R HANDED 84 y.o. female with a medical issues significant for afib, DM2, depression, DVT, coming for PD care. Son reports issues with gait since 8 years. 4 years of tremors both hand began at the same time. She notes L arm is less mobile than R. R leg moves better than L. Cane 4 years ago, then walker 2 years ago, no wheelchair since 1-2 years. At this point can only walk with assist very short distances.    Medication history  On selgiline 5mg PO BID  Was taking carbidopa/levodopa 25/100mg 1 tab CR PO BID - when she takes this it caused confusion and hallucinations    No PDism in family    Neuroleptic exposure:  Prior was on abilify 10 years ago  Currently on buspar    Current Living Situation:  home    PD Review of Symptoms:  Anosmia: yes  Dysarthria/Hypophonia: -  Dysphagia/Sialorrhea: -  Depression: -  Cognitive slowing: -  Hallucinations: " "-  Impulsivity: -  Obsessions/Compulsions: -  Urinary changes: -  Constipation: -  Orthostasis: -  Erectile Dysfunction: -  Dyskinesia: -  Falls: -  Freezing: -  Micrographia: -  Sleep issues:  -JEAN: -  -RBD: yes    Review of Systems:   Review of Systems   Constitutional:  Negative for fever.   HENT:  Negative for congestion.    Eyes:  Negative for double vision.   Respiratory:  Negative for cough and shortness of breath.    Cardiovascular:  Negative for chest pain and leg swelling.   Gastrointestinal:  Negative for nausea.   Genitourinary:  Negative for dysuria.   Musculoskeletal:  Positive for falls.   Skin:  Negative for rash.   Neurological:  Positive for tremors and speech change. Negative for headaches.   Psychiatric/Behavioral:  Positive for hallucinations and memory loss. Negative for depression.          Current Medications:  Outpatient Encounter Medications as of 9/23/2024   Medication Sig Dispense Refill    ACETAMINOPHEN (TYLENOL ARTHRITIS ORAL) Take by mouth.      azelastine (ASTELIN) 137 mcg (0.1 %) nasal spray 1 spray (137 mcg total) by Nasal route 2 (two) times daily as needed for Rhinitis (sinus drainage, post nasal drip, sinus congestion). 30 mL 2    diltiaZEM (CARDIZEM CD) 120 MG Cp24 TAKE 1 CAPSULE EVERY EVENING 90 capsule 1    DROPLET PEN NEEDLE 31 gauge x 3/16" Ndle USE TO ADMINISTER INSULIN ONE TIME DAILY 100 each 3    ELIQUIS 5 mg Tab TAKE 1 TABLET TWICE DAILY 180 tablet 3    glimepiride (AMARYL) 4 MG tablet TAKE 1 TABLET (4 MG TOTAL) BY MOUTH BEFORE BREAKFAST. 90 tablet 4    LEVEMIR FLEXPEN 100 unit/mL (3 mL) InPn pen INJECT 20 UNITS INTO THE SKIN EVERY EVENING. 30 mL 0    lisinopriL (PRINIVIL,ZESTRIL) 20 MG tablet TAKE 1 TABLET EVERY DAY 90 tablet 1    pantoprazole (PROTONIX) 40 MG tablet TAKE 1 TABLET ONE TIME DAILY 90 tablet 3    polyethylene glycol (GLYCOLAX) 17 gram/dose powder Take 17 g by mouth daily as needed.      rivastigmine tartrate (EXELON) 1.5 MG capsule Take 1 capsule (1.5 mg " total) by mouth once daily. 30 capsule 11    selegiline (ELDEPRYL) 5 mg Cap TAKE 1 CAPSULE TWICE DAILY 180 capsule 3    TRUE METRIX GLUCOSE TEST STRIP Strp TEST GLUCOSE LEVELS TWO TO THREE TIMES DAILY FOR DIABETES 300 strip 3     No facility-administered encounter medications on file as of 9/23/2024.       Past Medical History:  Patient Active Problem List   Diagnosis    History of endometrial cancer    Rectocele    Chronic constipation    Vaginal atrophy    Cystocele    Nocturia    Parkinson disease    Chronic atrial fibrillation    Vaginal vault prolapse, posthysterectomy    Mixed urge and stress incontinence    Hypertension associated with diabetes    Type 2 diabetes mellitus with diabetic neuropathy, with long-term current use of insulin    History of DVT (deep vein thrombosis)    Primary osteoarthritis involving multiple joints    Hyperlipidemia associated with type 2 diabetes mellitus    GERD without esophagitis    Diverticulosis    Need for assistance due to reduced mobility    Palliative care encounter    Advanced directives, counseling/discussion    Anxiety    History of recent fall    Impaired mobility and activities of daily living    Situational depression    Weakness    Hearing loss of left ear due to cerumen impaction    Wheelchair confinement    Anticoagulant long-term use    Severe obesity (BMI 35.0-39.9) with comorbidity       Past Surgical History:  Past Surgical History:   Procedure Laterality Date    COLONOSCOPY  prior to 2000    COLONOSCOPY N/A 9/28/2016    Procedure: COLONOSCOPY;  Surgeon: Baljinder Ospina MD;  Location: [facility];  Service: Endoscopy;  Laterality: N/A;    FLEXIBLE SIGMOIDOSCOPY  06/05/2016    at [facility]- in media section: poor prep, diverticulosis noted with old clots, internal hemorrhoids otherwise normal findings- recommend outpatient colonoscopy    FOREARM SURGERY      right forearm surgery    HERNIA REPAIR      ROBOTIC ASST    HYSTERECTOMY  11/1/2001    [facility]/BSO--cancer     PELVIC AND PARA-AORTIC LYMPH NODE DISSECTION      AZ REMOVAL OF OVARY/TUBE(S)      ROBOTIC ASST    TUBAL LIGATION      UPPER GASTROINTESTINAL ENDOSCOPY         Social:  Social History     Socioeconomic History    Marital status:      Spouse name: Isak Alas   Occupational History    Occupation: retired teacher   Tobacco Use    Smoking status: Never    Smokeless tobacco: Never   Substance and Sexual Activity    Alcohol use: No     Alcohol/week: 0.0 standard drinks of alcohol    Drug use: Yes    Sexual activity: Not Currently     Social Determinants of Health     Financial Resource Strain: Low Risk  (12/20/2023)    Overall Financial Resource Strain (CARDIA)     Difficulty of Paying Living Expenses: Not very hard   Food Insecurity: No Food Insecurity (12/20/2023)    Hunger Vital Sign     Worried About Running Out of Food in the Last Year: Never true     Ran Out of Food in the Last Year: Never true   Transportation Needs: No Transportation Needs (12/20/2023)    PRAPARE - Transportation     Lack of Transportation (Medical): No     Lack of Transportation (Non-Medical): No   Physical Activity: Unknown (12/20/2023)    Exercise Vital Sign     Days of Exercise per Week: 0 days   Stress: No Stress Concern Present (12/20/2023)    [employer] [place] [employer] - Occupational Stress Questionnaire     Feeling of Stress : Not at all   Housing Stability: Low Risk  (12/20/2023)    Housing Stability Vital Sign     Unable to Pay for Housing in the Last Year: No     Number of Places Lived in the Last Year: 1     Unstable Housing in the Last Year: No       Family History:  Family History   Problem Relation Name Age of Onset    Heart disease Mother      Diabetes Mother      Hypertension Father      Heart disease Sister      Diabetes Maternal Grandmother      Hypertension Maternal Grandmother      No Known Problems Brother      No Known Problems Daughter      No Known Problems Son      No Known Problems Maternal Aunt       No Known Problems Maternal Uncle      No Known Problems Paternal Aunt      No Known Problems Paternal Uncle      No Known Problems Maternal Grandfather      No Known Problems Paternal Grandmother      No Known Problems Paternal Grandfather      Breast cancer Neg Hx      Ovarian cancer Neg Hx      Uterine cancer Neg Hx      Colon cancer Neg Hx      Cancer Neg Hx      Cervical cancer Neg Hx      Endometrial cancer Neg Hx      Vaginal cancer Neg Hx      Colon polyps Neg Hx      Crohn's disease Neg Hx      Ulcerative colitis Neg Hx         PHYSICAL:  There were no vitals taken for this visit.    Physical Exam  Constitutional: Well-developed, well-nourished, appears stated age  Eyes: No scleral icterus  ENT: Moist oral mucosa  Cardiovascular: No lower extremity edema   Respiratory: No labored breathing   Skin: No rash   Hematologic: No bruising  Other: GI/ deferred   Mental status: Alert and oriented to person, place, time, and situation;   Speech: normal (not dysarthric), no aphasia  Cranial nerves:            CN II: Pupils mid-position and equal, not tested light or accommodation  CN III, IV, VI: Extraocular movements full, no nystagmus visualized  CN V: Not tested   CN VII: Face strong and symmetric bilaterally   CN VIII: Hearing intact to voice and conversation   CN IX, X: Palate raises midline and symmetric   CN XI: Strong shoulder shrug B/L  CN XII: Tongue appears midline   Motor: Normal bulk by appearance, no drift   Sensory: Not tested    Gait: Not tested  Deep tendon reflexes: Not tested  Movement/Coordination                    Severe hypophonic speech.                     Severe facial masking.  Mod b/l hand pillrolling tremor    Bradykinesia  ? Finger taps Finger flicks JAQUELIN Heel taps   Left 3+ 2+ 2+ 2+   Right 3+ 2+ 2+ 2+       Laboratory Data:  NA    Imaging:  NA    Assessment//Plan:   Problem List Items Addressed This Visit          Neuro    Parkinson disease - Primary    Current Assessment & Plan      B/L profound PDism since 4-8 years.  She is unfortunately acutely sensitive to Ldopa - confusions and hallucinations with small doses 25/100 CR.  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

## 2024-09-23 NOTE — ASSESSMENT & PLAN NOTE
B/L profound PDism since 4-8 years.  She is unfortunately acutely sensitive to Ldopa - confusions and hallucinations with small doses 25/100 CR.  Failed donepezil  Remains in selegiline 5mg BID    Suggsted try excelon 1.5mg Qdaily, then if tolerates retry carbidopa/levodopa

## 2024-09-25 PROCEDURE — G0180 MD CERTIFICATION HHA PATIENT: HCPCS | Mod: ,,, | Performed by: PSYCHIATRY & NEUROLOGY

## 2024-10-01 ENCOUNTER — TELEPHONE (OUTPATIENT)
Dept: NEUROLOGY | Facility: CLINIC | Age: 84
End: 2024-10-01
Payer: MEDICARE

## 2024-10-01 NOTE — TELEPHONE ENCOUNTER
Called patient today about scheduling a virtual visit. Patient  picks up the phone, Patient has been scheduled for December 16 at 3:20pm.

## 2024-10-12 DIAGNOSIS — E11.3393 CONTROLLED TYPE 2 DIABETES MELLITUS WITH BOTH EYES AFFECTED BY MODERATE NONPROLIFERATIVE RETINOPATHY WITHOUT MACULAR EDEMA, WITH LONG-TERM CURRENT USE OF INSULIN: ICD-10-CM

## 2024-10-12 DIAGNOSIS — Z79.4 CONTROLLED TYPE 2 DIABETES MELLITUS WITH BOTH EYES AFFECTED BY MODERATE NONPROLIFERATIVE RETINOPATHY WITHOUT MACULAR EDEMA, WITH LONG-TERM CURRENT USE OF INSULIN: ICD-10-CM

## 2024-10-12 NOTE — TELEPHONE ENCOUNTER
No care due was identified.  [facility] Embedded Care Due Messages. Reference number: [id number].   10/12/2024 1:35:22 PM CDT

## 2024-10-13 RX ORDER — INSULIN DETEMIR 100 [IU]/ML
20 INJECTION, SOLUTION SUBCUTANEOUS NIGHTLY
Qty: 18 ML | Refills: 0 | Status: SHIPPED | OUTPATIENT
Start: 2024-10-13

## 2024-10-13 NOTE — TELEPHONE ENCOUNTER
Refill Decision Note   Radha Alas  is requesting a refill authorization.  Brief Assessment and Rationale for Refill:  Approve     Medication Therapy Plan:         Comments:     Note composed:11:58 AM 10/13/2024

## 2024-10-15 ENCOUNTER — DOCUMENT SCAN (OUTPATIENT)
Dept: HOME HEALTH SERVICES | Facility: HOSPITAL | Age: 84
End: 2024-10-15
Payer: MEDICARE

## 2024-10-24 ENCOUNTER — EXTERNAL HOME HEALTH (OUTPATIENT)
Dept: HOME HEALTH SERVICES | Facility: HOSPITAL | Age: 84
End: 2024-10-24
Payer: MEDICARE

## 2024-10-25 ENCOUNTER — PATIENT MESSAGE (OUTPATIENT)
Dept: FAMILY MEDICINE | Facility: CLINIC | Age: 84
End: 2024-10-25
Payer: MEDICARE

## 2024-10-25 DIAGNOSIS — Z79.4 CONTROLLED TYPE 2 DIABETES MELLITUS WITH BOTH EYES AFFECTED BY MODERATE NONPROLIFERATIVE RETINOPATHY WITHOUT MACULAR EDEMA, WITH LONG-TERM CURRENT USE OF INSULIN: Primary | ICD-10-CM

## 2024-10-25 DIAGNOSIS — E11.3393 CONTROLLED TYPE 2 DIABETES MELLITUS WITH BOTH EYES AFFECTED BY MODERATE NONPROLIFERATIVE RETINOPATHY WITHOUT MACULAR EDEMA, WITH LONG-TERM CURRENT USE OF INSULIN: Primary | ICD-10-CM

## 2024-10-28 RX ORDER — INSULIN GLARGINE 300 [IU]/ML
20 INJECTION, SOLUTION SUBCUTANEOUS DAILY
Qty: 6 ML | Refills: 4 | Status: SHIPPED | OUTPATIENT
Start: 2024-10-28 | End: 2025-10-28

## 2024-10-28 RX ORDER — INSULIN GLARGINE 300 [IU]/ML
20 INJECTION, SOLUTION SUBCUTANEOUS DAILY
Qty: 6 ML | Refills: 3 | Status: SHIPPED | OUTPATIENT
Start: 2024-10-28 | End: 2024-10-28

## 2024-11-24 PROCEDURE — G0179 MD RECERTIFICATION HHA PT: HCPCS | Mod: ,,, | Performed by: PSYCHIATRY & NEUROLOGY

## 2024-12-02 ENCOUNTER — EXTERNAL HOME HEALTH (OUTPATIENT)
Dept: HOME HEALTH SERVICES | Facility: HOSPITAL | Age: 84
End: 2024-12-02
Payer: MEDICARE

## 2025-01-02 DIAGNOSIS — E11.59 HYPERTENSION ASSOCIATED WITH DIABETES: ICD-10-CM

## 2025-01-02 DIAGNOSIS — E11.3393 CONTROLLED TYPE 2 DIABETES MELLITUS WITH BOTH EYES AFFECTED BY MODERATE NONPROLIFERATIVE RETINOPATHY WITHOUT MACULAR EDEMA, WITH LONG-TERM CURRENT USE OF INSULIN: ICD-10-CM

## 2025-01-02 DIAGNOSIS — Z79.4 CONTROLLED TYPE 2 DIABETES MELLITUS WITH BOTH EYES AFFECTED BY MODERATE NONPROLIFERATIVE RETINOPATHY WITHOUT MACULAR EDEMA, WITH LONG-TERM CURRENT USE OF INSULIN: ICD-10-CM

## 2025-01-02 DIAGNOSIS — I15.2 HYPERTENSION ASSOCIATED WITH DIABETES: ICD-10-CM

## 2025-01-02 NOTE — TELEPHONE ENCOUNTER
Care Due:                  Date            Visit Type   Department     Provider  --------------------------------------------------------------------------------                                ESTABLISHED                              PATIENT -    [facility]    Prabhu De Santiago  Last Visit: 12-      [facility]      [facility]  Next Visit: None Scheduled  None         None Found                                                            Last  Test          Frequency    Reason                     Performed    Due Date  --------------------------------------------------------------------------------    Office Visit  15 months..  glimepiride, insulin,      12- 03-                             lisinopriL, pantoprazole.    HBA1C.......  6 months...  glimepiride, insulin.....  07- 01-    [facility] Embedded Care Due Messages. Reference number: [id number].   1/02/2025 12:49:57 AM CST

## 2025-01-03 RX ORDER — LISINOPRIL 20 MG/1
TABLET ORAL
Qty: 90 TABLET | Refills: 0 | Status: SHIPPED | OUTPATIENT
Start: 2025-01-03

## 2025-01-03 RX ORDER — DILTIAZEM HYDROCHLORIDE 120 MG/1
CAPSULE, COATED, EXTENDED RELEASE ORAL
Qty: 90 CAPSULE | Refills: 0 | Status: SHIPPED | OUTPATIENT
Start: 2025-01-03

## 2025-01-03 NOTE — TELEPHONE ENCOUNTER
Provider Staff:  Action required for this patient    Requires appointment   Requires labs      Please see care gap opportunities below in Care Due Message.    Thanks!  [facility]     Appointments      Date Provider   Last Visit   12/20/2023 Prabhu Clark MD   Next Visit   Visit date not found Prabhu Clark MD     Refill Decision Note   Radha Alas  is requesting a refill authorization.  Brief Assessment and Rationale for Refill:  Approve     Medication Therapy Plan:         Comments:     Note composed:11:01 AM 01/03/2025

## 2025-01-29 ENCOUNTER — LAB VISIT (OUTPATIENT)
Dept: LAB | Facility: HOSPITAL | Age: 85
End: 2025-01-29
Payer: MEDICARE

## 2025-01-29 ENCOUNTER — OFFICE VISIT (OUTPATIENT)
Dept: FAMILY MEDICINE | Facility: CLINIC | Age: 85
End: 2025-01-29
Payer: MEDICARE

## 2025-01-29 VITALS
HEART RATE: 74 BPM | WEIGHT: 216.25 LBS | HEIGHT: 68 IN | RESPIRATION RATE: 18 BRPM | OXYGEN SATURATION: 98 % | DIASTOLIC BLOOD PRESSURE: 80 MMHG | SYSTOLIC BLOOD PRESSURE: 132 MMHG | BODY MASS INDEX: 32.77 KG/M2

## 2025-01-29 DIAGNOSIS — I15.2 HYPERTENSION ASSOCIATED WITH DIABETES: ICD-10-CM

## 2025-01-29 DIAGNOSIS — I48.20 CHRONIC ATRIAL FIBRILLATION: ICD-10-CM

## 2025-01-29 DIAGNOSIS — Z79.4 CONTROLLED TYPE 2 DIABETES MELLITUS WITH BOTH EYES AFFECTED BY MODERATE NONPROLIFERATIVE RETINOPATHY WITHOUT MACULAR EDEMA, WITH LONG-TERM CURRENT USE OF INSULIN: ICD-10-CM

## 2025-01-29 DIAGNOSIS — Z99.3 WHEELCHAIR DEPENDENCE: ICD-10-CM

## 2025-01-29 DIAGNOSIS — G20.A1 MILD DEMENTIA DUE TO PARKINSON'S DISEASE, WITH MOOD DISTURBANCE: ICD-10-CM

## 2025-01-29 DIAGNOSIS — E11.3393 CONTROLLED TYPE 2 DIABETES MELLITUS WITH BOTH EYES AFFECTED BY MODERATE NONPROLIFERATIVE RETINOPATHY WITHOUT MACULAR EDEMA, WITH LONG-TERM CURRENT USE OF INSULIN: ICD-10-CM

## 2025-01-29 DIAGNOSIS — E78.5 HYPERLIPIDEMIA ASSOCIATED WITH TYPE 2 DIABETES MELLITUS: ICD-10-CM

## 2025-01-29 DIAGNOSIS — E11.69 HYPERLIPIDEMIA ASSOCIATED WITH TYPE 2 DIABETES MELLITUS: ICD-10-CM

## 2025-01-29 DIAGNOSIS — F02.A3 MILD DEMENTIA DUE TO PARKINSON'S DISEASE, WITH MOOD DISTURBANCE: ICD-10-CM

## 2025-01-29 DIAGNOSIS — Z00.00 ROUTINE GENERAL MEDICAL EXAMINATION AT HEALTH CARE FACILITY: Primary | ICD-10-CM

## 2025-01-29 DIAGNOSIS — R29.3 POSTURE ABNORMALITY: ICD-10-CM

## 2025-01-29 DIAGNOSIS — E66.811 OBESITY (BMI 30.0-34.9): ICD-10-CM

## 2025-01-29 DIAGNOSIS — E11.59 HYPERTENSION ASSOCIATED WITH DIABETES: ICD-10-CM

## 2025-01-29 PROBLEM — E66.01 SEVERE OBESITY (BMI 35.0-39.9) WITH COMORBIDITY: Status: RESOLVED | Noted: 2023-06-20 | Resolved: 2025-01-29

## 2025-01-29 LAB
ALBUMIN SERPL BCP-MCNC: 3.5 G/DL (ref 3.5–5.2)
ALP SERPL-CCNC: 72 U/L (ref 40–150)
ALT SERPL W/O P-5'-P-CCNC: 20 U/L (ref 10–44)
ANION GAP SERPL CALC-SCNC: 8 MMOL/L (ref 8–16)
AST SERPL-CCNC: 23 U/L (ref 10–40)
BILIRUB SERPL-MCNC: 0.5 MG/DL (ref 0.1–1)
BUN SERPL-MCNC: 13 MG/DL (ref 8–23)
CALCIUM SERPL-MCNC: 9.6 MG/DL (ref 8.7–10.5)
CHLORIDE SERPL-SCNC: 102 MMOL/L (ref 95–110)
CO2 SERPL-SCNC: 27 MMOL/L (ref 23–29)
CREAT SERPL-MCNC: 0.8 MG/DL (ref 0.5–1.4)
EST. GFR  (NO RACE VARIABLE): >60 ML/MIN/1.73 M^2
ESTIMATED AVG GLUCOSE: 134 MG/DL (ref 68–131)
GLUCOSE SERPL-MCNC: 173 MG/DL (ref 70–110)
HBA1C MFR BLD: 6.3 % (ref 4–5.6)
POTASSIUM SERPL-SCNC: 4.2 MMOL/L (ref 3.5–5.1)
PROT SERPL-MCNC: 8 G/DL (ref 6–8.4)
SODIUM SERPL-SCNC: 137 MMOL/L (ref 136–145)

## 2025-01-29 PROCEDURE — 99214 OFFICE O/P EST MOD 30 MIN: CPT | Mod: S$GLB,,,

## 2025-01-29 PROCEDURE — 80053 COMPREHEN METABOLIC PANEL: CPT

## 2025-01-29 PROCEDURE — 3079F DIAST BP 80-89 MM HG: CPT | Mod: CPTII,S$GLB,,

## 2025-01-29 PROCEDURE — 85025 COMPLETE CBC W/AUTO DIFF WBC: CPT

## 2025-01-29 PROCEDURE — 1101F PT FALLS ASSESS-DOCD LE1/YR: CPT | Mod: CPTII,S$GLB,,

## 2025-01-29 PROCEDURE — 1160F RVW MEDS BY RX/DR IN RCRD: CPT | Mod: CPTII,S$GLB,,

## 2025-01-29 PROCEDURE — 83036 HEMOGLOBIN GLYCOSYLATED A1C: CPT

## 2025-01-29 PROCEDURE — 36415 COLL VENOUS BLD VENIPUNCTURE: CPT | Mod: PO

## 2025-01-29 PROCEDURE — G2211 COMPLEX E/M VISIT ADD ON: HCPCS | Mod: S$GLB,,,

## 2025-01-29 PROCEDURE — 99999 PR PBB SHADOW E&M-EST. PATIENT-LVL V: CPT | Mod: PBBFAC,,,

## 2025-01-29 PROCEDURE — 3288F FALL RISK ASSESSMENT DOCD: CPT | Mod: CPTII,S$GLB,,

## 2025-01-29 PROCEDURE — 1126F AMNT PAIN NOTED NONE PRSNT: CPT | Mod: CPTII,S$GLB,,

## 2025-01-29 PROCEDURE — 3075F SYST BP GE 130 - 139MM HG: CPT | Mod: CPTII,S$GLB,,

## 2025-01-29 PROCEDURE — 1159F MED LIST DOCD IN RCRD: CPT | Mod: CPTII,S$GLB,,

## 2025-01-29 NOTE — PROGRESS NOTES
Subjective:       Patient ID: Radha Alas is a 84 y.o. female.    Chief Complaint: Follow-up (6 month f/u)    Follow-up        History of Present Illness    CHIEF COMPLAINT:  Patient presents today for routine six-month follow-up of chronic medical conditions.    FUNCTIONAL STATUS:  She reports difficulty sitting up, particularly pronounced this week, requiring frequent position adjustments due to slipping. These sitting difficulties are not experienced at home in her own chair. She denies receiving assistance with activities of daily living from current home health services, though they monitor her vital signs. Her caregiver indicates need for additional assistance.    MEDICAL HISTORY:  She has Parkinson's disease with mild dementia and mood disturbance. Additional diagnoses include chronic atrial fibrillation, diabetes with associated hypertension, hyperlipidemia, and obesity. She reports difficulty with exercise due to her medical conditions.    CURRENT MEDICATIONS:  She is currently taking Lisinopril 20mg, Cardizem 120mg, L-Dipril 5mg twice daily, Exelon 1.5mg daily, and Amaryl with insulin for diabetes management.      ROS:  Musculoskeletal: positive joint pain, positive muscle weakness          Past Medical History:   Diagnosis Date    Anemia     Atrial fibrillation     Chronic constipation     Deep vein thrombosis of left lower extremity     Diabetes mellitus     TYPE 2    Diverticulosis     Hypertension     Obesity     Parkinson disease     Peptic ulcer of stomach     Prolapse of female bladder, acquired     Rectocele     Uterine cancer 11-1-2011    STAGE 1-A GRADE 2       Review of patient's allergies indicates:   Allergen Reactions    Glucophage  [metformin]      Other reaction(s): Diarrhea  Other reaction(s): Diarrhea    Sulfa (sulfonamide antibiotics) Itching     Other reaction(s): Itching  Other reaction(s): Itching         Current Outpatient Medications:     ACETAMINOPHEN (TYLENOL ARTHRITIS ORAL),  "Take by mouth., Disp: , Rfl:     azelastine (ASTELIN) 137 mcg (0.1 %) nasal spray, 1 spray (137 mcg total) by Nasal route 2 (two) times daily as needed for Rhinitis (sinus drainage, post nasal drip, sinus congestion)., Disp: 30 mL, Rfl: 2    diltiaZEM (CARDIZEM CD) 120 MG Cp24, TAKE 1 CAPSULE EVERY EVENING, Disp: 90 capsule, Rfl: 0    DROPLET PEN NEEDLE 31 gauge x 3/16" Ndle, USE TO ADMINISTER INSULIN ONE TIME DAILY, Disp: 100 each, Rfl: 3    ELIQUIS 5 mg Tab, TAKE 1 TABLET TWICE DAILY, Disp: 180 tablet, Rfl: 3    glimepiride (AMARYL) 4 MG tablet, TAKE 1 TABLET (4 MG TOTAL) BY MOUTH BEFORE BREAKFAST., Disp: 90 tablet, Rfl: 4    insulin glargine, TOUJEO, (TOUJEO SOLOSTAR U-300 INSULIN) 300 unit/mL (1.5 mL) InPn pen, Inject 20 Units into the skin once daily., Disp: 6 mL, Rfl: 4    lisinopriL (PRINIVIL,ZESTRIL) 20 MG tablet, TAKE 1 TABLET EVERY DAY, Disp: 90 tablet, Rfl: 0    pantoprazole (PROTONIX) 40 MG tablet, TAKE 1 TABLET ONE TIME DAILY, Disp: 90 tablet, Rfl: 3    polyethylene glycol (GLYCOLAX) 17 gram/dose powder, Take 17 g by mouth daily as needed., Disp: , Rfl:     rivastigmine tartrate (EXELON) 1.5 MG capsule, Take 1 capsule (1.5 mg total) by mouth once daily., Disp: 30 capsule, Rfl: 11    selegiline (ELDEPRYL) 5 mg Cap, TAKE 1 CAPSULE TWICE DAILY, Disp: 180 capsule, Rfl: 3    TRUE METRIX GLUCOSE TEST STRIP Strp, TEST GLUCOSE LEVELS TWO TO THREE TIMES DAILY FOR DIABETES, Disp: 300 strip, Rfl: 3    miscellaneous medical supply Kit, Use wheelchair support seatbelt as needed when ambulating to appointment., Disp: 1 kit, Rfl: 0    Review of Systems    Objective:      /80 (BP Location: Right arm, Patient Position: Sitting)   Pulse 74   Resp 18   Ht 5' 8" (1.727 m)   Wt 98.1 kg (216 lb 4.3 oz)   SpO2 98%   BMI 32.88 kg/m²   Physical Exam  Vitals reviewed.   Constitutional:       General: She is not in acute distress.     Appearance: She is obese. She is not ill-appearing, toxic-appearing or diaphoretic. "      Comments: Hunched posture     HENT:      Head: Normocephalic.      Right Ear: External ear normal.      Left Ear: External ear normal.      Nose: Nose normal. No congestion or rhinorrhea.      Mouth/Throat:      Mouth: Mucous membranes are moist.      Pharynx: Oropharynx is clear.   Eyes:      General: No scleral icterus.        Right eye: No discharge.         Left eye: No discharge.      Extraocular Movements: Extraocular movements intact.      Conjunctiva/sclera: Conjunctivae normal.   Cardiovascular:      Rate and Rhythm: Normal rate and regular rhythm.      Pulses: Normal pulses.      Heart sounds: Normal heart sounds. No murmur heard.     No friction rub. No gallop.   Pulmonary:      Effort: Pulmonary effort is normal. No respiratory distress.      Breath sounds: Normal breath sounds. No wheezing, rhonchi or rales.   Chest:      Chest wall: No tenderness.   Musculoskeletal:         General: No swelling, tenderness or deformity. Normal range of motion.      Cervical back: Normal range of motion.      Right lower leg: No edema.      Left lower leg: No edema.   Skin:     General: Skin is warm and dry.      Capillary Refill: Capillary refill takes less than 2 seconds.      Coloration: Skin is not jaundiced.      Findings: No bruising, erythema, lesion or rash.   Neurological:      Mental Status: She is alert and oriented to person, place, and time.      Gait: Gait abnormal.   Psychiatric:         Behavior: Behavior normal.             Assessment:       1. Routine general medical examination at health care facility    2. 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 Hypertension associated with diabetes    4. Chronic atrial fibrillation    5. 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 Obesity (BMI 30.0-34.9)    7. Hyperlipidemia associated with type 2 diabetes  mellitus    8. Wheelchair confinement    9. Posture abnormality          Assessment & Plan    IMPRESSION:  - Assessed hypertension associated with diabetes; BP well-controlled at 132/80  - Evaluated mild dementia due to Parkinson's disease with mood disturbance  - Monitored chronic atrial fibrillation  - Reviewed diabetes management; due for A1C recheck  - Noted obesity with recent BMI decline  - Assessed hyperlipidemia; non-HDL levels at goal, not currently on statin therapy  - Considered patient's mobility issues and comfort, particularly difficulty sitting up    PLAN SUMMARY:  - Continue Exelon 1.5 mg daily for Parkinson's disease  - Order A1C test  - Continue Cardizem 120mg for hypertension management  - Continue L-Dipril 5 mg twice daily  - Continue Amaryl and insulin for diabetes management  - Continue Lisinopril 20 mg  - Order wheelchair support belt through [facility]  - Refer to case management for caregiving resources and insurance coordination  - Refer to Dr. Raza for eye care related to diabetes  - Schedule eye exam for June 2024  - Follow up in 6 months    PARKINSON'S DISEASE:  - Continue Exelon 1.5mg daily for Parkinson's disease.  - Monitor disease progression.  - Continue Exelon 1.5 mg daily.  - Monitor the patient's mild dementia due to Parkinson's disease with mood disturbance.  - Continue L-Dipril 5 mg twice daily.  - Continue L-Dipril 5mg twice daily.  - Monitor disease progression.    MOBILITY ISSUES:  - Monitor the patient's mobility issues and difficulty sitting up.  - Evaluate the patient's posture maintenance in the wheelchair, noting the particularly challenging week.  - Acknowledge the need for physical therapy and maintaining mobility.  - Consider additional support options for the patient's comfort.  - Refer the patient for a wheelchair support belt to improve comfort and posture.  - Order wheelchair support belt through [employer] [facility].  - Monitor the patient's difficulty sitting up and  maintaining posture in the wheelchair.  - Consider additional support options for the patient's comfort in the wheelchair.    HYPERTENSION:  - Continue Lisinopril 20 mg.  - Monitor blood pressure during check-ups, noting the patient's history of hypertension associated with diabetes.  - Note that blood pressure is well-controlled at 132/80 during the current check-up.  - Continue Cardizem 120mg for hypertension management.  - Monitor the condition.  - Continue Cardizem 120 mg.    DIABETES:  - Continue Amaryl.  - Continue insulin.  - Order A1C test.  - Monitor the patient's diabetes condition.  - Note that the patient is due for a diabetes urine lab, but hold off due to difficulty in collecting a urine specimen.  - Refer the patient to Dr. Raza for eye care related to diabetes.  - Schedule an eye exam for June 2024.    CHRONIC ATRIAL FIBRILLATION:  - Continue anticoagulants.  - Monitor the patient's chronic atrial fibrillation condition.    WEIGHT MANAGEMENT:  - Monitor the patient's obesity, noting that the BMI has declined recently.  - Acknowledge that the patient has difficulty exercising due to their current medical condition.    HYPERLIPIDEMIA:  - Monitor the patient's hyperlipidemia, noting that the most recent lipid check in July of last year showed stable levels and non-HDL levels at goal.  - Note that the patient is not currently on statin therapy.  - Continue monitoring lipid levels at least once annually.    CAREGIVING NEEDS:  - Refer to case management for assistance with caregiving resources and insurance coordination.  - Monitor the patient's need for care at home, currently provided   by a family member/caregiver.  - Acknowledge the challenges of caregiving and suggest exploring additional support options.    FOLLOW UP:  - Patient to continue current exercise regimen as tolerated due to medical conditions.  - Follow up in 6 months.  - Contact the office if any issues arise before the next appointment.           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  Dispense: 1 kit; Refill: 0    Posture abnormality  -     miscellaneous medical supply Kit; Use wheelchair support seatbelt as needed when ambulating to appointment.  Dispense: 1 kit; Refill: 0                   Ronen Zhu PA-C  Family Medicine Physician Assistant       Future Appointments       Date Provider Specialty Appt Notes    8/15/2025 Prabhu Clark MD Family Medicine 6 month f/u               I spent a total of 20 minutes on the day of the visit.This includes face to face time and non-face to face time preparing to see the patient (eg, review of tests), obtaining and/or reviewing separately obtained history, documenting clinical information in the electronic or other health record, independently interpreting results and communicating results to the patient/family/caregiver, or care coordinator.      We have addressed [4] Moderate: 1 or more chronic illnesses with exacerbation, progression, or side effects of treatment  / 2 or more stable chronic illnesses / 1 undiagnosed new problem with uncertain prognosis / 1 acute illness with systemic symptoms / 1 acute complicated injury  The complexity of the data reviewed and analyzed for this visit was [3] Limited (Reviewed prior external note, ordered unique testing or reviewed the results of each unique test)   The risk of complications and/or morbidity or mortality are [4] Moderate risk (I.e. prescription drug management / decision regarding minor surgery with identified pt or procedure risk factors / decision regarding elective major surgery without identified pt or procedure risk factors / diagnosis or treatment significantly limited by social determinants of health)   The level of Medical Decision Making for this visit is [4] Moderate     This note may have been generated with the assistance of ambient listening technology. If used, verbal consent was obtained by the patient and accompanying visitor(s) for the recording of patient appointment to facilitate this note. I attest to having reviewed and edited the generated note for accuracy, though some syntax or spelling errors may persist. Please contact the author of this note for any clarification.

## 2025-01-30 ENCOUNTER — PATIENT OUTREACH (OUTPATIENT)
Dept: ADMINISTRATIVE | Facility: OTHER | Age: 85
End: 2025-01-30
Payer: MEDICARE

## 2025-01-30 DIAGNOSIS — G20.B2 PARKINSON'S DISEASE WITH DYSKINESIA AND FLUCTUATING MANIFESTATIONS: Primary | ICD-10-CM

## 2025-01-30 LAB
BASOPHILS # BLD AUTO: 0.04 K/UL (ref 0–0.2)
BASOPHILS NFR BLD: 0.6 % (ref 0–1.9)
DIFFERENTIAL METHOD BLD: ABNORMAL
EOSINOPHIL # BLD AUTO: 0.1 K/UL (ref 0–0.5)
EOSINOPHIL NFR BLD: 1.3 % (ref 0–8)
ERYTHROCYTE [DISTWIDTH] IN BLOOD BY AUTOMATED COUNT: 13.2 % (ref 11.5–14.5)
HCT VFR BLD AUTO: 45.5 % (ref 37–48.5)
HGB BLD-MCNC: 13.8 G/DL (ref 12–16)
IMM GRANULOCYTES # BLD AUTO: 0.02 K/UL (ref 0–0.04)
IMM GRANULOCYTES NFR BLD AUTO: 0.3 % (ref 0–0.5)
LYMPHOCYTES # BLD AUTO: 2.4 K/UL (ref 1–4.8)
LYMPHOCYTES NFR BLD: 38.2 % (ref 18–48)
MCH RBC QN AUTO: 29.4 PG (ref 27–31)
MCHC RBC AUTO-ENTMCNC: 30.3 G/DL (ref 32–36)
MCV RBC AUTO: 97 FL (ref 82–98)
MONOCYTES # BLD AUTO: 0.4 K/UL (ref 0.3–1)
MONOCYTES NFR BLD: 5.7 % (ref 4–15)
NEUTROPHILS # BLD AUTO: 3.4 K/UL (ref 1.8–7.7)
NEUTROPHILS NFR BLD: 53.9 % (ref 38–73)
NRBC BLD-RTO: 0 /100 WBC
PLATELET # BLD AUTO: 318 K/UL (ref 150–450)
PMV BLD AUTO: 10.4 FL (ref 9.2–12.9)
RBC # BLD AUTO: 4.7 M/UL (ref 4–5.4)
WBC # BLD AUTO: 6.33 K/UL (ref 3.9–12.7)

## 2025-01-30 NOTE — PROGRESS NOTES
CHW - Initial Contact    This Community Health Worker completed OR updated the Social Determinant of Health questionnaire with  , Isak  via telephone today.    Pt identified barriers of most importance are: caregiver support       Support and Services:    stated he does not want placement for Vera but more in-home caregiver support with ADL's. She is wheelchair bound, can stand hunched over during sponge bath but Isak voiced he is nervous she will fall. He baths Vera on bedside commode, but would like a shower bench and walker w/o seat to hold to. I provided options of bathing her in bed or using a shower bench for better access.    I advised him I will call [facility] in the morning to request nursing services for bath, ask MD to order DME and look for hired caregiver help resources.  I asked him to call [facility] for in-home support resources.    Other information discussed the patient needs / wants help with: DME   Follow up required:   Follow-up Outreach - Due: 2/7/2025

## 2025-01-31 ENCOUNTER — PATIENT MESSAGE (OUTPATIENT)
Dept: ADMINISTRATIVE | Facility: OTHER | Age: 85
End: 2025-01-31
Payer: MEDICARE

## 2025-01-31 NOTE — PROGRESS NOTES
Called [facility]- they do not provide nursing aids to assist with ADL's, bathing, dressing, etc

## 2025-02-06 ENCOUNTER — PATIENT MESSAGE (OUTPATIENT)
Dept: ADMINISTRATIVE | Facility: OTHER | Age: 85
End: 2025-02-06
Payer: MEDICARE

## 2025-02-12 ENCOUNTER — PATIENT OUTREACH (OUTPATIENT)
Dept: ADMINISTRATIVE | Facility: OTHER | Age: 85
End: 2025-02-12
Payer: MEDICARE

## 2025-02-12 NOTE — PROGRESS NOTES
CHW - Outreach Attempt    Community Health Worker left a voicemail message for 1st attempt to contact patient regarding: follow up  Community Health Worker to attempt to contact   Isak  on:[phone] and [phone]

## 2025-02-13 ENCOUNTER — PATIENT OUTREACH (OUTPATIENT)
Dept: ADMINISTRATIVE | Facility: OTHER | Age: 85
End: 2025-02-13
Payer: MEDICARE

## 2025-02-13 NOTE — PROGRESS NOTES
CHW - Follow Up    This Community Health Worker completed a follow up visit with patient via telephone today.  Pt/Caregiver reported: waiting on DME, will call HH to see if a nurse can be provided for abthing, call [facility] and really like the services they provide.  Community Health Worker provided: will follow up on DME  Follow up required:   Follow-up Outreach - Due: 2/18/2025

## 2025-02-13 NOTE — PROGRESS NOTES
CHW - Outreach Attempt    Community Health Worker left a voicemail message for 2nd attempt to contact patient regarding: follow up  Community Health Worker to attempt to contact  Radha or Isak  on:[phone] and [phone]

## 2025-02-21 ENCOUNTER — PATIENT OUTREACH (OUTPATIENT)
Dept: ADMINISTRATIVE | Facility: OTHER | Age: 85
End: 2025-02-21
Payer: MEDICARE

## 2025-02-21 NOTE — PROGRESS NOTES
CHW - Outreach Attempt    Community Health Worker left a voicemail message for 3rd attempt to contact caregiver regarding: follow up  Community Health Worker to attempt to contact caregiver on:[phone]    CHW - Unable to Contact    Community Health Worker to close episode at this time due to three missed attempts for patient contact.

## 2025-02-24 NOTE — DISCHARGE INSTRUCTIONS
Constipation (Adult)  Constipation means that you have bowel movements that are less frequent than usual. Stools often become very hard and difficult to pass.  Constipation is very common. At some point in life it affects almost everyone. Since everyone's bowel habits are different, what is constipation to one person may not be to another. Your healthcare provider may do tests to diagnose constipation. It depends on what he or she finds when evaluating you.    Symptoms of constipation include:  · Abdominal pain  · Bloating  · Vomiting  · Painful bowel movements  · Itching, swelling, bleeding, or pain around the anus  Causes  Constipation can have many causes. These include:  · Diet low in fiber  · Too much dairy  · Not drinking enough liquids  · Lack of exercise or physical activity. This is especially true for older adults.  · Changes in lifestyle or daily routine, including pregnancy, aging, work, and travel  · Frequent use or misuse of laxatives  · Ignoring the urge to have a bowel movement or delaying it until later  · Medicines, such as certain prescription pain medicines, iron supplements, antacids, certain antidepressants, and calcium supplements  · Diseases like irritable bowel syndrome, bowel obstructions, stroke, diabetes, thyroid disease, Parkinson disease, hemorrhoids, and colon cancer  Complications  Potential complications of constipation can include:  · Hemorrhoids  · Rectal bleeding from hemorrhoids or anal fissures (skin tears)  · Hernias  · Dependency on laxatives  · Chronic constipation  · Fecal impaction  · Bowel obstruction or perforation  Home care  All treatment should be done after talking with your healthcare provider. This is especially true if you have another medical problems, are taking prescription medicines, or are an older adult. Treatment most often involves lifestyle changes. You may also need medicines. Your healthcare provider will tell you which will work best for you. Follow  the advice below to help avoid this problem in the future.  Lifestyle changes  These lifestyle changes can help prevent constipation:  · Diet. Eat a high-fiber diet, with fresh fruit and vegetables, and reduce dairy intake, meats, and processed foods  · Fluids. It's important to get enough fluids each day. Drink plenty of water when you eat more fiber. If you are on diet that limits the amount of fluid you can have, talk about this with your healthcare provider.  · Regular exercise. Check with your healthcare provider first.  Medications  Take any medicines as directed. Some laxatives are safe to use only every now and then. Others can be taken on a regular basis. Talk with your doctor or pharmacist if you have questions.  Prescription pain medicines can cause constipation. If you are taking this kind of medicine, ask your healthcare provider if you should also take a stool softener.  Medicines you may take to treat constipation include:  · Fiber supplements  · Stool softeners  · Laxatives  · Enemas  · Rectal suppositories  Follow-up care  Follow up with your healthcare provider if symptoms don't get better in the next few days. You may need to have more tests or see a specialist.  Call 911  Call 911 if any of these occur:  · Trouble breathing  · Stiff, rigid abdomen that is severely painful to touch  · Confusion  · Fainting or loss of consciousness  · Rapid heart rate  · Chest pain  When to seek medical advice  Call your healthcare provider right away if any of these occur:  · Fever over 100.4°F (38°C)  · Failure to resume normal bowel movements  · Pain in your abdomen or back gets worse  · Nausea or vomiting  · Swelling in your abdomen  · Blood in the stool  · Black, tarry stool  · Involuntary weight loss  · Weakness  Date Last Reviewed: 12/30/2015  © 7189-9797 [employer] [address] [place] [address], [place] [address]. All rights reserved. This information is not intended as a substitute for  professional medical care. Always follow your healthcare professional's instructions.        Ischemic Colitis     Ischemic colitis happens if blood flow to a part of the colon is reduced.   Ischemic colitis happens when blood flow to the colon is reduced or blocked. Bloody diarrhea and severe belly pain are the most common symptoms. Other symptoms include vomiting, fever, and fainting. Diarrhea can lead to severe dehydration. This is the rapid loss of the fluids your body needs to function. Because of the severe pain and the risk for dehydration, ischemic colitis should be treated right away.  Causes of ischemic colitis  The cause of the reduction or blockage of blood flow to the colon is not well understood. In some cases, a sudden drop in blood pressure, or dehydration, leads to an episode. Ischemic colitis is more likely in people with blood clotting problems or heart and blood vessel disease.  Diagnosing ischemic colitis  The presence of severe belly pain and bloody diarrhea is often enough to diagnose ischemic colitis. After these symptoms are treated, a test called a colonoscopy will likely be done. This helps rule out other colon problems. The test uses a thin, flexible scope with a light and camera on the end. The scope is inserted through the rectum into the colon. The scope sends pictures from inside the colon to a video screen. A small sample of tissue (biopsy) from the colon may be taken for further testing in a lab.  Treating ischemic colitis  Episodes are treated in the hospital. You may remain in the hospital for several days or longer:  · An IV line is put into a vein in your hand or arm. You will be given fluids through the IV to treat dehydration. You are also given IV pain medicines if you need them.  · You may be given IV antibiotics (medicines that treat infection).  · To rest the bowel, you will not eat or drink for a few days. In rare cases, when symptoms are very severe, you will be given  nutrition through the IV.  · If you lost a lot of blood during the episode, you may receive a blood transfusion.  · In rare cases, an episode causes severe damage to the colon. In this case, surgery may need to be done to remove the damaged section. Your healthcare provider can tell you more if this is needed.  Follow-up  While you are being treated, your healthcare provider will work to find the cause of your ischemic colitis. After you recover, you may need to make lifestyle changes, such as quitting smoking, or take medicines to decrease your risk of another episode. Call 911 or emergency services or go to the emergency room right away if your symptoms return.  Date Last Reviewed: 7/1/2016  © 9333-9551 [employer]. [address] [place] [address], [place] [address]. All rights reserved. This information is not intended as a substitute for professional medical care. Always follow your healthcare professional's instructions.         posterior

## 2025-03-03 RX ORDER — APIXABAN 5 MG/1
5 TABLET, FILM COATED ORAL 2 TIMES DAILY
Qty: 180 TABLET | Refills: 3 | Status: SHIPPED | OUTPATIENT
Start: 2025-03-03

## 2025-03-14 ENCOUNTER — PATIENT MESSAGE (OUTPATIENT)
Dept: FAMILY MEDICINE | Facility: CLINIC | Age: 85
End: 2025-03-14
Payer: MEDICARE

## 2025-03-14 DIAGNOSIS — E11.40 TYPE 2 DIABETES MELLITUS WITH DIABETIC NEUROPATHY, WITH LONG-TERM CURRENT USE OF INSULIN: ICD-10-CM

## 2025-03-14 DIAGNOSIS — G20.A1 PARKINSON'S DISEASE, UNSPECIFIED WHETHER DYSKINESIA PRESENT, UNSPECIFIED WHETHER MANIFESTATIONS FLUCTUATE: ICD-10-CM

## 2025-03-14 DIAGNOSIS — Z79.4 TYPE 2 DIABETES MELLITUS WITH DIABETIC NEUROPATHY, WITH LONG-TERM CURRENT USE OF INSULIN: ICD-10-CM

## 2025-03-14 DIAGNOSIS — G20.A1 MILD DEMENTIA DUE TO PARKINSON'S DISEASE, WITH MOOD DISTURBANCE: Primary | ICD-10-CM

## 2025-03-14 DIAGNOSIS — F02.A3 MILD DEMENTIA DUE TO PARKINSON'S DISEASE, WITH MOOD DISTURBANCE: Primary | ICD-10-CM

## 2025-03-18 ENCOUNTER — TELEPHONE (OUTPATIENT)
Dept: HOME HEALTH SERVICES | Facility: CLINIC | Age: 85
End: 2025-03-18
Payer: MEDICARE

## 2025-03-18 NOTE — TELEPHONE ENCOUNTER
Contacted pt spouse Isak to schedule an appointment regarding a referral placed for a medical home visit with a nurse practitioner. Patient has been scheduled

## 2025-03-20 ENCOUNTER — CARE AT HOME (OUTPATIENT)
Dept: HOME HEALTH SERVICES | Facility: CLINIC | Age: 85
End: 2025-03-20
Payer: MEDICARE

## 2025-03-20 DIAGNOSIS — G20.A1 MILD DEMENTIA DUE TO PARKINSON'S DISEASE, WITH MOOD DISTURBANCE: ICD-10-CM

## 2025-03-20 DIAGNOSIS — F02.A3 MILD DEMENTIA DUE TO PARKINSON'S DISEASE, WITH MOOD DISTURBANCE: ICD-10-CM

## 2025-03-20 DIAGNOSIS — E11.40 TYPE 2 DIABETES MELLITUS WITH DIABETIC NEUROPATHY, WITH LONG-TERM CURRENT USE OF INSULIN: ICD-10-CM

## 2025-03-20 DIAGNOSIS — Z79.4 TYPE 2 DIABETES MELLITUS WITH DIABETIC NEUROPATHY, WITH LONG-TERM CURRENT USE OF INSULIN: ICD-10-CM

## 2025-03-20 DIAGNOSIS — K11.7 SIALORRHEA: Primary | ICD-10-CM

## 2025-03-20 DIAGNOSIS — G20.A1 PARKINSON'S DISEASE, UNSPECIFIED WHETHER DYSKINESIA PRESENT, UNSPECIFIED WHETHER MANIFESTATIONS FLUCTUATE: ICD-10-CM

## 2025-03-20 DIAGNOSIS — J00 ACUTE NASOPHARYNGITIS: ICD-10-CM

## 2025-03-20 PROCEDURE — 1160F RVW MEDS BY RX/DR IN RCRD: CPT | Mod: CPTII,S$GLB,, | Performed by: NURSE PRACTITIONER

## 2025-03-20 PROCEDURE — 1159F MED LIST DOCD IN RCRD: CPT | Mod: CPTII,S$GLB,, | Performed by: NURSE PRACTITIONER

## 2025-03-20 PROCEDURE — 3074F SYST BP LT 130 MM HG: CPT | Mod: CPTII,S$GLB,, | Performed by: NURSE PRACTITIONER

## 2025-03-20 PROCEDURE — 3288F FALL RISK ASSESSMENT DOCD: CPT | Mod: CPTII,S$GLB,, | Performed by: NURSE PRACTITIONER

## 2025-03-20 PROCEDURE — 3078F DIAST BP <80 MM HG: CPT | Mod: CPTII,S$GLB,, | Performed by: NURSE PRACTITIONER

## 2025-03-20 PROCEDURE — 1126F AMNT PAIN NOTED NONE PRSNT: CPT | Mod: CPTII,S$GLB,, | Performed by: NURSE PRACTITIONER

## 2025-03-20 PROCEDURE — 1101F PT FALLS ASSESS-DOCD LE1/YR: CPT | Mod: CPTII,S$GLB,, | Performed by: NURSE PRACTITIONER

## 2025-03-20 PROCEDURE — 99349 HOME/RES VST EST MOD MDM 40: CPT | Mod: S$GLB,,, | Performed by: NURSE PRACTITIONER

## 2025-03-20 RX ORDER — AMOXICILLIN AND CLAVULANATE POTASSIUM 875; 125 MG/1; MG/1
1 TABLET, FILM COATED ORAL 2 TIMES DAILY
Qty: 14 TABLET | Refills: 0 | Status: SHIPPED | OUTPATIENT
Start: 2025-03-20

## 2025-03-24 VITALS
HEART RATE: 85 BPM | DIASTOLIC BLOOD PRESSURE: 68 MMHG | OXYGEN SATURATION: 97 % | SYSTOLIC BLOOD PRESSURE: 118 MMHG | TEMPERATURE: 98 F | RESPIRATION RATE: 20 BRPM

## 2025-03-24 PROBLEM — J00 ACUTE NASOPHARYNGITIS: Status: ACTIVE | Noted: 2025-03-24

## 2025-03-24 PROBLEM — R68.89 COPIOUS ORAL SECRETIONS: Status: ACTIVE | Noted: 2025-03-24

## 2025-03-24 PROBLEM — K11.7 SIALORRHEA: Status: ACTIVE | Noted: 2025-03-24

## 2025-03-24 NOTE — ASSESSMENT & PLAN NOTE
Chronic, stable.  Patient is talking/interacting less per spouse.  ADL dependent, non-ambulatory.  Failed donepezil per Neurology.

## 2025-03-24 NOTE — PATIENT INSTRUCTIONS
Instructions  - Continue all medications, treatments and therapies as ordered.   - Follow all instructions, recommendations as discussed.  - Maintain Safety Precautions at all times.  - Attend all medical appointments as scheduled.  - For worsening symptoms: call Primary Care Physician or Nurse Practitioner.  - For emergencies, call 911 or immediately report to the nearest emergency room.

## 2025-03-24 NOTE — PROGRESS NOTES
"[facility]  Medical Chronic Care Home Visit    Encounter Provider: Rosa Collins   PCP: Prabhu Clark MD  Consult Requested By: Ronen Zhu    HISTORY OF PRESENT ILLNESS      Patient ID: Radha Alas is a 84 y.o. female is being seen in the home due to physical debility that presents a taxing effort to leave the home, to mitigate high risk of hospital readmission and/or due to the limited availability of reliable or safe options for transportation to the point of access to the provider. Prior to treatment on this visit the chart was reviewed and patient verbal consent was obtained.    Chronic medical conditions synopsis:  Radha is an 84 year old female with parkinson disease (2014), T2DM, a-fib, htn, hld, hx of dvt, chronic constipation, GERD, osteoarthritis and prolapse bladder.     She is being seen at home today per request of her spouse through PCP due to cough. Patient has advanced Parkinson's disease and it is very difficult to leave home for appointments.     With this [facility] visit, patient is found sitting in her recliner chair, multiple family members are present, visiting from out of town. Her spouse and primary caregiver, Isak, is present and provides history. He reports 3-4 days of patient having a deep cough and she is not able to expectorate easily. Due to her parkinsons she is unable to take mucinex or robitussin. No changes in appetite reported, "eats well". No fever, chills, sneezing, N/V/D. No sick contacts. Patient denies sore throat. Her speech is nearly not understandable due to disease process. She also has excessive drooling noted today. She relies on her spouse for all ADLs including transferring, he uses a maegan lift to get her into and out of bed.    Patient with DM, spouse checks BG, ranges over last few weeks 112-130.    Followed by  THANIA Fine-LOV 1/29/25  Dr. Rodriguez-Neurology-LOV 9/23/24  Dr. Vail-Cardiology-LOV 9/4/24    No distress noted. " [facility] contact information provided to patient and left in home. Will follow patient in home every 8-12 weeks and/or as needed due to taxing effort to leave home.    DECISION MAKING TODAY       Assessment & Plan:  1. Sialorrhea  Assessment & Plan:  Chronic as a result of advancing Parkinson's disease.  She is unable to clear her oral secretions effectively and concerns for aspiration are present.    Orders:  -     SUCTION MACHINE FOR HOME USE    2. 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  Patient is talking/interacting less per spouse.  ADL dependent, non-ambulatory.  Failed donepezil per Neurology.      Orders:  -     Ambulatory referral/consult to [facility] [place] [facility]    3. Parkinson's disease, unspecified whether dyskinesia present, unspecified whether manifestations fluctuate  Assessment & Plan:  Chronic, worse over last several months.  ADL dependent, non-ambulatory, interacting less.  Followed by Neurology.  On rivastigmine and selegiline.  She is unfortunately acutely sensitive to Ldopa - confusions and hallucinations with small doses 25/100 CR.           Orders:  -     Ambulatory referral/consult to [facility]  -     SUCTION MACHINE FOR HOME USE    4. Type 2 diabetes mellitus with diabetic neuropathy, with long-term current use of insulin  Assessment & Plan:  Chronic, stable on glimepiride, toujeo.  Last AIC 6.3 1/2025  Spouse checks BG daily.  Followed by PCP.    Orders:  -     Ambulatory referral/consult to [facility] [place] [facility]    5. Acute nasopharyngitis  Assessment & Plan:  Acute, dark, yellow copious nasal secretions, deep cough and unable to expectorate due to weakness from advancing Parkinson's. Currently has a house full of family visiting from out of town, usually very isolated.  No fever or other symptoms.  Spouse very concerned about sinus infection and/or  pneumonia.  Lungs are diminished but clear at this time.  Patient has copious oral secretions as well.  Supportive care, augmentin as prescribed.  Spouse educated on patient not being able to take mucinex or robitussin due to Parkinson's.    Orders:  -     amoxicillin-clavulanate 875-125mg (AUGMENTIN) 875-125 mg per tablet; Take 1 tablet by mouth 2 (two) times daily.  Dispense: 14 tablet; Refill: 0           ENVIRONMENT OF CARE      Family and/or Caregiver present at visit?  Yes  Name of Caregiver: spouse, Isak and multiple children and grandchildren visiting from out of town  History provided by: caregiver    4Ms for Medical Decision-Making in Older Adults    Last Completed EAWV:  None    MEDICATIONS:  High Risk Medications:  Total Active Medications: 0  This patient does not have an active medication from one of the medication groupers.    MOBILITY:  Activities of Daily Living:       No data to display              Fall Risk:      3/20/2025     8:00 AM 1/29/2025     1:30 PM 9/4/2024     1:45 PM   Fall Risk Assessment - Outpatient   Mobility Status Wheelchair Bound Ambulatory Wheelchair Bound   Number of falls 0 0 0   Identified as fall risk True False True     Disability Status:      9/28/2016    10:07 AM   Disability Status   Are you deaf or do you have serious difficulty hearing? N   Are you blind or do you have serious difficulty seeing, even when wearing glasses? N   Because of a physical, mental, or emotional condition, do you have serious difficulty concentrating, remembering, or making decisions? N   Do you have serious difficulty walking or climbing stairs? N   Do you have difficulty dressing or bathing? Y   Because of a physical, mental, or emotional condition, do you have difficulty doing errands alone such as visiting a doctor's office or shopping? Y     Nutrition Screening:       No data to display             Screening Score: 0-7 Malnourished, 8-11 At Risk, 12-14 Normal  Get Up and Go:       No data  to display              Whisper Test:       No data to display                    MENTATION:   Has Dementia Dx: Yes  Has Anxiety Dx: Yes    Depression Patient Health Questionnaire:      1/29/2025     1:17 PM   Depression Patient Health Questionnaire   Over the last two weeks how often have you been bothered by little interest or pleasure in doing things Not at all   Over the last two weeks how often have you been bothered by feeling down, depressed or hopeless Not at all   PHQ-2 Total Score 0     Cognitive Function Screening:       No data to display              Cognitive Function Screening Total - Less than 4 = Abnormal,  Greater than or equal to 4 = Normal        WHAT MATTERS MOST:  Advance Care Planning   ACP Status:   Patient has had an ACP conversation  Living Will: No  Power of : No  [place]: No    What is most important right now is to focus on spending time at home    Accordingly, we have decided that the best plan to meet the patient's goals includes continuing with treatment      What matters most to patient today is: pt unable to state           Is patient hospice appropriate: No  (If needed, use PPS <30 or FAST score >7)  Was referral to hospice placed: No    Impression upon entering the home:  Physical Dwelling: single family home   Appearance of home environment: cleaniness: clean, walking pathways: clear, lighting: adequate, and home structure: sound structure  Functional Status: max assistance  Mobility: chair bound  Nutritional access: adequate intake and access  Home Health: Yes,  Agency [facility]    DME/Supplies: wheelchair and maegan lift, glucometer      Diagnostic tests reviewed/disposition: No diagnosic tests pending after this hospitalization.  Disease/illness education:  URI, parkinson's  Establishment or re-establishment of referral orders for community resources: No other necessary community resources.   Discussion with other health care providers: No discussion with other health  care providers necessary.   Does patient have a PCP at [facility]? Yes   Repatriation plan with PCP? [facility] reason: mobility   Does patient have an ostomy (ileostomy, colostomy, suprapubic catheter, nephrostomy tube, tracheostomy, PEG tube, pleurex catheter, cholecystostomy, etc)? No  Were BPAs reviewed? Yes    Social History     Socioeconomic History    Marital status:      Spouse name: Isak Alas   Occupational History    Occupation: retired teacher   Tobacco Use    Smoking status: Never    Smokeless tobacco: Never   Substance and Sexual Activity    Alcohol use: No     Alcohol/week: 0.0 standard drinks of alcohol    Drug use: Yes    Sexual activity: Not Currently     Social Drivers of Health     Financial Resource Strain: Low Risk  (12/20/2023)    Overall Financial Resource Strain (CARDIA)     Difficulty of Paying Living Expenses: Not very hard   Food Insecurity: No Food Insecurity (1/28/2025)    Hunger Vital Sign     Worried About Running Out of Food in the Last Year: Never true     Ran Out of Food in the Last Year: Never true   Transportation Needs: No Transportation Needs (12/20/2023)    PRAPARE - Transportation     Lack of Transportation (Medical): No     Lack of Transportation (Non-Medical): No   Physical Activity: Inactive (1/28/2025)    Exercise Vital Sign     Days of Exercise per Week: 0 days     Minutes of Exercise per Session: 0 min   Stress: Stress Concern Present (1/28/2025)    [employer] - Occupational Stress Questionnaire     Feeling of Stress : To some extent   Housing Stability: Low Risk  (12/20/2023)    Housing Stability Vital Sign     Unable to Pay for Housing in the Last Year: No     Number of Places Lived in the Last Year: 1     Unstable Housing in the Last Year: No         OBJECTIVE:     Vital Signs:  Vitals:    03/20/25 1300   BP: 118/68   Pulse: 85   Resp: 20   Temp: 98.4 °F (36.9 °C)       Review of Systems   Reason unable to perform ROS: pt with advanced  Parkinson's, speech is severely difficult to understand.       Physical Exam:  Physical Exam  Constitutional:       General: She is not in acute distress.     Appearance: She is obese. She is ill-appearing.   HENT:      Head: Normocephalic and atraumatic.      Right Ear: External ear normal.      Left Ear: External ear normal.      Nose: Rhinorrhea (copious, dark yellow mucus) present. No congestion.      Mouth/Throat:      Mouth: Mucous membranes are moist.      Pharynx: Oropharynx is clear.      Comments: Excessive drooling  Eyes:      Extraocular Movements: Extraocular movements intact.      Conjunctiva/sclera: Conjunctivae normal.      Pupils: Pupils are equal, round, and reactive to light.   Cardiovascular:      Rate and Rhythm: Normal rate and regular rhythm.      Pulses: Normal pulses.      Heart sounds: Normal heart sounds.   Pulmonary:      Breath sounds: No wheezing, rhonchi or rales.      Comments: Intermittent cough noted, mucus heard in back of throat  Patient not able to expectorate  Lungs diminished in bases but otherwise clear  No distress at rest, mild dyspnea with leaning forward with support for lung auscultation    Abdominal:      General: Bowel sounds are normal. There is no distension.      Palpations: Abdomen is soft.   Musculoskeletal:      Cervical back: Neck supple. No tenderness.      Right lower leg: No edema.      Left lower leg: No edema.      Comments: Rigid extremities  Diffuse muscle atrophy in extremities  Mask like facial expressions   Lymphadenopathy:      Cervical: No cervical adenopathy.   Skin:     General: Skin is warm and dry.      Capillary Refill: Capillary refill takes 2 to 3 seconds.   Neurological:      Mental Status: She is alert.      Motor: Weakness present.      Gait: Gait abnormal (non-ambulatory).      Comments: Severe right hand tremor at rest  Unable to understand most of her speech today  Follows simple one-step commands slowly     Psychiatric:         Mood and  "Affect: Mood normal.         Behavior: Behavior normal.       INSTRUCTIONS FOR PATIENT:     Scheduled Follow-up, Appts Reviewed with Modifications if Needed: Yes  Future Appointments   Date Time Provider Department Center   8/15/2025  9:30 AM Prabhu Clark MD [facility] [place]       Current Medications[1]    Medication Reconciliation:  Were medications changed during this appointment? Yes  Were medications in the home? No  Is the patient taking the medications as directed? Yes  Does the patient/caregiver understand the medications and changes? Yes  Does updated med list accurately reflects meds patient is currently taking? Yes    I spent a total of 30 minutes on the day of the visit.This includes face to face time and non-face to face time preparing to see the patient (eg, review of tests), obtaining and/or reviewing separately obtained history, documenting clinical information in the electronic or other health record, independently interpreting results and communicating results to the patient/family/caregiver, or care coordinator.        Signature: Rosa Collins NP         [1]   Current Outpatient Medications:     ACETAMINOPHEN (TYLENOL ARTHRITIS ORAL), Take by mouth., Disp: , Rfl:     azelastine (ASTELIN) 137 mcg (0.1 %) nasal spray, 1 spray (137 mcg total) by Nasal route 2 (two) times daily as needed for Rhinitis (sinus drainage, post nasal drip, sinus congestion)., Disp: 30 mL, Rfl: 2    diltiaZEM (CARDIZEM CD) 120 MG Cp24, TAKE 1 CAPSULE EVERY EVENING, Disp: 90 capsule, Rfl: 0    DROPLET PEN NEEDLE 31 gauge x 3/16" Ndle, USE TO ADMINISTER INSULIN ONE TIME DAILY, Disp: 100 each, Rfl: 3    ELIQUIS 5 mg Tab, TAKE 1 TABLET TWICE DAILY, Disp: 180 tablet, Rfl: 3    glimepiride (AMARYL) 4 MG tablet, TAKE 1 TABLET (4 MG TOTAL) BY MOUTH BEFORE BREAKFAST., Disp: 90 tablet, Rfl: 4    insulin glargine, TOUJEO, (TOUJEO SOLOSTAR U-300 INSULIN) 300 unit/mL (1.5 mL) InPn pen, Inject 20 Units into the skin once " daily., Disp: 6 mL, Rfl: 4    lisinopriL (PRINIVIL,ZESTRIL) 20 MG tablet, TAKE 1 TABLET EVERY DAY, Disp: 90 tablet, Rfl: 0    miscellaneous medical supply Kit, Use wheelchair support seatbelt as needed when ambulating to appointment., Disp: 1 kit, Rfl: 0    pantoprazole (PROTONIX) 40 MG tablet, TAKE 1 TABLET ONE TIME DAILY, Disp: 90 tablet, Rfl: 3    polyethylene glycol (GLYCOLAX) 17 gram/dose powder, Take 17 g by mouth daily as needed., Disp: , Rfl:     rivastigmine tartrate (EXELON) 1.5 MG capsule, Take 1 capsule (1.5 mg total) by mouth once daily., Disp: 30 capsule, Rfl: 11    selegiline (ELDEPRYL) 5 mg Cap, TAKE 1 CAPSULE TWICE DAILY, Disp: 180 capsule, Rfl: 3    TRUE METRIX GLUCOSE TEST STRIP Strp, TEST GLUCOSE LEVELS TWO TO THREE TIMES DAILY FOR DIABETES, Disp: 300 strip, Rfl: 3    amoxicillin-clavulanate 875-125mg (AUGMENTIN) 875-125 mg per tablet, Take 1 tablet by mouth 2 (two) times daily., Disp: 14 tablet, Rfl: 0

## 2025-03-24 NOTE — ASSESSMENT & PLAN NOTE
Chronic, worse over last several months.  ADL dependent, non-ambulatory, interacting less.  Followed by Neurology.  On rivastigmine and selegiline.  She is unfortunately acutely sensitive to Ldopa - confusions and hallucinations with small doses 25/100 CR.

## 2025-03-25 DIAGNOSIS — E11.3393 CONTROLLED TYPE 2 DIABETES MELLITUS WITH BOTH EYES AFFECTED BY MODERATE NONPROLIFERATIVE RETINOPATHY WITHOUT MACULAR EDEMA, WITH LONG-TERM CURRENT USE OF INSULIN: ICD-10-CM

## 2025-03-25 DIAGNOSIS — Z79.4 CONTROLLED TYPE 2 DIABETES MELLITUS WITH BOTH EYES AFFECTED BY MODERATE NONPROLIFERATIVE RETINOPATHY WITHOUT MACULAR EDEMA, WITH LONG-TERM CURRENT USE OF INSULIN: ICD-10-CM

## 2025-03-25 DIAGNOSIS — E11.59 HYPERTENSION ASSOCIATED WITH DIABETES: ICD-10-CM

## 2025-03-25 DIAGNOSIS — I15.2 HYPERTENSION ASSOCIATED WITH DIABETES: ICD-10-CM

## 2025-03-25 RX ORDER — DILTIAZEM HYDROCHLORIDE 120 MG/1
120 CAPSULE, COATED, EXTENDED RELEASE ORAL NIGHTLY
Qty: 90 CAPSULE | Refills: 3 | Status: SHIPPED | OUTPATIENT
Start: 2025-03-25

## 2025-03-25 RX ORDER — LISINOPRIL 20 MG/1
20 TABLET ORAL
Qty: 90 TABLET | Refills: 3 | Status: SHIPPED | OUTPATIENT
Start: 2025-03-25

## 2025-03-25 NOTE — ASSESSMENT & PLAN NOTE
Acute, dark, yellow copious nasal secretions, deep cough and unable to expectorate due to weakness from advancing Parkinson's. Currently has a house full of family visiting from out of town, usually very isolated.  No fever or other symptoms.  Spouse very concerned about sinus infection and/or pneumonia.  Lungs are diminished but clear at this time.  Patient has copious oral secretions as well.  Supportive care, augmentin as prescribed.  Spouse educated on patient not being able to take mucinex or robitussin due to Parkinson's.

## 2025-03-25 NOTE — ASSESSMENT & PLAN NOTE
Chronic, stable on glimepiride, toujeo.  Last AIC 6.3 1/2025  Spouse checks BG daily.  Followed by PCP.

## 2025-03-25 NOTE — ASSESSMENT & PLAN NOTE
Chronic as a result of advancing Parkinson's disease.  She is unable to clear her oral secretions effectively and concerns for aspiration are present.

## 2025-03-25 NOTE — TELEPHONE ENCOUNTER
Care Due:                  Date            Visit Type   Department     Provider  --------------------------------------------------------------------------------                                ESTABLISHED                              PATIENT -    JOSE De Santiago  Last Visit: 12-      VIRTUAL      MEDICINE       [place]                               -                              PRIMARY      [facility] FAMILY Prabhu De Santiago  Next Visit: 08-      CARE ([facility])   MEDICINE       [facility] [place]                                                            Last  Test          Frequency    Reason                     Performed    Due Date  --------------------------------------------------------------------------------    Office Visit  15 months..  glimepiride, insulin,      12- 03-                             lisinopriL, pantoprazole.    [facility] Embedded Care Due Messages. Reference number: [id number].   3/25/2025 11:17:52 AM CDT

## 2025-04-07 RX ORDER — PEN NEEDLE, DIABETIC 31 GX3/16"
NEEDLE, DISPOSABLE MISCELLANEOUS
Qty: 100 EACH | Refills: 3 | Status: SHIPPED | OUTPATIENT
Start: 2025-04-07

## 2025-04-07 NOTE — TELEPHONE ENCOUNTER
No care due was identified.  [facility] Embedded Care Due Messages. Reference number: [id number].   4/07/2025 2:37:00 PM CDT

## 2025-04-11 ENCOUNTER — DOCUMENT SCAN (OUTPATIENT)
Dept: HOME HEALTH SERVICES | Facility: HOSPITAL | Age: 85
End: 2025-04-11
Payer: MEDICARE

## 2025-04-14 NOTE — TELEPHONE ENCOUNTER
No care due was identified.  [facility] Embedded Care Due Messages. Reference number: [id number].   4/14/2025 5:16:42 PM CDT

## 2025-04-15 RX ORDER — GLIMEPIRIDE 4 MG/1
4 TABLET ORAL
Qty: 90 TABLET | Refills: 3 | Status: SHIPPED | OUTPATIENT
Start: 2025-04-15

## 2025-04-15 RX ORDER — CALCIUM CITRATE/VITAMIN D3 200MG-6.25
TABLET ORAL
Qty: 300 STRIP | Refills: 3 | Status: SHIPPED | OUTPATIENT
Start: 2025-04-15

## 2025-04-15 RX ORDER — PANTOPRAZOLE SODIUM 40 MG/1
40 TABLET, DELAYED RELEASE ORAL
Qty: 90 TABLET | Refills: 3 | Status: SHIPPED | OUTPATIENT
Start: 2025-04-15

## 2025-05-01 ENCOUNTER — EXTERNAL HOME HEALTH (OUTPATIENT)
Dept: HOME HEALTH SERVICES | Facility: HOSPITAL | Age: 85
End: 2025-05-01
Payer: MEDICARE

## 2025-05-16 ENCOUNTER — PATIENT MESSAGE (OUTPATIENT)
Facility: CLINIC | Age: 85
End: 2025-05-16
Payer: MEDICARE

## 2025-07-22 ENCOUNTER — CARE AT HOME (OUTPATIENT)
Dept: HOME HEALTH SERVICES | Facility: CLINIC | Age: 85
End: 2025-07-22
Payer: MEDICARE

## 2025-07-22 VITALS
OXYGEN SATURATION: 97 % | TEMPERATURE: 98 F | DIASTOLIC BLOOD PRESSURE: 90 MMHG | HEART RATE: 79 BPM | SYSTOLIC BLOOD PRESSURE: 150 MMHG | RESPIRATION RATE: 16 BRPM

## 2025-07-22 DIAGNOSIS — Z79.4 TYPE 2 DIABETES MELLITUS WITH DIABETIC NEUROPATHY, WITH LONG-TERM CURRENT USE OF INSULIN: ICD-10-CM

## 2025-07-22 DIAGNOSIS — F02.A3 MILD DEMENTIA DUE TO PARKINSON'S DISEASE, WITH MOOD DISTURBANCE: ICD-10-CM

## 2025-07-22 DIAGNOSIS — K11.7 SIALORRHEA: ICD-10-CM

## 2025-07-22 DIAGNOSIS — G20.B2 PARKINSON'S DISEASE WITH DYSKINESIA AND FLUCTUATING MANIFESTATIONS: Primary | ICD-10-CM

## 2025-07-22 DIAGNOSIS — E11.40 TYPE 2 DIABETES MELLITUS WITH DIABETIC NEUROPATHY, WITH LONG-TERM CURRENT USE OF INSULIN: ICD-10-CM

## 2025-07-22 DIAGNOSIS — Z74.09 IMPAIRED MOBILITY AND ACTIVITIES OF DAILY LIVING: ICD-10-CM

## 2025-07-22 DIAGNOSIS — G20.A1 MILD DEMENTIA DUE TO PARKINSON'S DISEASE, WITH MOOD DISTURBANCE: ICD-10-CM

## 2025-07-22 DIAGNOSIS — Z78.9 IMPAIRED MOBILITY AND ACTIVITIES OF DAILY LIVING: ICD-10-CM

## 2025-07-22 PROCEDURE — 1126F AMNT PAIN NOTED NONE PRSNT: CPT | Mod: CPTII,S$GLB,, | Performed by: NURSE PRACTITIONER

## 2025-07-22 PROCEDURE — 1124F ACP DISCUSS-NO DSCNMKR DOCD: CPT | Mod: CPTII,S$GLB,, | Performed by: NURSE PRACTITIONER

## 2025-07-22 PROCEDURE — 1160F RVW MEDS BY RX/DR IN RCRD: CPT | Mod: CPTII,S$GLB,, | Performed by: NURSE PRACTITIONER

## 2025-07-22 PROCEDURE — 3288F FALL RISK ASSESSMENT DOCD: CPT | Mod: CPTII,S$GLB,, | Performed by: NURSE PRACTITIONER

## 2025-07-22 PROCEDURE — 3080F DIAST BP >= 90 MM HG: CPT | Mod: CPTII,S$GLB,, | Performed by: NURSE PRACTITIONER

## 2025-07-22 PROCEDURE — 1159F MED LIST DOCD IN RCRD: CPT | Mod: CPTII,S$GLB,, | Performed by: NURSE PRACTITIONER

## 2025-07-22 PROCEDURE — 1101F PT FALLS ASSESS-DOCD LE1/YR: CPT | Mod: CPTII,S$GLB,, | Performed by: NURSE PRACTITIONER

## 2025-07-22 PROCEDURE — 99350 HOME/RES VST EST HIGH MDM 60: CPT | Mod: S$GLB,,, | Performed by: NURSE PRACTITIONER

## 2025-07-22 PROCEDURE — 3077F SYST BP >= 140 MM HG: CPT | Mod: CPTII,S$GLB,, | Performed by: NURSE PRACTITIONER

## 2025-07-22 NOTE — PROGRESS NOTES
"[facility]  Medical Chronic Care Home Visit    Encounter Provider: Rosa Collins   PCP: Prabhu Clark MD  Consult Requested By: No ref. provider found    HISTORY OF PRESENT ILLNESS      Patient ID: Radha Alas is a 84 y.o. female is being seen in the home due to physical debility that presents a taxing effort to leave the home, to mitigate high risk of hospital readmission and/or due to the limited availability of reliable or safe options for transportation to the point of access to the provider. Prior to treatment on this visit the chart was reviewed and patient verbal consent was obtained.    Chronic medical conditions synopsis:  Radha is an 84 year old female with parkinson disease (2014), T2DM, a-fib, htn, hld, hx of dvt, chronic constipation, GERD, osteoarthritis and prolapse bladder.      With this [facility] visit, patient is found sitting in her recliner chair, spouse/primary caregiver, Isak, is present and provides history. He reports patient has been "stable". No changes in appetite reported, "eats well" but must be fed, she has lost the ability to hold utensils. Her speech is not easily understandable due to advancing Parkinson's. She continues with excessive drooling. She relies on her spouse for all ADLs including transferring, he uses a maegan lift to get her into and out of bed, No recent falls. Significant tremors in BUE noted. B/B incontinent.    Patient is not able to stand/bear weight but wants HH PT/OT, spouse feels it won't help but we will try. Patient states "if I have a person on each side of me I can walk". Spouse reports significant weakness with transferring.    Patient with DM, spouse checks BG, ranges over last few weeks . BG managed with glimepiride, Toujeo.     Followed by  THANIA Fine Dr.-Neurology  Dr. Vail-Cardiology     No distress noted. [facility] contact information provided to patient and left in home. Will " follow patient in home every 8-12 weeks and/or as needed due to taxing effort to leave home.      DECISION MAKING TODAY       Assessment & Plan:  1. Parkinson's disease with dyskinesia and fluctuating manifestations  Assessment & Plan:  Chronic, ADL dependent, non-ambulatory, interacting less.  Followed by Neurology.  On rivastigmine and selegiline.  She is unfortunately acutely sensitive to Ldopa - confusions and hallucinations with small doses 25/100 CR.  Significant BUE tremors.    Orders:  -     Ambulatory referral/consult to Home Health; Future; Expected date: 07/23/2025    2. Impaired mobility and activities of daily living  Assessment & Plan:  Chronic and related to Parkinsons.  Wheelchair bound, requires use of maegan lift.   Spouse provides assistance with all ADLs.   PT/OT ordered.  Fall precautions and safety advised.    Orders:  -     Ambulatory referral/consult to Home Health; Future; Expected date: 07/23/2025    3. 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 No behavioral issues.  Patient is talking/interacting less per spouse.  ADL dependent, non-ambulatory.  Failed donepezil per Neurology.      4. Sialorrhea  Assessment & Plan:  Chronic as a result of advancing Parkinson's disease.  Unable to clear her oral secretions effectively and concerns for aspiration are present.  Spouse educated to keep her upright as much as possible.  Monitor.      5. Type 2 diabetes mellitus with diabetic neuropathy, with long-term current use of insulin  Assessment & Plan:  Chronic, stable on glimepiride, toujeo.  Last AIC 6.3 1/2025  Spouse checks BG daily, stable BG.  Followed by PCP.             ENVIRONMENT OF CARE      Family and/or Caregiver present at visit?  Yes  Name of Caregiver: spouse Isak  History provided by: patient and caregiver    4Ms for Medical Decision-Making in Older Adults    Last Completed EAWV:  None    MEDICATIONS:  High Risk  Medications:  Total Active Medications: 0  This patient does not have an active medication from one of the medication groupers.    MOBILITY:  Activities of Daily Living:       No data to display              Fall Risk:      7/22/2025     1:00 PM 3/20/2025     8:00 AM 1/29/2025     1:30 PM   Fall Risk Assessment - Outpatient   Mobility Status Wheelchair Bound Wheelchair Bound Ambulatory   Number of falls 0 0 0   Identified as fall risk True True False     Disability Status:      9/28/2016    10:07 AM   Disability Status   Are you deaf or do you have serious difficulty hearing? N   Are you blind or do you have serious difficulty seeing, even when wearing glasses? N   Because of a physical, mental, or emotional condition, do you have serious difficulty concentrating, remembering, or making decisions? N   Do you have serious difficulty walking or climbing stairs? N   Do you have difficulty dressing or bathing? Y   Because of a physical, mental, or emotional condition, do you have difficulty doing errands alone such as visiting a doctor's office or shopping? Y     Nutrition Screening:       No data to display             Screening Score: 0-7 Malnourished, 8-11 At Risk, 12-14 Normal  Get Up and Go:       No data to display              Whisper Test:       No data to display                    MENTATION:   Has Dementia Dx: Yes  Has Anxiety Dx: Yes    Depression Patient Health Questionnaire:      1/29/2025     1:17 PM   Depression Patient Health Questionnaire   Over the last two weeks how often have you been bothered by little interest or pleasure in doing things Not at all   Over the last two weeks how often have you been bothered by feeling down, depressed or hopeless Not at all   PHQ-2 Total Score 0     Cognitive Function Screening:       No data to display              Cognitive Function Screening Total - Less than 4 = Abnormal,  Greater than or equal to 4 = Normal        WHAT MATTERS MOST:  Advance Care Planning   ACP  Status:   Patient has had an ACP conversation  Living Will: No  Power of : No  POLST: No    Patient did not wish or was not able to name a surrogate decision maker or provide an Advance Care Plan.           Is patient hospice appropriate: No  (If needed, use PPS <30 or FAST score >7)  Was referral to hospice placed: No    Impression upon entering the home:  Physical Dwelling: single family home   Appearance of home environment: cleaniness: clean, walking pathways: clear, lighting: adequate, and home structure: sound structure  Functional Status: max assistance  Mobility: chair bound  Nutritional access: adequate intake and access  Home Health: No, and will be referred today   DME/Supplies: rolling walker, wheelchair, bedside commode, and glucometer     Diagnostic tests reviewed/disposition: No diagnosic tests pending after this hospitalization.  Disease/illness education: Parkinson's, weakness/debility  Establishment or re-establishment of referral orders for community resources: No other necessary community resources.   Discussion with other health care providers: No discussion with other health care providers necessary.   Does patient have a PCP at [facility]? Yes   Repatriation plan with PCP? [facility] reason: mobility   Does patient have an ostomy (ileostomy, colostomy, suprapubic catheter, nephrostomy tube, tracheostomy, PEG tube, pleurex catheter, cholecystostomy, etc)? No  Were BPAs reviewed? Yes    Social History     Socioeconomic History    Marital status:      Spouse name: Isak Alas   Occupational History    Occupation: retired teacher   Tobacco Use    Smoking status: Never    Smokeless tobacco: Never   Substance and Sexual Activity    Alcohol use: No     Alcohol/week: 0.0 standard drinks of alcohol    Drug use: Yes    Sexual activity: Not Currently     Social Drivers of Health     Financial Resource Strain: Low Risk  (12/20/2023)    Overall Financial Resource Strain (CARDIA)     Difficulty of  Paying Living Expenses: Not very hard   Food Insecurity: No Food Insecurity (1/28/2025)    Hunger Vital Sign     Worried About Running Out of Food in the Last Year: Never true     Ran Out of Food in the Last Year: Never true   Transportation Needs: No Transportation Needs (12/20/2023)    PRAPARE - Transportation     Lack of Transportation (Medical): No     Lack of Transportation (Non-Medical): No   Physical Activity: Inactive (1/28/2025)    Exercise Vital Sign     Days of Exercise per Week: 0 days     Minutes of Exercise per Session: 0 min   Stress: Stress Concern Present (1/28/2025)    [employer] - Occupational Stress Questionnaire     Feeling of Stress : To some extent   Housing Stability: Low Risk  (12/20/2023)    Housing Stability Vital Sign     Unable to Pay for Housing in the Last Year: No     Number of Places Lived in the Last Year: 1     Unstable Housing in the Last Year: No         OBJECTIVE:     Vital Signs:  Vitals:    07/22/25 1300   BP: (!) 150/90   Pulse: 79   Resp: 16   Temp: 98.4 °F (36.9 °C)       Review of Systems   Reason unable to perform ROS: pt with advanced Parkinson's, speech is severely difficult to understand.         Physical Exam:  Physical Exam  Constitutional:       General: She is not in acute distress.     Appearance: She is obese. She is ill-appearing.   HENT:      Head: Normocephalic and atraumatic.      Right Ear: External ear normal.      Left Ear: External ear normal.      Nose: Rhinorrhea (copious, dark yellow mucus) present. No congestion.      Mouth/Throat:      Mouth: Mucous membranes are moist.      Pharynx: Oropharynx is clear.      Comments: Excessive drooling  Eyes:      Extraocular Movements: Extraocular movements intact.      Conjunctiva/sclera: Conjunctivae normal.      Pupils: Pupils are equal, round, and reactive to light.   Cardiovascular:      Rate and Rhythm: Normal rate and regular rhythm.      Pulses: Normal pulses.      Heart  sounds: Normal heart sounds.   Pulmonary:      Breath sounds: No wheezing, rhonchi or rales.      Comments: Intermittent cough noted, mucus heard in back of throat  Patient not able to expectorate  Lungs diminished in bases but otherwise clear  No distress at rest, mild dyspnea with leaning forward with support for lung auscultation     Abdominal:      General: Bowel sounds are normal. There is no distension.      Palpations: Abdomen is soft.   Musculoskeletal:      Cervical back: Neck supple. No tenderness.      Right lower leg: No edema.      Left lower leg: No edema.      Comments: Rigid extremities  Diffuse muscle atrophy in extremities  Mask like facial expressions   Lymphadenopathy:      Cervical: No cervical adenopathy.   Skin:     General: Skin is warm and dry.      Capillary Refill: Capillary refill takes 2 to 3 seconds.   Neurological:      Mental Status: She is alert.      Motor: Weakness present.      Gait: Gait abnormal (non-ambulatory).      Comments: Severe right hand tremor at rest  Unable to understand most of her speech today  Follows simple one-step commands slowly     Psychiatric:         Mood and Affect: Mood normal.         Behavior: Behavior normal.         INSTRUCTIONS FOR PATIENT:     Scheduled Follow-up, Appts Reviewed with Modifications if Needed: Yes  Future Appointments   Date Time Provider Department Center   8/15/2025  9:40 AM Prabhu Clark MD [facility] [place]       Current Medications[1]    Medication Reconciliation:  Were medications changed during this appointment? No  Were medications in the home? Yes  Is the patient taking the medications as directed? Yes  Does the patient/caregiver understand the medications and changes? Yes  Does updated med list accurately reflects meds patient is currently taking? Yes    I spent a total of 45 minutes on the day of the visit.This includes face to face time and non-face to face time preparing to see the patient (eg, review of tests),  "obtaining and/or reviewing separately obtained history, documenting clinical information in the electronic or other health record, independently interpreting results and communicating results to the patient/family/caregiver, or care coordinator.        Signature: Rosa Collins NP         [1]   Current Outpatient Medications:     ACETAMINOPHEN (TYLENOL ARTHRITIS ORAL), Take by mouth., Disp: , Rfl:     diltiaZEM (CARDIZEM CD) 120 MG Cp24, TAKE 1 CAPSULE EVERY EVENING, Disp: 90 capsule, Rfl: 3    DROPLET PEN NEEDLE 31 gauge x 3/16" Ndle, USE TO ADMINISTER INSULIN ONE TIME DAILY, Disp: 100 each, Rfl: 3    ELIQUIS 5 mg Tab, TAKE 1 TABLET TWICE DAILY, Disp: 180 tablet, Rfl: 3    glimepiride (AMARYL) 4 MG tablet, TAKE 1 TABLET BEFORE BREAKFAST, Disp: 90 tablet, Rfl: 3    insulin glargine, TOUJEO, (TOUJEO SOLOSTAR U-300 INSULIN) 300 unit/mL (1.5 mL) InPn pen, Inject 20 Units into the skin once daily., Disp: 6 mL, Rfl: 4    lisinopriL (PRINIVIL,ZESTRIL) 20 MG tablet, TAKE 1 TABLET EVERY DAY, Disp: 90 tablet, Rfl: 3    miscellaneous medical supply Kit, Use wheelchair support seatbelt as needed when ambulating to appointment., Disp: 1 kit, Rfl: 0    pantoprazole (PROTONIX) 40 MG tablet, TAKE 1 TABLET EVERY DAY, Disp: 90 tablet, Rfl: 3    polyethylene glycol (GLYCOLAX) 17 gram/dose powder, Take 17 g by mouth daily as needed., Disp: , Rfl:     rivastigmine tartrate (EXELON) 1.5 MG capsule, Take 1 capsule (1.5 mg total) by mouth once daily., Disp: 30 capsule, Rfl: 11    selegiline (ELDEPRYL) 5 mg Cap, TAKE 1 CAPSULE TWICE DAILY, Disp: 180 capsule, Rfl: 3    TRUE METRIX GLUCOSE TEST STRIP Strp, CHECK BLOOD GLUCOSE 2 TO 3 TIMES DAILY FOR DIABETES, Disp: 300 strip, Rfl: 3    amoxicillin-clavulanate 875-125mg (AUGMENTIN) 875-125 mg per tablet, Take 1 tablet by mouth 2 (two) times daily. (Patient not taking: Reported on 7/22/2025), Disp: 14 tablet, Rfl: 0    azelastine (ASTELIN) 137 mcg (0.1 %) nasal spray, 1 spray (137 mcg total) " by Nasal route 2 (two) times daily as needed for Rhinitis (sinus drainage, post nasal drip, sinus congestion)., Disp: 30 mL, Rfl: 2

## 2025-07-23 PROBLEM — H61.22 HEARING LOSS OF LEFT EAR DUE TO CERUMEN IMPACTION: Status: RESOLVED | Noted: 2022-05-12 | Resolved: 2025-07-23

## 2025-07-23 PROBLEM — J00 ACUTE NASOPHARYNGITIS: Status: RESOLVED | Noted: 2025-03-24 | Resolved: 2025-07-23

## 2025-07-23 NOTE — ASSESSMENT & PLAN NOTE
Chronic, ADL dependent, non-ambulatory, interacting less.  Followed by Neurology.  On rivastigmine and selegiline.  She is unfortunately acutely sensitive to Ldopa - confusions and hallucinations with small doses 25/100 CR.  Significant BUE tremors.

## 2025-07-23 NOTE — ASSESSMENT & PLAN NOTE
Chronic as a result of advancing Parkinson's disease.  Unable to clear her oral secretions effectively and concerns for aspiration are present.  Spouse educated to keep her upright as much as possible.  Monitor.

## 2025-07-23 NOTE — ASSESSMENT & PLAN NOTE
Chronic, stable on glimepiride, toujeo.  Last AIC 6.3 1/2025  Spouse checks BG daily, stable BG.  Followed by PCP.

## 2025-07-23 NOTE — ASSESSMENT & PLAN NOTE
Chronic and related to Parkinsons.  Wheelchair bound, requires use of maegan lift.   Spouse provides assistance with all ADLs.  HH PT/OT ordered.  Fall precautions and safety advised.

## 2025-07-23 NOTE — ASSESSMENT & PLAN NOTE
Chronic, stable. No behavioral issues.  Patient is talking/interacting less per spouse.  ADL dependent, non-ambulatory.  Failed donepezil per Neurology.

## 2025-08-13 ENCOUNTER — EXTERNAL HOME HEALTH (OUTPATIENT)
Dept: HOME HEALTH SERVICES | Facility: HOSPITAL | Age: 85
End: 2025-08-13
Payer: MEDICARE